# Patient Record
Sex: FEMALE | Race: WHITE | NOT HISPANIC OR LATINO | Employment: OTHER | URBAN - METROPOLITAN AREA
[De-identification: names, ages, dates, MRNs, and addresses within clinical notes are randomized per-mention and may not be internally consistent; named-entity substitution may affect disease eponyms.]

---

## 2019-07-18 ENCOUNTER — HISTORICAL (OUTPATIENT)
Dept: CARDIOLOGY | Facility: HOSPITAL | Age: 84
End: 2019-07-18

## 2019-08-19 ENCOUNTER — HISTORICAL (OUTPATIENT)
Dept: RADIOLOGY | Facility: HOSPITAL | Age: 84
End: 2019-08-19

## 2019-12-04 ENCOUNTER — HISTORICAL (OUTPATIENT)
Dept: RADIOLOGY | Facility: HOSPITAL | Age: 84
End: 2019-12-04

## 2020-02-11 ENCOUNTER — HISTORICAL (OUTPATIENT)
Dept: CARDIOLOGY | Facility: HOSPITAL | Age: 85
End: 2020-02-11

## 2020-08-20 ENCOUNTER — HISTORICAL (OUTPATIENT)
Dept: RADIOLOGY | Facility: HOSPITAL | Age: 85
End: 2020-08-20

## 2022-01-01 ENCOUNTER — OFFICE VISIT (OUTPATIENT)
Dept: INTERNAL MEDICINE | Facility: CLINIC | Age: 87
End: 2022-01-01
Payer: MEDICARE

## 2022-01-01 VITALS — WEIGHT: 167.81 LBS | BODY MASS INDEX: 26.34 KG/M2 | HEIGHT: 67 IN

## 2022-01-01 DIAGNOSIS — Z87.81 S/P LEFT HIP FRACTURE: ICD-10-CM

## 2022-01-01 DIAGNOSIS — M79.89 LOCALIZED SWELLING OF BOTH LOWER EXTREMITIES: Primary | ICD-10-CM

## 2022-01-01 DIAGNOSIS — R53.1 LACK OF STRENGTH: ICD-10-CM

## 2022-01-01 PROCEDURE — 99213 PR OFFICE/OUTPT VISIT, EST, LEVL III, 20-29 MIN: ICD-10-PCS | Mod: ,,,

## 2022-01-01 PROCEDURE — 99213 OFFICE O/P EST LOW 20 MIN: CPT | Mod: ,,,

## 2022-01-01 RX ORDER — METHOCARBAMOL 750 MG/1
500 TABLET, FILM COATED ORAL DAILY
COMMUNITY
End: 2023-01-01

## 2022-01-01 RX ORDER — FUROSEMIDE 20 MG/1
10 TABLET ORAL DAILY
Qty: 15 TABLET | Refills: 11 | Status: SHIPPED | OUTPATIENT
Start: 2022-01-01 | End: 2023-01-01

## 2022-01-07 ENCOUNTER — HISTORICAL (OUTPATIENT)
Dept: RADIOLOGY | Facility: HOSPITAL | Age: 87
End: 2022-01-07

## 2022-01-07 LAB — BMD RECOMMENDATION EXT: NORMAL

## 2022-06-16 RX ORDER — APIXABAN 5 MG/1
5 TABLET, FILM COATED ORAL 2 TIMES DAILY
Qty: 60 TABLET | Refills: 1 | Status: SHIPPED | OUTPATIENT
Start: 2022-06-16 | End: 2022-08-18

## 2022-06-16 RX ORDER — APIXABAN 5 MG/1
5 TABLET, FILM COATED ORAL 2 TIMES DAILY
COMMUNITY
Start: 2022-05-12 | End: 2022-06-16 | Stop reason: SDUPTHER

## 2022-06-20 RX ORDER — ALENDRONATE SODIUM 70 MG/1
TABLET ORAL
Qty: 4 TABLET | Refills: 11 | Status: SHIPPED | OUTPATIENT
Start: 2022-06-20 | End: 2022-11-21

## 2022-07-10 PROBLEM — M81.0 OSTEOPOROSIS: Status: ACTIVE | Noted: 2022-07-10

## 2022-07-22 ENCOUNTER — PATIENT OUTREACH (OUTPATIENT)
Dept: ADMINISTRATIVE | Facility: HOSPITAL | Age: 87
End: 2022-07-22
Payer: MEDICARE

## 2022-08-08 RX ORDER — CALCIUM CARBONATE 500(1250)
1 TABLET ORAL 2 TIMES DAILY
COMMUNITY

## 2022-08-09 ENCOUNTER — OFFICE VISIT (OUTPATIENT)
Dept: INTERNAL MEDICINE | Facility: CLINIC | Age: 87
End: 2022-08-09
Payer: MEDICARE

## 2022-08-09 VITALS
BODY MASS INDEX: 26.07 KG/M2 | RESPIRATION RATE: 20 BRPM | WEIGHT: 162.19 LBS | DIASTOLIC BLOOD PRESSURE: 70 MMHG | SYSTOLIC BLOOD PRESSURE: 130 MMHG | TEMPERATURE: 98 F | HEIGHT: 66 IN | HEART RATE: 78 BPM

## 2022-08-09 DIAGNOSIS — I82.5Z2 CHRONIC DEEP VEIN THROMBOSIS (DVT) OF DISTAL VEIN OF LEFT LOWER EXTREMITY: ICD-10-CM

## 2022-08-09 DIAGNOSIS — M81.0 OSTEOPOROSIS, UNSPECIFIED OSTEOPOROSIS TYPE, UNSPECIFIED PATHOLOGICAL FRACTURE PRESENCE: ICD-10-CM

## 2022-08-09 DIAGNOSIS — I49.9 CARDIAC ARRHYTHMIA, UNSPECIFIED CARDIAC ARRHYTHMIA TYPE: ICD-10-CM

## 2022-08-09 DIAGNOSIS — I27.82 OTHER CHRONIC PULMONARY EMBOLISM WITHOUT ACUTE COR PULMONALE: ICD-10-CM

## 2022-08-09 DIAGNOSIS — R79.89 LOW VITAMIN D LEVEL: ICD-10-CM

## 2022-08-09 DIAGNOSIS — Z12.39 ENCOUNTER FOR SCREENING FOR MALIGNANT NEOPLASM OF BREAST, UNSPECIFIED SCREENING MODALITY: ICD-10-CM

## 2022-08-09 DIAGNOSIS — Z00.00 WELLNESS EXAMINATION: Primary | ICD-10-CM

## 2022-08-09 PROCEDURE — 99397 PR PREVENTIVE VISIT,EST,65 & OVER: ICD-10-PCS | Mod: GZ,,, | Performed by: INTERNAL MEDICINE

## 2022-08-09 PROCEDURE — 99397 PER PM REEVAL EST PAT 65+ YR: CPT | Mod: GZ,,, | Performed by: INTERNAL MEDICINE

## 2022-08-09 NOTE — PROGRESS NOTES
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      Review of Systems   Review of Systems   Constitutional: Negative.    HENT: Negative.    Eyes: Negative.    Respiratory: Negative.    Cardiovascular: Negative.    Gastrointestinal: Negative.    Endocrine: Negative.    Genitourinary: Negative.    Musculoskeletal: Negative.    Integumentary:  Negative.   Neurological: Negative.    Psychiatric/Behavioral: Negative.         Medical / Social / Family History     Past Medical History:   Diagnosis Date    Cardiac arrhythmia     Low vitamin D level     Osteoporosis     Other pulmonary embolism without acute cor pulmonale        Past Surgical History:   Procedure Laterality Date    BREAST MASS EXCISION      x3  benign    LAPAROSCOPIC REPAIR OF INGUINAL HERNIA Bilateral        Social History  Ms. Joshua  reports that she has never smoked. She has never used smokeless tobacco. She reports that she does not drink alcohol and does not use drugs.    Family History  Ms.'s Joshua  family history includes Clotting disorder in her mother; No Known Problems in her father and sister.    Medications and Allergies     Medications  Outpatient Medications Marked as Taking for the 22 encounter (Office Visit) with King Duvall MD   Medication Sig Dispense Refill     alendronate (FOSAMAX) 70 MG tablet TAKE ONE TABLET BY MOUTH WEEKLY 30 MINUTES BEFORE FIRST FOOD AND DO NOT LIE DOWN FOR AT LEAST 30 MINUTES 4 tablet 11    calcium carbonate (OS-LEE) 500 mg calcium (1,250 mg) tablet Take 1 tablet by mouth 2 (two) times a day.      ELIQUIS 5 mg Tab Take 1 tablet (5 mg total) by mouth 2 (two) times daily. 60 tablet 1       Allergies  Review of patient's allergies indicates:   Allergen Reactions    Ak-mycin     Beta-blockers (beta-adrenergic blocking agts)     Zyrtec [cetirizine]        Physical Examination     Vitals:    08/09/22 1013   BP: 130/70   Pulse: 78   Resp: 20   Temp: 97.6 °F (36.4 °C)     Physical Exam  Constitutional:       Appearance: Normal appearance.   HENT:      Head: Normocephalic and atraumatic.      Right Ear: Tympanic membrane normal.      Left Ear: Tympanic membrane normal.      Nose: Nose normal.      Mouth/Throat:      Mouth: Mucous membranes are moist.   Eyes:      Extraocular Movements: Extraocular movements intact.      Pupils: Pupils are equal, round, and reactive to light.   Cardiovascular:      Rate and Rhythm: Normal rate and regular rhythm.      Pulses: Normal pulses.   Pulmonary:      Effort: Pulmonary effort is normal.      Breath sounds: Normal breath sounds.   Abdominal:      General: Abdomen is flat. Bowel sounds are normal.      Palpations: Abdomen is soft.   Musculoskeletal:         General: Normal range of motion.      Cervical back: Normal range of motion and neck supple.   Skin:     General: Skin is warm and dry.   Neurological:      General: No focal deficit present.      Mental Status: She is alert and oriented to person, place, and time.   Psychiatric:         Mood and Affect: Mood normal.         Behavior: Behavior normal.          Assessment and Plan (including Health Maintenance)      Problem List  Smart Sets  Document Outside HM   :    Plan:   Wellness examination    Cardiac arrhythmia, unspecified cardiac arrhythmia type    Other  chronic pulmonary embolism without acute cor pulmonale    Chronic deep vein thrombosis (DVT) of distal vein of left lower extremity    Low vitamin D level    Osteoporosis, unspecified osteoporosis type, unspecified pathological fracture presence    Encounter for screening for malignant neoplasm of breast, unspecified screening modality     Discussion of lab results all stable  Mammogram ordered  With the progression of her osteoporosis on Fosamax will recommend beginning Prolia and stopping Fosamax  She wants to return in 1 year for a wellness which is scheduled with the bone density.        There are no preventive care reminders to display for this patient.    Problem List Items Addressed This Visit        Cardiac/Vascular    Cardiac arrhythmia       Hematology    Other pulmonary embolism without acute cor pulmonale    Chronic deep vein thrombosis (DVT) of distal vein of lower extremity       Orthopedic    Osteoporosis       Other    Low vitamin D level      Other Visit Diagnoses     Wellness examination    -  Primary    Encounter for screening for malignant neoplasm of breast, unspecified screening modality              Health Maintenance Topics with due status: Not Due       Topic Last Completion Date    Lipid Panel 08/25/2020    DEXA Scan 01/07/2022    Influenza Vaccine Not Due       No future appointments.         Signature:  King Meza MD  [facility]  [address] [place] [address]  ROSENDO KOWALSKI [id number]    Date of encounter: 8/9/22

## 2022-08-18 RX ORDER — APIXABAN 5 MG/1
TABLET, FILM COATED ORAL
Qty: 60 TABLET | Refills: 1 | Status: SHIPPED | OUTPATIENT
Start: 2022-08-18 | End: 2022-09-14

## 2022-08-18 NOTE — TELEPHONE ENCOUNTER
----- Message from Loren Ramon LPN sent at 8/18/2022  9:42 AM CDT -----  Regarding: FW: need refill today    ----- Message -----  From: Lexi Joshua  Sent: 8/18/2022   9:19 AM CDT  To: Eloina CHRIS Staff  Subject: need refill today                                Patient took last pill of eloquis today and needs refilled at [facility] pharmacy in [place]. Her contact number is [phone]

## 2022-08-24 ENCOUNTER — HOSPITAL ENCOUNTER (OUTPATIENT)
Dept: RADIOLOGY | Facility: HOSPITAL | Age: 87
Discharge: HOME | End: 2022-08-24
Attending: INTERNAL MEDICINE
Payer: MEDICARE

## 2022-08-24 DIAGNOSIS — Z12.39 ENCOUNTER FOR SCREENING FOR MALIGNANT NEOPLASM OF BREAST, UNSPECIFIED SCREENING MODALITY: ICD-10-CM

## 2022-08-24 PROCEDURE — 77067 SCR MAMMO BI INCL CAD: CPT | Mod: 26,,, | Performed by: STUDENT IN AN ORGANIZED HEALTH CARE EDUCATION/TRAINING PROGRAM

## 2022-08-24 PROCEDURE — 77063 BREAST TOMOSYNTHESIS BI: CPT | Mod: 26,,, | Performed by: STUDENT IN AN ORGANIZED HEALTH CARE EDUCATION/TRAINING PROGRAM

## 2022-08-24 PROCEDURE — 77067 MAMMO DIGITAL SCREENING BILAT WITH TOMO: ICD-10-PCS | Mod: 26,,, | Performed by: STUDENT IN AN ORGANIZED HEALTH CARE EDUCATION/TRAINING PROGRAM

## 2022-08-24 PROCEDURE — 77063 MAMMO DIGITAL SCREENING BILAT WITH TOMO: ICD-10-PCS | Mod: 26,,, | Performed by: STUDENT IN AN ORGANIZED HEALTH CARE EDUCATION/TRAINING PROGRAM

## 2022-08-24 PROCEDURE — 77067 SCR MAMMO BI INCL CAD: CPT | Mod: TC

## 2022-08-25 ENCOUNTER — INFUSION (OUTPATIENT)
Dept: INFUSION THERAPY | Facility: HOSPITAL | Age: 87
End: 2022-08-25
Attending: INTERNAL MEDICINE
Payer: MEDICARE

## 2022-08-25 VITALS
HEART RATE: 72 BPM | RESPIRATION RATE: 18 BRPM | TEMPERATURE: 99 F | OXYGEN SATURATION: 96 % | DIASTOLIC BLOOD PRESSURE: 79 MMHG | SYSTOLIC BLOOD PRESSURE: 151 MMHG

## 2022-08-25 DIAGNOSIS — M81.0 OSTEOPOROSIS, UNSPECIFIED OSTEOPOROSIS TYPE, UNSPECIFIED PATHOLOGICAL FRACTURE PRESENCE: Primary | ICD-10-CM

## 2022-08-25 PROCEDURE — 63600175 PHARM REV CODE 636 W HCPCS: Mod: JG | Performed by: INTERNAL MEDICINE

## 2022-08-25 PROCEDURE — 96372 THER/PROPH/DIAG INJ SC/IM: CPT

## 2022-08-25 RX ADMIN — DENOSUMAB 60 MG: 60 INJECTION SUBCUTANEOUS at 03:08

## 2022-08-25 NOTE — PLAN OF CARE
Pt tolerated injection well. Pt remained in clinic for 10 min shot time; NAD or pt complaints noted at the end of that time period. Med information included in AVS. Next appt scheduled and reviewed with pt. Pt denied questions or further needs at the time of discharge.

## 2022-10-31 ENCOUNTER — HOSPITAL ENCOUNTER (INPATIENT)
Facility: HOSPITAL | Age: 87
LOS: 3 days | Discharge: NURSING, REHAB OR LONG-TERM CARE | DRG: 481 | End: 2022-11-03
Attending: STUDENT IN AN ORGANIZED HEALTH CARE EDUCATION/TRAINING PROGRAM | Admitting: INTERNAL MEDICINE
Payer: MEDICARE

## 2022-10-31 DIAGNOSIS — S72.142A CLOSED DISPLACED INTERTROCHANTERIC FRACTURE OF LEFT FEMUR, INITIAL ENCOUNTER: ICD-10-CM

## 2022-10-31 DIAGNOSIS — S72.142A CLOSED COMMINUTED INTERTROCHANTERIC FRACTURE OF LEFT FEMUR, INITIAL ENCOUNTER: ICD-10-CM
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
## 2022-10-31 LAB
ALBUMIN SERPL-MCNC: 3.7 GM/DL (ref 3.4–4.8)
ALBUMIN/GLOB SERPL: 1.9 RATIO (ref 1.1–2)
ALP SERPL-CCNC: 82 UNIT/L (ref 40–150)
ALT SERPL-CCNC: 23 UNIT/L (ref 0–55)
APTT PPP: 27.1 SECONDS (ref 23.2–33.7)
AST SERPL-CCNC: 21 UNIT/L (ref 5–34)
BASOPHILS # BLD AUTO: 0.06 X10(3)/MCL (ref 0–0.2)
BASOPHILS NFR BLD AUTO: 0.4 %
BILIRUBIN DIRECT+TOT PNL SERPL-MCNC: 1.1 MG/DL
BUN SERPL-MCNC: 22.4 MG/DL (ref 9.8–20.1)
CALCIUM SERPL-MCNC: 9 MG/DL (ref 8.4–10.2)
CHLORIDE SERPL-SCNC: 109 MMOL/L (ref 98–107)
CO2 SERPL-SCNC: 24 MMOL/L (ref 23–31)
CREAT SERPL-MCNC: 0.7 MG/DL (ref 0.55–1.02)
EOSINOPHIL # BLD AUTO: 0.02 X10(3)/MCL (ref 0–0.9)
EOSINOPHIL NFR BLD AUTO: 0.1 %
ERYTHROCYTE [DISTWIDTH] IN BLOOD BY AUTOMATED COUNT: 12.9 % (ref 11.5–17)
GFR SERPLBLD CREATININE-BSD FMLA CKD-EPI: >60 MLS/MIN/1.73/M2
GLOBULIN SER-MCNC: 2 GM/DL (ref 2.4–3.5)
GLUCOSE SERPL-MCNC: 148 MG/DL (ref 82–115)
GROUP & RH: NORMAL
HCT VFR BLD AUTO: 37.6 % (ref 37–47)
HGB BLD-MCNC: 12.2 GM/DL (ref 12–16)
IMM GRANULOCYTES # BLD AUTO: 0.11 X10(3)/MCL (ref 0–0.04)
IMM GRANULOCYTES NFR BLD AUTO: 0.7 %
INDIRECT COOMBS GEL: NORMAL
INR BLD: 1.04 (ref 0–1.3)
LYMPHOCYTES # BLD AUTO: 1.07 X10(3)/MCL (ref 0.6–4.6)
LYMPHOCYTES NFR BLD AUTO: 7.2 %
MCH RBC QN AUTO: 31 PG (ref 27–31)
MCHC RBC AUTO-ENTMCNC: 32.4 MG/DL (ref 33–36)
MCV RBC AUTO: 95.7 FL (ref 80–94)
MONOCYTES # BLD AUTO: 1.01 X10(3)/MCL (ref 0.1–1.3)
MONOCYTES NFR BLD AUTO: 6.8 %
NEUTROPHILS # BLD AUTO: 12.5 X10(3)/MCL (ref 2.1–9.2)
NEUTROPHILS NFR BLD AUTO: 84.8 %
NRBC BLD AUTO-RTO: 0 %
PLATELET # BLD AUTO: 212 X10(3)/MCL (ref 130–400)
PMV BLD AUTO: 11.4 FL (ref 7.4–10.4)
POTASSIUM SERPL-SCNC: 4 MMOL/L (ref 3.5–5.1)
PROT SERPL-MCNC: 5.7 GM/DL (ref 5.8–7.6)
PROTHROMBIN TIME: 13.5 SECONDS (ref 12.5–14.5)
RBC # BLD AUTO: 3.93 X10(6)/MCL (ref 4.2–5.4)
SODIUM SERPL-SCNC: 140 MMOL/L (ref 136–145)
WBC # SPEC AUTO: 14.8 X10(3)/MCL (ref 4.5–11.5)

## 2022-10-31 PROCEDURE — 99285 EMERGENCY DEPT VISIT HI MDM: CPT | Mod: 25

## 2022-10-31 PROCEDURE — 63600175 PHARM REV CODE 636 W HCPCS: Performed by: INTERNAL MEDICINE

## 2022-10-31 PROCEDURE — 85730 THROMBOPLASTIN TIME PARTIAL: CPT | Performed by: STUDENT IN AN ORGANIZED HEALTH CARE EDUCATION/TRAINING PROGRAM

## 2022-10-31 PROCEDURE — 11000001 HC ACUTE MED/SURG PRIVATE ROOM

## 2022-10-31 PROCEDURE — 63600175 PHARM REV CODE 636 W HCPCS: Performed by: STUDENT IN AN ORGANIZED HEALTH CARE EDUCATION/TRAINING PROGRAM

## 2022-10-31 PROCEDURE — 85025 COMPLETE CBC W/AUTO DIFF WBC: CPT | Performed by: STUDENT IN AN ORGANIZED HEALTH CARE EDUCATION/TRAINING PROGRAM

## 2022-10-31 PROCEDURE — 86923 COMPATIBILITY TEST ELECTRIC: CPT | Performed by: PHYSICIAN ASSISTANT

## 2022-10-31 PROCEDURE — 25000003 PHARM REV CODE 250: Performed by: INTERNAL MEDICINE

## 2022-10-31 PROCEDURE — 86901 BLOOD TYPING SEROLOGIC RH(D): CPT | Performed by: STUDENT IN AN ORGANIZED HEALTH CARE EDUCATION/TRAINING PROGRAM

## 2022-10-31 PROCEDURE — 36415 COLL VENOUS BLD VENIPUNCTURE: CPT | Performed by: STUDENT IN AN ORGANIZED HEALTH CARE EDUCATION/TRAINING PROGRAM

## 2022-10-31 PROCEDURE — 99499 NO LOS: ICD-10-PCS | Mod: ,,, | Performed by: ORTHOPAEDIC SURGERY

## 2022-10-31 PROCEDURE — 80053 COMPREHEN METABOLIC PANEL: CPT | Performed by: STUDENT IN AN ORGANIZED HEALTH CARE EDUCATION/TRAINING PROGRAM

## 2022-10-31 PROCEDURE — 99499 UNLISTED E&M SERVICE: CPT | Mod: ,,, | Performed by: ORTHOPAEDIC SURGERY

## 2022-10-31 PROCEDURE — 96374 THER/PROPH/DIAG INJ IV PUSH: CPT

## 2022-10-31 PROCEDURE — 85610 PROTHROMBIN TIME: CPT | Performed by: STUDENT IN AN ORGANIZED HEALTH CARE EDUCATION/TRAINING PROGRAM

## 2022-10-31 RX ORDER — ACETAMINOPHEN 325 MG/1
650 TABLET ORAL EVERY 4 HOURS PRN
Status: DISCONTINUED | OUTPATIENT
Start: 2022-10-31 | End: 2022-11-03 | Stop reason: HOSPADM

## 2022-10-31 RX ORDER — AMOXICILLIN 250 MG
2 CAPSULE ORAL 2 TIMES DAILY
Status: DISCONTINUED | OUTPATIENT
Start: 2022-10-31 | End: 2022-11-03 | Stop reason: HOSPADM

## 2022-10-31 RX ORDER — MORPHINE SULFATE 4 MG/ML
4 INJECTION, SOLUTION INTRAMUSCULAR; INTRAVENOUS EVERY 6 HOURS PRN
Status: DISCONTINUED | OUTPATIENT
Start: 2022-10-31 | End: 2022-10-31

## 2022-10-31 RX ORDER — ACETAMINOPHEN 325 MG/1
650 TABLET ORAL EVERY 8 HOURS PRN
Status: DISCONTINUED | OUTPATIENT
Start: 2022-10-31 | End: 2022-11-03 | Stop reason: HOSPADM

## 2022-10-31 RX ORDER — AMOXICILLIN 250 MG
2 CAPSULE ORAL 2 TIMES DAILY
Status: DISCONTINUED | OUTPATIENT
Start: 2022-10-31 | End: 2022-10-31

## 2022-10-31 RX ORDER — TALC
3 POWDER (GRAM) TOPICAL NIGHTLY
Status: DISCONTINUED | OUTPATIENT
Start: 2022-10-31 | End: 2022-11-03 | Stop reason: HOSPADM

## 2022-10-31 RX ORDER — MORPHINE SULFATE 4 MG/ML
2 INJECTION, SOLUTION INTRAMUSCULAR; INTRAVENOUS EVERY 6 HOURS PRN
Status: DISCONTINUED | OUTPATIENT
Start: 2022-10-31 | End: 2022-11-03 | Stop reason: HOSPADM

## 2022-10-31 RX ORDER — ENOXAPARIN SODIUM 100 MG/ML
40 INJECTION SUBCUTANEOUS EVERY 24 HOURS
Status: DISCONTINUED | OUTPATIENT
Start: 2022-10-31 | End: 2022-11-01 | Stop reason: DRUGHIGH

## 2022-10-31 RX ORDER — FENTANYL CITRATE 50 UG/ML
50 INJECTION, SOLUTION INTRAMUSCULAR; INTRAVENOUS
Status: COMPLETED | OUTPATIENT
Start: 2022-10-31 | End: 2022-10-31

## 2022-10-31 RX ORDER — ONDANSETRON 2 MG/ML
4 INJECTION INTRAMUSCULAR; INTRAVENOUS EVERY 8 HOURS PRN
Status: DISCONTINUED | OUTPATIENT
Start: 2022-10-31 | End: 2022-11-03 | Stop reason: HOSPADM

## 2022-10-31 RX ORDER — GLUCAGON 1 MG
1 KIT INJECTION
Status: DISCONTINUED | OUTPATIENT
Start: 2022-10-31 | End: 2022-11-03 | Stop reason: HOSPADM

## 2022-10-31 RX ORDER — IBUPROFEN 200 MG
16 TABLET ORAL
Status: DISCONTINUED | OUTPATIENT
Start: 2022-10-31 | End: 2022-11-03 | Stop reason: HOSPADM

## 2022-10-31 RX ORDER — IBUPROFEN 200 MG
24 TABLET ORAL
Status: DISCONTINUED | OUTPATIENT
Start: 2022-10-31 | End: 2022-11-03 | Stop reason: HOSPADM

## 2022-10-31 RX ADMIN — ENOXAPARIN SODIUM 40 MG: 40 INJECTION SUBCUTANEOUS at 04:10

## 2022-10-31 RX ADMIN — FENTANYL CITRATE 50 MCG: 50 INJECTION, SOLUTION INTRAMUSCULAR; INTRAVENOUS at 08:10

## 2022-10-31 RX ADMIN — MELATONIN TAB 3 MG 3 MG: 3 TAB at 08:10

## 2022-10-31 RX ADMIN — SENNOSIDES AND DOCUSATE SODIUM 2 TABLET: 50; 8.6 TABLET ORAL at 08:10

## 2022-10-31 NOTE — ED PROVIDER NOTES
Encounter Date: 10/31/2022    SCRIBE #1 NOTE: IBrian, sarah scribing for, and in the presence of,  Seth Valdez IV, MD. I have scribed the following portions of the note - Other sections scribed: HPI, ROS, PE.     History     Chief Complaint   Patient presents with    Fall     Pt fell ambulating to restroom, denies trip and fall or weakness. Pt denies hitting head, on Eliquis. Left leg shortened and rotated. Palpable pulse. GCS 15     87 year old female with past medical history of osteoporosis presents to ED following a fall 1-2 hours PTA. Pt states that she was walking to the bathroom and fell hurting her left hip. Pt denies LOC and injury to head. Pt denies history of hip surgery.    PCP: Seth Valdez IV, MD    The history is provided by the patient. No  was used.   Fall  The accident occurred 1 to 2 hours ago. The fall occurred while walking. Distance fallen: ground. She landed on A hard floor. The point of impact was the left hip. The pain is present in the left hip. She was Not ambulatory at the scene. Pertinent negatives include no fever, no abdominal pain, no nausea, no vomiting, no hematuria and no loss of consciousness. She has tried nothing for the symptoms.   Review of patient's allergies indicates:   Allergen Reactions    Ak-mycin     Beta-blockers (beta-adrenergic blocking agts)     Zyrtec [cetirizine]      Past Medical History:   Diagnosis Date    Cardiac arrhythmia     Low vitamin D level     Osteoporosis     Other pulmonary embolism without acute cor pulmonale      Past Surgical History:   Procedure Laterality Date    BREAST MASS EXCISION      x3  benign    LAPAROSCOPIC REPAIR OF INGUINAL HERNIA Bilateral      Family History   Problem Relation Age of Onset    Clotting disorder Mother     No Known Problems Father     No Known Problems Sister      Social History     Tobacco Use    Smoking status: Never    Smokeless tobacco: Never   Substance Use Topics    Alcohol  use: Never    Drug use: Never     Review of Systems   Constitutional:  Negative for chills and fever.   HENT:  Negative for congestion, rhinorrhea and sore throat.    Eyes:  Negative for visual disturbance.   Respiratory:  Negative for cough and shortness of breath.    Cardiovascular:  Negative for chest pain and leg swelling.   Gastrointestinal:  Negative for abdominal pain, nausea and vomiting.   Genitourinary:  Negative for dysuria, hematuria, vaginal bleeding and vaginal discharge.   Musculoskeletal:  Positive for joint swelling.        Left hip pain   Skin:  Negative for rash.   Neurological:  Negative for loss of consciousness and weakness.   Psychiatric/Behavioral:  Negative for confusion.      Physical Exam     Initial Vitals [10/31/22 0824]   BP Pulse Resp Temp SpO2   124/68 70 16 98.2 °F (36.8 °C) 97 %      MAP       --         Physical Exam    Nursing note and vitals reviewed.  Constitutional: She is not diaphoretic. No distress.   HENT:   Head: Normocephalic and atraumatic.   Eyes: EOM are normal. Pupils are equal, round, and reactive to light.   Neck: Neck supple.   Normal range of motion.  Cardiovascular:  Normal rate and regular rhythm.           No murmur heard.  Pulses:       Dorsalis pedis pulses are 2+ on the left side.        Posterior tibial pulses are 2+ on the left side.   Brisk capillary refill, good pulses distal to injury   Pulmonary/Chest: Breath sounds normal. No respiratory distress. She has no wheezes. She has no rales.   Abdominal: Abdomen is soft. She exhibits no distension. There is no abdominal tenderness.   Musculoskeletal:         General: Normal range of motion.      Cervical back: Normal range of motion and neck supple.      Left hip: Tenderness present.      Comments: Left hip externally rotated     Neurological: She is alert and oriented to person, place, and time. She has normal strength.   Skin: Skin is warm. No rash noted.   Psychiatric: She has a normal mood and affect.        ED Course   Procedures  Labs Reviewed   COMPREHENSIVE METABOLIC PANEL - Abnormal; Notable for the following components:       Result Value    Chloride 109 (*)     Glucose Level 148 (*)     Blood Urea Nitrogen 22.4 (*)     Protein Total 5.7 (*)     Globulin 2.0 (*)     All other components within normal limits   CBC WITH DIFFERENTIAL - Abnormal; Notable for the following components:    WBC 14.8 (*)     RBC 3.93 (*)     MCV 95.7 (*)     MCHC 32.4 (*)     MPV 11.4 (*)     Neut # 12.5 (*)     IG# 0.11 (*)     All other components within normal limits   CBC W/ AUTO DIFFERENTIAL    Narrative:     The following orders were created for panel order CBC auto differential.  Procedure                               Abnormality         Status                     ---------                               -----------         ------                     CBC with Differential[[id number]]        Abnormal            Final result                 Please view results for these tests on the individual orders.   PROTIME-INR   APTT   TYPE & SCREEN          Imaging Results              X-Ray Chest 1 View (Final result)  Result time 10/31/22 09:31:56      Final result by Eliezer Granda MD (10/31/22 09:31:56)                   Impression:      No active pulmonary disease      Electronically signed by: Eliezer Granda  Date:    10/31/2022  Time:    09:31               Narrative:    EXAMINATION:  XR CHEST 1 VIEW    CPT 91702    CLINICAL HISTORY:  fall;    FINDINGS:  There is prominence of the superior mediastinum with prominent soft tissue density in the right paratracheal region likely related to tortuous brachycephalic vessels cardiac silhouette is not enlarged lung fields are clear and free of gross infiltrates atelectasis or effusions                                       X-Ray Hip 2 or 3 views Left (with Pelvis when performed) (In process)                   X-Rays:   Independently Interpreted Readings:   Other Readings:  XR L hip -  displaced intertrochanteric fracture   Medications   ondansetron injection 4 mg (has no administration in time range)   acetaminophen tablet 650 mg (has no administration in time range)   acetaminophen tablet 650 mg (has no administration in time range)   glucose chewable tablet 16 g (has no administration in time range)   glucose chewable tablet 24 g (has no administration in time range)   dextrose 50% injection 12.5 g (has no administration in time range)   dextrose 50% injection 25 g (has no administration in time range)   glucagon (human recombinant) injection 1 mg (has no administration in time range)   morphine injection 4 mg (has no administration in time range)   fentaNYL injection 50 mcg (50 mcg Intravenous Given 10/31/22 0851)     Medical Decision Making:   History:   Old Medical Records: I decided to obtain old medical records.  Initial Assessment:   88 yo, fall onto L hip - displaced fx as above. Discussed with ortho, will admit to medicine, plan for surgery tomorrow.   Differential Diagnosis:   Fx, contusion, sprain, strain   Independently Interpreted Test(s):   I have ordered and independently interpreted X-rays - see prior notes.  Clinical Tests:   Lab Tests: Ordered and Reviewed  Radiological Study: Ordered and Reviewed  ED Management:  IV opioids   Other:   I have discussed this case with another health care provider.       <> Summary of the Discussion: Phuc Tavarez Attestation:   Scribe #1: I performed the above scribed service and the documentation accurately describes the services I performed. I attest to the accuracy of the note.    Attending Attestation:           Physician Attestation for Scribe:  Physician Attestation Statement for Scribe #1: I, Seth Valdez IV, MD, reviewed documentation, as scribed by Brian Ling in my presence, and it is both accurate and complete.           ED Course as of 10/31/22 1110   Mon Oct 31, 2022   0948 Dr Friend - medicine admit, surgery  tomorrow   [AC]      ED Course User Index  [AC] Seth Valdez IV, MD                 Clinical Impression:   Final diagnoses:  [W19.XXXA] Fall, initial encounter (Primary)  [S72.142A] Closed displaced intertrochanteric fracture of left femur, initial encounter      ED Disposition Condition    Admit Stable        I, Seth Valdez MD personally performed the history, PE, MDM, and procedures as documented above and agree with the scribe's documentation.           Seth Valdez IV, MD  10/31/22 4314

## 2022-10-31 NOTE — H&P
[facility]  Hospital Medicine History & Physical Examination       Patient Name: Niecy Joshua  MRN: [id number]  Patient Class: IP- Inpatient   Admission Date: 10/31/2022   Admitting Physician: Emile Stokes MD  Length of Stay: 0  Attending Physician: Emile Stokes MD  Primary Care Provider: King Meza MD  Face-to-Face encounter date: 10/31/2022  Code Status: Full code   Chief Complaint: Fall (Pt fell ambulating to restroom, denies trip and fall or weakness. Pt denies hitting head, on Eliquis. Left leg shortened and rotated. Palpable pulse. GCS 15)        Patient information was obtained from patient, patient's family, past medical records and ER records.     HISTORY OF PRESENT ILLNESS:   Niecy Joshua is a 87 y.o. female with a past medical history of DVT, osteoporosis, and vitamin-D deficiency presented to [facility] on 10/31/2022 for mechanical fall.  Patient reports she was walking to the bathroom when she fell onto her left hip.  Patient denies hitting head, LOC, chest pain, shortness of breath, abdominal pain, nausea, vomiting, dizziness, syncope, and fever.  Patient reports improvement of left hip pain after pain medications given in ED.  Initial vital signs in the ED were /68, pulse 70, respirations 16, temperature 36.8° C, and SpO2 97%.  Labs revealed WBC 14.8, chloride 109, BUN 22.4, and glucose 148.  Chest x-ray revealed no acute pulmonary process.  Left hip x-ray revealed comminuted left intertrochanteric femur fracture.  Orthopedics was consulted with plans for surgery tomorrow. Patient was admitted to hospital medicine service for further medical management.   PAST MEDICAL HISTORY:   DVT  Osteoporosis   Vitamin-D deficiency    PAST SURGICAL HISTORY:   Breast mass excision x3  Colonoscopy   Inguinal hernia repair    ALLERGIES:   Ak-mycin, Beta-blockers (beta-adrenergic blocking agts), and Zyrtec [cetirizine]    FAMILY HISTORY:   DVT: Mother    SOCIAL HISTORY:      Social History     Tobacco Use    Smoking status: Never    Smokeless tobacco: Never   Substance Use Topics    Alcohol use: Never        HOME MEDICATIONS:     Prior to Admission medications    Medication Sig Start Date End Date Taking? Authorizing Provider   alendronate (FOSAMAX) 70 MG tablet TAKE ONE TABLET BY MOUTH WEEKLY 30 MINUTES BEFORE FIRST FOOD AND DO NOT LIE DOWN FOR AT LEAST 30 MINUTES 6/20/22   King Duvall MD   calcium carbonate (OS-LEE) 500 mg calcium (1,250 mg) tablet Take 1 tablet by mouth 2 (two) times a day.    Historical Provider   ELIQUIS 5 mg Tab TAKE ONE TABLET BY MOUTH TWICE DAILY 9/14/22   LLUVIA Gomes       REVIEW OF SYSTEMS:   Except as documented, all other systems reviewed and negative     PHYSICAL EXAM:     VITAL SIGNS: 24 HRS MIN & MAX LAST   Temp  Min: 98.2 °F (36.8 °C)  Max: 98.2 °F (36.8 °C) 98.2 °F (36.8 °C)   BP  Min: 124/68  Max: 132/97 (!) 132/97     Pulse  Min: 70  Max: 72  72   Resp  Min: 16  Max: 21 (!) 21     SpO2  Min: 97 %  Max: 99 % 99 %       General appearance: Female in no apparent distress.  HEENNT: Atraumatic head. Moist mucous membranes of oral cavity.   Lungs: Clear to auscultation bilaterally. No wheezing present.   Heart: Regular rate and rhythm.    Abdomen: Soft, non-distended, non-tender. Bowel sounds are normal.   Extremities:  Left hip externally rotated with tenderness to palpation; 2+ DP pulse  Skin: No Rash. Warm and dry.   Neuro: Awake, alert, and oriented. Motor and sensory exams grossly intact.   Psych/mental status: Appropriate mood and affect. Responds appropriately to questions.     LABS AND IMAGING:     Recent Labs   Lab 10/31/22  0841   WBC 14.8*   RBC 3.93*   HGB 12.2   HCT 37.6   MCV 95.7*   MCH 31.0   MCHC 32.4*   RDW 12.9      MPV 11.4*       Recent Labs   Lab 10/31/22  0841      K 4.0   CO2 24   BUN 22.4*   CREATININE 0.70   CALCIUM 9.0   ALBUMIN 3.7   ALKPHOS 82   ALT 23   AST 21   BILITOT 1.1       Microbiology  Results (last 7 days)       ** No results found for the last 168 hours. **             X-Ray Chest 1 View  Narrative: EXAMINATION:  XR CHEST 1 VIEW    CPT 75968    CLINICAL HISTORY:  fall;    FINDINGS:  There is prominence of the superior mediastinum with prominent soft tissue density in the right paratracheal region likely related to tortuous brachycephalic vessels cardiac silhouette is not enlarged lung fields are clear and free of gross infiltrates atelectasis or effusions  Impression: No active pulmonary disease    Electronically signed by: Eliezer Granda  Date:    10/31/2022  Time:    09:31        ASSESSMENT & PLAN:   Assessment:  Left intertrochanteric femur fracture  Leukocytosis, likely reactive  History of DVT, vitamin-D deficiency, and osteoporosis    Plan:  NPO after midnight  PRN pain medications   Plans for surgery tomorrow  Orthopedics consulted, appreciate recommendations  Continue appropriate home medications   Labs in a.m.      VTE Prophylaxis: will be placed on SCD for DVT prophylaxis and will be advised to be as mobile as possible and sit in a chair as tolerated      __________________________________________________________________________  INPATIENT LIST OF MEDICATIONS     Scheduled Meds:  Continuous Infusions:  PRN Meds:.acetaminophen, acetaminophen, dextrose 50%, dextrose 50%, glucagon (human recombinant), glucose, glucose, ondansetron      IPramod PA-C, have reviewed and discussed the case with Dr. Emile Stokes MD  Please see the following addendum for further assessment and plan from there attending MD.    10/31/2022    ________________________________________________________________________________    MD Addendum:  I, Dr. Emile Stokes MD assumed care of this patient today at ---am/pm  For the patient encounter, I performed the substantive portion of the visit, I reviewed the PA documentation, treatment plan, and medical decision making.  I had face to face time with this patient      A. History:    B. Physical exam:    C. Medical decision making:    Discharge Planning and Disposition: No mobility needs. Ambulating well. Good social support system.   Anticipated discharge    All diagnosis and differential diagnosis have been reviewed; assessment and plan has been documented; I have personally reviewed the labs and test results that are presently available; I have reviewed the patients medication list; I have reviewed the consulting providers response and recommendations. I have reviewed or attempted to review medical records based upon their availability.    All of the patient and family questions have been addressed and answered. Patient's is agreeable to the above stated plan. I will continue to monitor closely and make adjustments to medical management as needed.      Emile Stokes MD  10/31/2022

## 2022-10-31 NOTE — PROGRESS NOTES
Patient is 87-year-old female fall onto her left hip.  Patient has a comminuted left intertrochanteric femur fracture appears to be shortened.  X-rays were located under the media tab.  NPO at midnight medical optimization for surgery tomorrow.    NPO  NWB  OR tomorrow  Full consult to follow    Phuc

## 2022-11-01 ENCOUNTER — ANESTHESIA (OUTPATIENT)
Dept: SURGERY | Facility: HOSPITAL | Age: 87
DRG: 481 | End: 2022-11-01
Payer: MEDICARE

## 2022-11-01 ENCOUNTER — ANESTHESIA EVENT (OUTPATIENT)
Dept: SURGERY | Facility: HOSPITAL | Age: 87
DRG: 481 | End: 2022-11-01
Payer: MEDICARE

## 2022-11-01 PROBLEM — S72.142A CLOSED COMMINUTED INTERTROCHANTERIC FRACTURE OF LEFT FEMUR: Status: ACTIVE | Noted: 2022-11-01

## 2022-11-01 LAB
ALBUMIN SERPL-MCNC: 3.5 GM/DL (ref 3.4–4.8)
ALBUMIN/GLOB SERPL: 1.8 RATIO (ref 1.1–2)
ALP SERPL-CCNC: 72 UNIT/L (ref 40–150)
ALT SERPL-CCNC: 19 UNIT/L (ref 0–55)
AST SERPL-CCNC: 19 UNIT/L (ref 5–34)
BASOPHILS # BLD AUTO: 0.03 X10(3)/MCL (ref 0–0.2)
BASOPHILS NFR BLD AUTO: 0.4 %
BILIRUBIN DIRECT+TOT PNL SERPL-MCNC: 1.6 MG/DL
BUN SERPL-MCNC: 30.7 MG/DL (ref 9.8–20.1)
CALCIUM SERPL-MCNC: 8.9 MG/DL (ref 8.4–10.2)
CHLORIDE SERPL-SCNC: 105 MMOL/L (ref 98–107)
CO2 SERPL-SCNC: 23 MMOL/L (ref 23–31)
CREAT SERPL-MCNC: 0.89 MG/DL (ref 0.55–1.02)
EOSINOPHIL # BLD AUTO: 0.01 X10(3)/MCL (ref 0–0.9)
EOSINOPHIL NFR BLD AUTO: 0.1 %
ERYTHROCYTE [DISTWIDTH] IN BLOOD BY AUTOMATED COUNT: 13.1 % (ref 11.5–17)
GFR SERPLBLD CREATININE-BSD FMLA CKD-EPI: >60 MLS/MIN/1.73/M2
GLOBULIN SER-MCNC: 1.9 GM/DL (ref 2.4–3.5)
GLUCOSE SERPL-MCNC: 135 MG/DL (ref 82–115)
HCT VFR BLD AUTO: 32.3 % (ref 37–47)
HGB BLD-MCNC: 10.5 GM/DL (ref 12–16)
IMM GRANULOCYTES # BLD AUTO: 0.03 X10(3)/MCL (ref 0–0.04)
IMM GRANULOCYTES NFR BLD AUTO: 0.4 %
LYMPHOCYTES # BLD AUTO: 1.93 X10(3)/MCL (ref 0.6–4.6)
LYMPHOCYTES NFR BLD AUTO: 24.1 %
MCH RBC QN AUTO: 31.2 PG (ref 27–31)
MCHC RBC AUTO-ENTMCNC: 32.5 MG/DL (ref 33–36)
MCV RBC AUTO: 95.8 FL (ref 80–94)
MONOCYTES # BLD AUTO: 1.09 X10(3)/MCL (ref 0.1–1.3)
MONOCYTES NFR BLD AUTO: 13.6 %
NEUTROPHILS # BLD AUTO: 4.9 X10(3)/MCL (ref 2.1–9.2)
NEUTROPHILS NFR BLD AUTO: 61.4 %
NRBC BLD AUTO-RTO: 0 %
PLATELET # BLD AUTO: 178 X10(3)/MCL (ref 130–400)
PMV BLD AUTO: 11.3 FL (ref 7.4–10.4)
POTASSIUM SERPL-SCNC: 5 MMOL/L (ref 3.5–5.1)
PROT SERPL-MCNC: 5.4 GM/DL (ref 5.8–7.6)
RBC # BLD AUTO: 3.37 X10(6)/MCL (ref 4.2–5.4)
SODIUM SERPL-SCNC: 135 MMOL/L (ref 136–145)
WBC # SPEC AUTO: 8 X10(3)/MCL (ref 4.5–11.5)

## 2022-11-01 PROCEDURE — 63600175 PHARM REV CODE 636 W HCPCS: Performed by: ORTHOPAEDIC SURGERY

## 2022-11-01 PROCEDURE — 25000003 PHARM REV CODE 250: Performed by: PHYSICIAN ASSISTANT

## 2022-11-01 PROCEDURE — C1769 GUIDE WIRE: HCPCS | Performed by: ORTHOPAEDIC SURGERY

## 2022-11-01 PROCEDURE — 25000003 PHARM REV CODE 250

## 2022-11-01 PROCEDURE — 27245 PR OPEN FIX INTER/SUBTROCH FX,IMPLNT: ICD-10-PCS | Mod: LT,,, | Performed by: ORTHOPAEDIC SURGERY

## 2022-11-01 PROCEDURE — 63600175 PHARM REV CODE 636 W HCPCS

## 2022-11-01 PROCEDURE — 36000711: Performed by: ORTHOPAEDIC SURGERY

## 2022-11-01 PROCEDURE — 37000009 HC ANESTHESIA EA ADD 15 MINS: Performed by: ORTHOPAEDIC SURGERY

## 2022-11-01 PROCEDURE — 27245 TREAT THIGH FRACTURE: CPT | Mod: LT,,, | Performed by: ORTHOPAEDIC SURGERY

## 2022-11-01 PROCEDURE — 36000710: Performed by: ORTHOPAEDIC SURGERY

## 2022-11-01 PROCEDURE — 80053 COMPREHEN METABOLIC PANEL: CPT | Performed by: PHYSICIAN ASSISTANT

## 2022-11-01 PROCEDURE — 97162 PT EVAL MOD COMPLEX 30 MIN: CPT

## 2022-11-01 PROCEDURE — 71000033 HC RECOVERY, INTIAL HOUR: Performed by: ORTHOPAEDIC SURGERY

## 2022-11-01 PROCEDURE — 27800903 OPTIME MED/SURG SUP & DEVICES OTHER IMPLANTS: Performed by: ORTHOPAEDIC SURGERY

## 2022-11-01 PROCEDURE — 37000008 HC ANESTHESIA 1ST 15 MINUTES: Performed by: ORTHOPAEDIC SURGERY

## 2022-11-01 PROCEDURE — 85025 COMPLETE CBC W/AUTO DIFF WBC: CPT | Performed by: PHYSICIAN ASSISTANT

## 2022-11-01 PROCEDURE — 36415 COLL VENOUS BLD VENIPUNCTURE: CPT | Performed by: PHYSICIAN ASSISTANT

## 2022-11-01 PROCEDURE — C1713 ANCHOR/SCREW BN/BN,TIS/BN: HCPCS | Performed by: ORTHOPAEDIC SURGERY

## 2022-11-01 PROCEDURE — 27201423 OPTIME MED/SURG SUP & DEVICES STERILE SUPPLY: Performed by: ORTHOPAEDIC SURGERY

## 2022-11-01 PROCEDURE — 11000001 HC ACUTE MED/SURG PRIVATE ROOM

## 2022-11-01 PROCEDURE — 27245 PR OPEN FIX INTER/SUBTROCH FX,IMPLNT: ICD-10-PCS | Mod: AS,LT,, | Performed by: PHYSICIAN ASSISTANT

## 2022-11-01 PROCEDURE — 63600175 PHARM REV CODE 636 W HCPCS: Performed by: PHYSICIAN ASSISTANT

## 2022-11-01 PROCEDURE — 25000003 PHARM REV CODE 250: Performed by: INTERNAL MEDICINE

## 2022-11-01 PROCEDURE — 99223 PR INITIAL HOSPITAL CARE,LEVL III: ICD-10-PCS | Mod: 57,,, | Performed by: ORTHOPAEDIC SURGERY

## 2022-11-01 PROCEDURE — 63600175 PHARM REV CODE 636 W HCPCS: Performed by: INTERNAL MEDICINE

## 2022-11-01 PROCEDURE — 99223 1ST HOSP IP/OBS HIGH 75: CPT | Mod: 57,,, | Performed by: ORTHOPAEDIC SURGERY

## 2022-11-01 PROCEDURE — 27245 TREAT THIGH FRACTURE: CPT | Mod: AS,LT,, | Performed by: PHYSICIAN ASSISTANT

## 2022-11-01 DEVICE — SCREW LOK XL25 5X44MM: Type: IMPLANTABLE DEVICE | Site: LEG | Status: FUNCTIONAL

## 2022-11-01 DEVICE — CEMENT TRAUMACEM V+ STRL 10ML: Type: IMPLANTABLE DEVICE | Site: LEG | Status: FUNCTIONAL

## 2022-11-01 DEVICE — SCREW XL25 IM NAIL LOK 5X54MM: Type: IMPLANTABLE DEVICE | Site: LEG | Status: FUNCTIONAL

## 2022-11-01 DEVICE — IMPLANTABLE DEVICE: Type: IMPLANTABLE DEVICE | Site: LEG | Status: FUNCTIONAL

## 2022-11-01 DEVICE — BLADE HELICAL PERF GOLD 110MM: Type: IMPLANTABLE DEVICE | Site: LEG | Status: FUNCTIONAL

## 2022-11-01 RX ORDER — CALCIUM CHLORIDE INJECTION 100 MG/ML
INJECTION, SOLUTION INTRAVENOUS
Status: DISCONTINUED | OUTPATIENT
Start: 2022-11-01 | End: 2022-11-01

## 2022-11-01 RX ORDER — MORPHINE SULFATE 4 MG/ML
4 INJECTION, SOLUTION INTRAMUSCULAR; INTRAVENOUS EVERY 6 HOURS PRN
Status: DISCONTINUED | OUTPATIENT
Start: 2022-11-01 | End: 2022-11-03 | Stop reason: HOSPADM

## 2022-11-01 RX ORDER — ETOMIDATE 2 MG/ML
INJECTION INTRAVENOUS
Status: DISCONTINUED | OUTPATIENT
Start: 2022-11-01 | End: 2022-11-01

## 2022-11-01 RX ORDER — DIPHENHYDRAMINE HYDROCHLORIDE 50 MG/ML
25 INJECTION INTRAMUSCULAR; INTRAVENOUS EVERY 6 HOURS PRN
Status: DISCONTINUED | OUTPATIENT
Start: 2022-11-01 | End: 2022-11-02

## 2022-11-01 RX ORDER — PROPOFOL 10 MG/ML
VIAL (ML) INTRAVENOUS
Status: DISCONTINUED | OUTPATIENT
Start: 2022-11-01 | End: 2022-11-01

## 2022-11-01 RX ORDER — ROCURONIUM BROMIDE 10 MG/ML
INJECTION, SOLUTION INTRAVENOUS
Status: DISCONTINUED | OUTPATIENT
Start: 2022-11-01 | End: 2022-11-01

## 2022-11-01 RX ORDER — OXYCODONE HYDROCHLORIDE 5 MG/1
10 TABLET ORAL EVERY 4 HOURS PRN
Status: DISCONTINUED | OUTPATIENT
Start: 2022-11-01 | End: 2022-11-03 | Stop reason: HOSPADM

## 2022-11-01 RX ORDER — FENTANYL CITRATE 50 UG/ML
INJECTION, SOLUTION INTRAMUSCULAR; INTRAVENOUS
Status: DISCONTINUED | OUTPATIENT
Start: 2022-11-01 | End: 2022-11-01

## 2022-11-01 RX ORDER — PHENYLEPHRINE HCL IN 0.9% NACL 1 MG/10 ML
SYRINGE (ML) INTRAVENOUS
Status: DISCONTINUED | OUTPATIENT
Start: 2022-11-01 | End: 2022-11-01

## 2022-11-01 RX ORDER — VANCOMYCIN HYDROCHLORIDE 1 G/20ML
INJECTION, POWDER, LYOPHILIZED, FOR SOLUTION INTRAVENOUS
Status: DISCONTINUED | OUTPATIENT
Start: 2022-11-01 | End: 2022-11-01 | Stop reason: HOSPADM

## 2022-11-01 RX ORDER — ALBUMIN HUMAN 250 G/1000ML
SOLUTION INTRAVENOUS CONTINUOUS PRN
Status: DISCONTINUED | OUTPATIENT
Start: 2022-11-01 | End: 2022-11-01

## 2022-11-01 RX ORDER — MEPERIDINE HYDROCHLORIDE 25 MG/ML
12.5 INJECTION INTRAMUSCULAR; INTRAVENOUS; SUBCUTANEOUS ONCE
Status: DISCONTINUED | OUTPATIENT
Start: 2022-11-01 | End: 2022-11-02

## 2022-11-01 RX ORDER — ENOXAPARIN SODIUM 100 MG/ML
30 INJECTION SUBCUTANEOUS EVERY 12 HOURS
Status: DISCONTINUED | OUTPATIENT
Start: 2022-11-01 | End: 2022-11-02

## 2022-11-01 RX ORDER — METHOCARBAMOL 750 MG/1
750 TABLET, FILM COATED ORAL 3 TIMES DAILY
Status: DISCONTINUED | OUTPATIENT
Start: 2022-11-01 | End: 2022-11-03

## 2022-11-01 RX ORDER — DOCUSATE SODIUM 100 MG/1
100 CAPSULE, LIQUID FILLED ORAL DAILY
Status: DISCONTINUED | OUTPATIENT
Start: 2022-11-01 | End: 2022-11-01

## 2022-11-01 RX ORDER — ONDANSETRON 2 MG/ML
4 INJECTION INTRAMUSCULAR; INTRAVENOUS ONCE
Status: DISCONTINUED | OUTPATIENT
Start: 2022-11-01 | End: 2022-11-02

## 2022-11-01 RX ORDER — OXYCODONE HYDROCHLORIDE 5 MG/1
5 TABLET ORAL EVERY 6 HOURS PRN
Status: DISCONTINUED | OUTPATIENT
Start: 2022-11-01 | End: 2022-11-02

## 2022-11-01 RX ORDER — CEFAZOLIN SODIUM 2 G/50ML
SOLUTION INTRAVENOUS
Status: DISPENSED
Start: 2022-11-01 | End: 2022-11-01

## 2022-11-01 RX ORDER — HYDROMORPHONE HYDROCHLORIDE 2 MG/ML
INJECTION, SOLUTION INTRAMUSCULAR; INTRAVENOUS; SUBCUTANEOUS
Status: DISCONTINUED | OUTPATIENT
Start: 2022-11-01 | End: 2022-11-01

## 2022-11-01 RX ORDER — HYDROMORPHONE HYDROCHLORIDE 2 MG/ML
0.2 INJECTION, SOLUTION INTRAMUSCULAR; INTRAVENOUS; SUBCUTANEOUS EVERY 5 MIN PRN
Status: DISCONTINUED | OUTPATIENT
Start: 2022-11-01 | End: 2022-11-02

## 2022-11-01 RX ORDER — ACETAMINOPHEN 10 MG/ML
INJECTION, SOLUTION INTRAVENOUS
Status: DISCONTINUED | OUTPATIENT
Start: 2022-11-01 | End: 2022-11-01

## 2022-11-01 RX ORDER — ONDANSETRON HYDROCHLORIDE 2 MG/ML
INJECTION, SOLUTION INTRAMUSCULAR; INTRAVENOUS
Status: DISCONTINUED | OUTPATIENT
Start: 2022-11-01 | End: 2022-11-01

## 2022-11-01 RX ORDER — ONDANSETRON 2 MG/ML
4 INJECTION INTRAMUSCULAR; INTRAVENOUS EVERY 6 HOURS PRN
Status: DISCONTINUED | OUTPATIENT
Start: 2022-11-01 | End: 2022-11-03 | Stop reason: HOSPADM

## 2022-11-01 RX ORDER — DEXAMETHASONE SODIUM PHOSPHATE 4 MG/ML
INJECTION, SOLUTION INTRA-ARTICULAR; INTRALESIONAL; INTRAMUSCULAR; INTRAVENOUS; SOFT TISSUE
Status: DISCONTINUED | OUTPATIENT
Start: 2022-11-01 | End: 2022-11-01

## 2022-11-01 RX ORDER — HYDROMORPHONE HYDROCHLORIDE 2 MG/ML
0.5 INJECTION, SOLUTION INTRAMUSCULAR; INTRAVENOUS; SUBCUTANEOUS EVERY 5 MIN PRN
Status: DISCONTINUED | OUTPATIENT
Start: 2022-11-01 | End: 2022-11-02

## 2022-11-01 RX ADMIN — SENNOSIDES AND DOCUSATE SODIUM 2 TABLET: 50; 8.6 TABLET ORAL at 10:11

## 2022-11-01 RX ADMIN — Medication 100 MCG: at 07:11

## 2022-11-01 RX ADMIN — ALBUMIN HUMAN: 250 SOLUTION INTRAVENOUS at 07:11

## 2022-11-01 RX ADMIN — HYDROMORPHONE HYDROCHLORIDE 0.5 MG: 2 INJECTION, SOLUTION INTRAMUSCULAR; INTRAVENOUS; SUBCUTANEOUS at 08:11

## 2022-11-01 RX ADMIN — OXYCODONE 10 MG: 5 TABLET ORAL at 01:11

## 2022-11-01 RX ADMIN — MORPHINE SULFATE 2 MG: 4 INJECTION INTRAVENOUS at 12:11

## 2022-11-01 RX ADMIN — SODIUM CHLORIDE, SODIUM GLUCONATE, SODIUM ACETATE, POTASSIUM CHLORIDE AND MAGNESIUM CHLORIDE: 526; 502; 368; 37; 30 INJECTION, SOLUTION INTRAVENOUS at 07:11

## 2022-11-01 RX ADMIN — FENTANYL CITRATE 100 MCG: 50 INJECTION, SOLUTION INTRAMUSCULAR; INTRAVENOUS at 07:11

## 2022-11-01 RX ADMIN — METHOCARBAMOL 750 MG: 750 TABLET ORAL at 10:11

## 2022-11-01 RX ADMIN — OXYCODONE 5 MG: 5 TABLET ORAL at 09:11

## 2022-11-01 RX ADMIN — CALCIUM CHLORIDE INJECTION 0.5 G: 100 INJECTION, SOLUTION INTRAVENOUS at 08:11

## 2022-11-01 RX ADMIN — SENNOSIDES AND DOCUSATE SODIUM 2 TABLET: 50; 8.6 TABLET ORAL at 09:11

## 2022-11-01 RX ADMIN — ETOMIDATE 10 MG: 2 INJECTION INTRAVENOUS at 07:11

## 2022-11-01 RX ADMIN — ROCURONIUM BROMIDE 10 MG: 50 INJECTION INTRAVENOUS at 07:11

## 2022-11-01 RX ADMIN — METHOCARBAMOL 750 MG: 750 TABLET ORAL at 02:11

## 2022-11-01 RX ADMIN — PHENYLEPHRINE HYDROCHLORIDE 15 MCG/MIN: 10 INJECTION INTRAVENOUS at 07:11

## 2022-11-01 RX ADMIN — ACETAMINOPHEN 1000 MG: 10 INJECTION, SOLUTION INTRAVENOUS at 08:11

## 2022-11-01 RX ADMIN — ONDANSETRON 4 MG: 2 INJECTION INTRAMUSCULAR; INTRAVENOUS at 08:11

## 2022-11-01 RX ADMIN — PROPOFOL 50 MG: 10 INJECTION, EMULSION INTRAVENOUS at 07:11

## 2022-11-01 RX ADMIN — DEXAMETHASONE SODIUM PHOSPHATE 4 MG: 4 INJECTION, SOLUTION INTRA-ARTICULAR; INTRALESIONAL; INTRAMUSCULAR; INTRAVENOUS; SOFT TISSUE at 07:11

## 2022-11-01 RX ADMIN — ENOXAPARIN SODIUM 30 MG: 30 INJECTION SUBCUTANEOUS at 10:11

## 2022-11-01 RX ADMIN — SUGAMMADEX 200 MG: 100 INJECTION, SOLUTION INTRAVENOUS at 08:11

## 2022-11-01 RX ADMIN — ROCURONIUM BROMIDE 40 MG: 50 INJECTION INTRAVENOUS at 07:11

## 2022-11-01 RX ADMIN — ENOXAPARIN SODIUM 30 MG: 30 INJECTION SUBCUTANEOUS at 09:11

## 2022-11-01 RX ADMIN — HYDROMORPHONE HYDROCHLORIDE 0.5 MG: 2 INJECTION, SOLUTION INTRAMUSCULAR; INTRAVENOUS; SUBCUTANEOUS at 07:11

## 2022-11-01 RX ADMIN — METHOCARBAMOL 750 MG: 750 TABLET ORAL at 09:11

## 2022-11-01 NOTE — ANESTHESIA PROCEDURE NOTES
Intubation    Date/Time: 11/1/2022 7:12 AM  Performed by: Leighton Granados  Authorized by: Ines Tatum MD     Intubation:     Induction:  Intravenous    Intubated:  Postinduction    Mask Ventilation:  Easy with oral airway    Attempts:  1    Attempted By:  CRNA    Method of Intubation:  Video laryngoscopy    Blade:  Roman 3    Laryngeal View Grade: Grade IIA - cords partially seen      Difficult Airway Encountered?: No      Complications:  None    Airway Device:  Oral endotracheal tube    Airway Device Size:  7.0    Style/Cuff Inflation:  Cuffed (inflated to minimal occlusive pressure)    Inflation Amount (mL):  6    Tube secured:  21    Secured at:  The lips    Placement Verified By:  Capnometry    Complicating Factors:  Anterior larynx    Findings Post-Intubation:  BS equal bilateral

## 2022-11-01 NOTE — PLAN OF CARE
Reviewed therapy notes and dr notes. List of rehabs given to family who are at bedside with pt. They have a family member Azra who works in our therapy department and want to ask her for her imput. They will likely know which location by tomorrow.   Will follow up with pt/family for choice of rehabs

## 2022-11-01 NOTE — OP NOTE
OPERATIVE REPORT    Patient: Niecy Joshua   : 1935    MRN: [id number]  Date: 2022      Surgeon:Florentino Friend DO  Assistant: Juan De Guzman was essential, part of the procedure including deep hardware placement and deep closure.  No senior assistant was availible  Preoperative Diagnosis: : Closed left intertrochanteric femur fracture  Postoperative Diagnosis: Same  Procedure:  Treatment left intertrochanteric femur fracture with intramedullary nail CPT 52842  Anesthesiologist: Ines Tatum MD  OR Staff: Circulator: Yolanda Lockwood RN  Physician Assistant: Brooke De Guzman PA-C  Scrub Person: Conrad Braden  Implants:   Implant Name Type Inv. Item Serial No.  Lot No. LRB No. Used Action   WIRE GUIDE 3.2MM 400MM - [id number]  WIRE GUIDE 3.2MM 400MM  [employer]  Left 2 Implanted and Explanted   reaming modesto/ 3.8mm ball tip     [id number] Left 1 Implanted   10mm/130 degree ti ernestine tfna    SYNTHES [id number] Left 1 Implanted   traumacam v+ injection cannula for tfna     [id number] Left 1 Implanted   BLADE HELICAL PERF GOLD 110MM - [id number]  BLADE HELICAL PERF GOLD 110MM  [employer]  Left 1 Implanted   CEMENT TRAUMACEM V+ STRL 10ML - [id number]  CEMENT TRAUMACEM V+ STRL 10ML  SYNTHES  Left 1 Implanted   SCREW KRISTA XL25 5X44MM - [id number]  SCREW KRISTA XL25 5X44MM  SYNTHES  Left 1 Implanted   SCREW XL25 IM NAIL KRISTA 5X54MM - [id number]  SCREW XL25 IM NAIL KRISAT 5X54MM  [employer]  Left 1 Implanted     EBL: 300cc  Complications: None  Disposition: To PACU, stable    Indications: Niecy Joshua is a 87 y.o. female presenting with the aforementioned injuries. The procedure is indicated to best obtain and maintain stability of the femur while allowing early ROM.  The patient is awake and alert. After thorough discussion of the risks, benefits, expected outcomes, and alternatives to surgical intervention, the patient agreed to proceed with surgical treatment.  Specific risks  discussed included, but were not limited to: superficial or deep infection, wound healing complications, DVT/PE, significant bleeding requiring transfusion, damage to named anatomic structures in the immediate area including named neurovascular structures, implant failure, and general risks of anesthesia.  The patient voiced understanding and written as well as verbal consent was obtained by myself prior to the procedure.    Procedure in Detail:  The patient's left lower extremity was marked by me in the preoperative area.  She was taken to the operating room, and after satisfactory induction of anesthesia, the patient was placed in the supine position with a small bump under the ipsilateral hip.   The ipsilateral lower extremity was then sterilely prepped and draped in the usual sterile fashion.  Standard time out procedure was performed confirming the correct surgeon, site, side, patient ID, procedure, and administration of antibiotics.       A 3 cm longitudinal incision was made just proximal to the greater trochanter.  The subcutaneous tissue and gluteal fascia were incised.  A threaded guide pin was then placed in the tip of the greater trochanter and extended and introduced into the proximal femur under AP and lateral fluoroscopy.  We did use open means to reduce this fracture due to the multiple fragments in the coronal and sagittal planes.  Patient has severe comminution and poor bone quality.  The Synthes one-step reamer was then used to ream proximally. A ball-tipped guide modesto was then placed through the entry site down to the physeal scar of the femur.  This was measured and then was reamed .  A Synthes TFN  appropiate size  was then passed over the guidewire, which was subsequently removed.  The proximal interlocking device was then placed and a 2-cm longitudinal incision was made over the lateral aspect of the proximal thigh and the fascia tiffanie was incised.  A threaded guide pin was then placed through  the lateral cortex of the femur through the femoral neck and into the femoral head in the center-center position.  A helical blade was then placed under fluoroscopy and satisfactory position was noted on AP and lateral fluoroscopy and the setscrew was then set. The proximal interlocking device was then removed.    Distal interlocking screws were done with  two single lateral to medial interlocking screw under fluoroscopic guidance.  All wounds were then thoroughly irrigated.  Subcutaneous tissues were closed with interrupted inverted of 2-0 Monocryl.  Skin was approximated using skin staples.  Sterile dressing was applied.  General anesthesia was reversed and she was returned to the Postanesthesia Care Unit in stable condition.      All sponge and needle counts were correct at the end of the case.  I was present and participated in all aspects of the procedure.    Prognosis:  The patient will be kept WBAT on the ipsilateral extremity.  DVT prophylaxis is indicated for this patient and procedure.  The patient is at risk of pain, infection, and nonunion, and we will watch for all of these, amongst other issues, as the patient continues to heal.      This note/OR report was created with the assistance of  voice recognition software or phone  dictation.  There may be transcription errors as a result of using this technology however minimal. Effort has been made to assure accuracy of transcription but any obvious errors or omissions should be clarified with the author of the document.       Florentino Friend, DO  Orthopedic Trauma Surgery

## 2022-11-01 NOTE — PLAN OF CARE
Goals to be met by: 2022     Patient will increase functional independence with mobility by performin. Supine to sit with Stand-by Assistance  2. Sit to supine with Stand-by Assistance  3. Sit to stand transfer with Stand-by Assistance  4. Gait  x 200 feet with Stand-by Assistance using Rolling Walker.   5. Ascend/descend 5 stair with left Handrails Stand-by Assistance using Rolling Walker.

## 2022-11-01 NOTE — ED NOTES
Pt lying in bed, AAO X 3, Resp even and non labored. NVI/PMS LLE. Pt refuses any pain medication at this time, Pt educated on benefits of not waiting until pain is unberable, pt again refused pain medication. PO fluids given per request. Son at bedside. Gcs 15 nadn

## 2022-11-01 NOTE — PT/OT/SLP EVAL
Physical Therapy Evaluation    Patient Name:  Niecy Joshua   MRN:  [id number]    Recommendations:     Discharge Recommendations:  rehabilitation facility, home with home health   Discharge Equipment Recommendations: walker, rolling   Barriers to discharge:  acuity of illness    Assessment:     Niecy Joshua is a 87 y.o. female admitted with a medical diagnosis of Closed comminuted intertrochanteric fracture of left femur.  She presents with the following impairments/functional limitations:  weakness, impaired endurance, impaired self care skills, impaired functional mobility, gait instability, impaired balance, pain, decreased ROM, orthopedic precautions.    Rehab Prognosis: Good; patient would benefit from acute skilled PT services to address these deficits and reach maximum level of function.    Recent Surgery: Procedure(s) (LRB):  INSERTION, INTRAMEDULLARY AMA, FEMUR (Left) Day of Surgery    Plan:     During this hospitalization, patient to be seen daily (/BID) to address the identified rehab impairments via gait training, therapeutic activities, therapeutic exercises, neuromuscular re-education and progress toward the following goals:    Plan of Care Expires:  12/01/22    Subjective     Chief Complaint: pain  Patient/Family Comments/goals: to get stronger  Pain/Comfort:  Pain Rating 1: 5/10  Location - Side 1: Left  Location 1: hip    Patients cultural, spiritual, Jainism conflicts given the current situation: no    Living Environment:  Pt lives alone in a SLH with 5 steps to enter with left-sided handrails.  Prior to admission, patients level of function was independent.  Equipment used at home: none.  DME owned (not currently used): rolling walker.  Upon discharge, patient will have assistance from family.    Objective:     Communicated with RN prior to session.  Patient found HOB elevated with peripheral IV, PureWick  upon PT entry to room.    General Precautions: Standard,     Orthopedic  Precautions:LLE weight bearing as tolerated   Braces:    Respiratory Status: Room air    BP: 115/69 mmHg    Exams:  Cognitive Exam:  Patient is oriented to Person, Place, Time, and Situation  RLE ROM: WFL  RLE Strength: WFL    Functional Mobility:  Bed Mobility:     Supine to Sit: minimum assistance  Sit to Supine: minimum assistance  Transfers:     Sit to Stand:  minimum assistance with rolling walker. Pt performed sit<>stand x2 requiring Eileen both times.  Gait: pt performed pre-gt side steps to the right towards the HOB with a RW and Eileen. Limited by patient fatigue from sx.       AM-PAC 6 CLICK MOBILITY  Total Score:14     Patient left HOB elevated with all lines intact, call button in reach, RN notified, and family present.    GOALS:   Multidisciplinary Problems       Physical Therapy Goals          Problem: Physical Therapy    Goal Priority Disciplines Outcome Goal Variances Interventions   Physical Therapy Goal     PT, PT/OT      Description: Goals to be met by: 2022     Patient will increase functional independence with mobility by performin. Supine to sit with Stand-by Assistance  2. Sit to supine with Stand-by Assistance  3. Sit to stand transfer with Stand-by Assistance  4. Gait  x 200 feet with Stand-by Assistance using Rolling Walker.   5. Ascend/descend 5 stair with left Handrails Stand-by Assistance using Rolling Walker.                          History:     Past Medical History:   Diagnosis Date    Cardiac arrhythmia     Low vitamin D level     Osteoporosis     Other pulmonary embolism without acute cor pulmonale        Past Surgical History:   Procedure Laterality Date    BREAST MASS EXCISION      x3  benign    LAPAROSCOPIC REPAIR OF INGUINAL HERNIA Bilateral        Time Tracking:     PT Received On: 22  PT Start Time: 1316     PT Stop Time: 1333  PT Total Time (min): 17 min     Billable Minutes: Evaluation , moderate complexity      2022

## 2022-11-01 NOTE — TRANSFER OF CARE
"Anesthesia Transfer of Care Note    Patient: Niecy Joshua    Procedure(s) Performed: Procedure(s) (LRB):  INSERTION, INTRAMEDULLARY AMA, FEMUR (Left)    Patient location: PACU    Anesthesia Type: general    Transport from OR: Transported from OR on room air with adequate spontaneous ventilation    Post pain: adequate analgesia    Post assessment: no apparent anesthetic complications and tolerated procedure well    Post vital signs: stable    Level of consciousness: lethargic    Nausea/Vomiting: no nausea/vomiting    Complications: none    Transfer of care protocol was followed      Last vitals:   Visit Vitals  BP (!) 140/79   Pulse 92   Temp 36.8 °C (98.3 °F) (Oral)   Resp 18   Ht 5' 7" (1.702 m)   Wt 74.8 kg (165 lb)   SpO2 95%   BMI 25.84 kg/m²     "

## 2022-11-01 NOTE — ANESTHESIA PREPROCEDURE EVALUATION
11/01/2022  Niecy Joshua is a 87 y.o., female.  Osteoporosis  H/o DVT 2020 to LLE    Last Echo:   Normal LV size and function- LVEF 60%  Moderately enlarged right atrium size.  Mild-Moderately enlarged right ventricle cavity. IVC dilated  There is mild to moderate tricuspid regurgitation with estimated RVSP of 61 mm Hg.- in setting of DVT, prob c/w PE    Pre-op Assessment    I have reviewed the Patient Summary Reports.     I have reviewed the Nursing Notes. I have reviewed the NPO Status.   I have reviewed the Medications.     Review of Systems  Anesthesia Hx:  No problems with previous Anesthesia    Social:  Non-Smoker    Cardiovascular:   Dysrhythmias        Physical Exam  General: Well nourished, Cooperative, Alert and Oriented    Airway:  Mallampati: III   Mouth Opening: Small, but > 3cm  TM Distance: 4 - 6 cm  Tongue: Normal  Neck ROM: Extension Decreased, Left Lateral Motion Decreased, Right Lateral Motion Decreased        Anesthesia Plan  Type of Anesthesia, risks & benefits discussed:    Anesthesia Type: Gen ETT  Intra-op Monitoring Plan: Standard ASA Monitors  Post Op Pain Control Plan: multimodal analgesia  Induction:  IV  Airway Plan: Direct, Post-Induction  Informed Consent: Informed consent signed with the Patient and all parties understand the risks and agree with anesthesia plan.  All questions answered. Patient consented to blood products? Yes  ASA Score: 3  Day of Surgery Review of History & Physical: H&P Update referred to the surgeon/provider.    Ready For Surgery From Anesthesia Perspective.     .

## 2022-11-01 NOTE — PROGRESS NOTES
[facility]  HOSPITAL MEDICINE  PROGRESS NOTE        CHIEF COMPLAINT   Hospital follow up    HOSPITAL COURSE   Niecy Joshua is a 87 y.o. female with a past medical history of DVT, osteoporosis, and vitamin-D deficiency presented to [facility] on 10/31/2022 for mechanical fall.  Patient reports she was walking to the bathroom when she fell onto her left hip.  Patient denies hitting head, LOC, chest pain, shortness of breath, abdominal pain, nausea, vomiting, dizziness, syncope, and fever.  Patient reports improvement of left hip pain after pain medications given in ED.  Initial vital signs in the ED were /68, pulse 70, respirations 16, temperature 36.8° C, and SpO2 97%.  Labs revealed WBC 14.8, chloride 109, BUN 22.4, and glucose 148.  Chest x-ray revealed no acute pulmonary process.  Left hip x-ray revealed comminuted left intertrochanteric femur fracture.  Orthopedics was consulted with plans for surgery tomorrow. Patient was admitted to hospital medicine service for further medical management.   November 1st she underwent left intertrochanteric femur fracture intramedullary nailing with Dr. Friend.    Today  3 family members present at bedside.  Otherwise she is doing well no pain at this time.  We will see how she does with therapy to make determination regarding disposition.  She has a history of osteoporosis and she received some infusion that she does not know the name of.        OBJECTIVE/PHYSICAL EXAM     VITAL SIGNS (MOST RECENT):  Temp: 97.2 °F (36.2 °C) (11/01/22 0855)  Pulse: 82 (11/01/22 0930)  Resp: (!) 21 (11/01/22 0930)  BP: (!) 108/53 (11/01/22 0930)  SpO2: 95 % (11/01/22 0930)   VITAL SIGNS (24 HOUR RANGE):  Temp:  [97.2 °F (36.2 °C)-98.3 °F (36.8 °C)] 97.2 °F (36.2 °C)  Pulse:  [72-92] 82  Resp:  [10-22] 21  SpO2:  [93 %-100 %] 95 %  BP: ()/(52-95) 108/53   GENERAL: In no acute distress, afebrile  HEENT:  CHEST: Clear to auscultation bilaterally  HEART: S1,  S2, no appreciable murmur  ABDOMEN: Soft, nontender, BS +  MSK: Warm, no lower extremity edema, no clubbing or cyanosis  NEUROLOGIC: Alert and oriented x4, moving all extremities with good strength   INTEGUMENTARY:  PSYCHIATRY:        ASSESSMENT/PLAN   Left comminuted intertrochanteric femur fracture-status post intramedullary nailing November 1     Orthopedic surgery following.    Resume Eliquis home dose for long-term DVT prevention once cleared by Orthopedic surgery.  Diagnosed with DVT couple years ago.   consult for disposition planning    DVT prophylaxis:  Lovenox 30 b.i.d.    Anticipated discharge and disposition:   __________________________________________________________________________    LABS/MICRO/MEDS/DIAGNOSTICS       LABS  Recent Labs     11/01/22  0400   *   K 5.0   CHLORIDE 105   CO2 23   BUN 30.7*   CREATININE 0.89   GLUCOSE 135*   CALCIUM 8.9   ALKPHOS 72   AST 19   ALT 19   ALBUMIN 3.5     Recent Labs     11/01/22  0400   WBC 8.0   RBC 3.37*   HCT 32.3*   MCV 95.8*          MICROBIOLOGY  Microbiology Results (last 7 days)       ** No results found for the last 168 hours. **            MEDICATIONS   ceFAZolin 2 g/50mL Dextrose IVPB        docusate sodium  100 mg Oral Daily    enoxaparin  30 mg Subcutaneous Q12H    melatonin  3 mg Oral Nightly    meperidine  12.5 mg Intravenous Once    methocarbamoL  750 mg Oral TID    ondansetron  4 mg Intravenous Once    senna-docusate 8.6-50 mg  2 tablet Oral BID      INFUSIONS      DIAGNOSTIC TESTS  X-Ray Femur 2 View Left   Final Result      Improved alignment following internal fixation of the left femur.         Electronically signed by: Mary Melton   Date:    11/01/2022   Time:    09:34      SURG FL Surgery Fluoro Usage   Final Result      X-Ray Hip 2 or 3 views Left (with Pelvis when performed)   Final Result      Acute, displaced and comminuted left femoral neck intertrochanteric fracture         Electronically signed  by: Pankaj Barker MD   Date:    10/31/2022   Time:    13:07      X-Ray Chest 1 View   Final Result      No active pulmonary disease         Electronically signed by: Eliezer Granda   Date:    10/31/2022   Time:    09:31               All diagnosis and differential diagnosis have been reviewed; assessment and plan has been documented. I have personally reviewed the labs and test results that are presently available; I have reviewed the patients medication list. I have reviewed the consulting providers response and recommendations. I have reviewed or attempted to review medical records based upon their availability.  All of the patient's questions have been addressed and answered. Patient's is agreeable to the above stated plan. I will continue to monitor closely and make adjustments to medical management as needed.  This document was created using M*Modal Fluency Direct.  Transcription errors may have been made.  Please contact me if any questions may rise regarding documentation to clarify verbiage.        Emile Stokes MD   11/01/2022   Internal Medicine

## 2022-11-01 NOTE — ANESTHESIA POSTPROCEDURE EVALUATION
Anesthesia Post Evaluation    Patient: Niecy Joshua    Procedure(s) Performed: Procedure(s) (LRB):  INSERTION, INTRAMEDULLARY AMA, FEMUR (Left)    Final Anesthesia Type: general      Patient location during evaluation: PACU  Patient participation: Yes- Able to Participate  Level of consciousness: awake and alert  Post-procedure vital signs: reviewed and stable  Pain management: adequate  Airway patency: patent      Anesthetic complications: no      Cardiovascular status: hemodynamically stable  Respiratory status: unassisted  Hydration status: euvolemic  Follow-up not needed.          Vitals Value Taken Time   /61 11/01/22 1200   Temp 36.2 °C (97.2 °F) 11/01/22 0855   Pulse 69 11/01/22 1200   Resp 18 11/01/22 1200   SpO2 97 % 11/01/22 1200         Event Time   Out of Recovery 11/01/2022 09:25:00         Pain/Yomi Score: Pain Rating Prior to Med Admin: 5 (11/1/2022 12:00 AM)  Pain Rating Post Med Admin: 2 (11/1/2022 12:30 AM)  Yomi Score: 9 (11/1/2022  9:25 AM)

## 2022-11-01 NOTE — CONSULTS
[facility]  Orthopedic Trauma  Consult Note    Patient Name: Niecy Joshua  MRN: [id number]  Admission Date: 10/31/2022  Hospital Length of Stay: 1 days  Attending Provider: Emile Sotkes MD  Primary Care Provider: King Meza MD        Consults  Subjective:         Chief Complaint:   Chief Complaint   Patient presents with    Fall     Pt fell ambulating to restroom, denies trip and fall or weakness. Pt denies hitting head, on Eliquis. Left leg shortened and rotated. Palpable pulse. GCS 15        HPI:  Patient was walking to the restroom when she fell onto her left hip nonambulatory seen.  She states she was crawling herself around the house trying to find help.  Pain is isolated to left hip no previous injuries in this area.  Denies numbness tingling.  Dull achy pain with muscle spasms.    Past Medical History:   Diagnosis Date    Cardiac arrhythmia     Low vitamin D level     Osteoporosis     Other pulmonary embolism without acute cor pulmonale        Past Surgical History:   Procedure Laterality Date    BREAST MASS EXCISION      x3  benign    LAPAROSCOPIC REPAIR OF INGUINAL HERNIA Bilateral        Review of patient's allergies indicates:   Allergen Reactions    Ak-mycin     Beta-blockers (beta-adrenergic blocking agts)     Zyrtec [cetirizine]        Current Facility-Administered Medications   Medication    acetaminophen tablet 650 mg    acetaminophen tablet 650 mg    ceFAZolin 2 g/50mL Dextrose IVPB (ANCEF) 2 gram/50 mL IVPB PgBk    dextrose 50% injection 12.5 g    dextrose 50% injection 25 g    enoxaparin injection 40 mg    glucagon (human recombinant) injection 1 mg    glucose chewable tablet 16 g    glucose chewable tablet 24 g    melatonin tablet 3 mg    morphine injection 2 mg    ondansetron injection 4 mg    senna-docusate 8.6-50 mg per tablet 2 tablet     Family History       Problem Relation (Age of Onset)    Clotting disorder Mother    No Known Problems Father, Sister  "         Tobacco Use    Smoking status: Never    Smokeless tobacco: Never   Substance and Sexual Activity    Alcohol use: Never    Drug use: Never    Sexual activity: Not on file       ROS:  Constitutional: Denies fever chills  Eyes: No change in vision  ENT: No ringing or current infections  CV: No chest pain  Resp: No labored breathing  MSK: Pain evident at site of injury located in HPI,   Integ: No signs of abrasions or lacerations  Neuro: No numbness or tingling  Lymphatic: No swelling outside the area of injury   Objective:     Vital Signs (Most Recent):  Temp: 98.3 °F (36.8 °C) (11/01/22 0542)  Pulse: 92 (11/01/22 0637)  Resp: 18 (11/01/22 0637)  BP: (!) 140/79 (11/01/22 0637)  SpO2: 95 % (11/01/22 0637)   Vital Signs (24h Range):  Temp:  [98.2 °F (36.8 °C)-98.3 °F (36.8 °C)] 98.3 °F (36.8 °C)  Pulse:  [70-92] 92  Resp:  [15-22] 18  SpO2:  [93 %-99 %] 95 %  BP: (100-143)/(66-97) 140/79     Weight: 74.8 kg (165 lb)  Height: 5' 7" (170.2 cm)  Body mass index is 25.84 kg/m².    No intake or output data in the 24 hours ending 11/01/22 0701    Ortho/SPM Exam  General the patient is alert and oriented x3 no acute distress nontoxic-appearing appropriate affect.    Constitutional: Vital signs are examined and stable.  Resp: No signs of labored breathing                 LLE: -Skin:  No signs of new abrasions or lacerations, no scars           -MSK:  EHL/FHL, Gastroc/Tib anterior Strength 5/5           -Neuro:  Sensation intact to light touch L3-S1 dermatomes           -Lymphatic: No signs of lymphadenopathy           -CV: Capillary refill is less than 2 seconds. DP/PT pulses 2/4. Compartments soft and compressible      Significant Labs:   Recent Lab Results         11/01/22  0400   10/31/22  1031   10/31/22  0841        Albumin/Globulin Ratio 1.8     1.9       Albumin 3.5     3.7       Alkaline Phosphatase 72     82       ALT 19     23       aPTT   27.1  Comment: For Minimal Heparin Infusion, the goal aPTT 64-85 " seconds corresponds to an anti-Xa of 0.3-0.5.    For Low Intensity and High Intensity Heparin, the goal aPTT  seconds corresponds to an anti-Xa of 0.3-0.7         AST 19     21       Baso # 0.03     0.06       Basophil % 0.4     0.4       BILIRUBIN TOTAL 1.6     1.1       BUN 30.7     22.4       Calcium 8.9     9.0       Chloride 105     109       CO2 23     24       Creatinine 0.89     0.70       eGFR >60     >60       Eos # 0.01     0.02       Eosinophil % 0.1     0.1       Globulin, Total 1.9     2.0       Glucose 135     148       Group & Rh   A NEG         Hematocrit 32.3     37.6       Hemoglobin 10.5     12.2       Immature Grans (Abs) 0.03     0.11       Immature Granulocytes 0.4     0.7       Indirect Ke GEL   NEG         INR   1.04         Lymph # 1.93     1.07       LYMPH % 24.1     7.2       MCH 31.2     31.0       MCHC 32.5     32.4       MCV 95.8     95.7       Mono # 1.09     1.01       Mono % 13.6     6.8       MPV 11.3     11.4       Neut # 4.9     12.5       Neut % 61.4     84.8       nRBC 0.0     0.0       Platelets 178     212       Potassium 5.0     4.0       PROTEIN TOTAL 5.4     5.7       Protime   13.5         RBC 3.37     3.93       RDW 13.1     12.9       Sodium 135     140       WBC 8.0     14.8             All pertinent labs within the past 24 hours have been reviewed.  Recent Lab Results         11/01/22  0400   10/31/22  1031   10/31/22  0841        Albumin/Globulin Ratio 1.8     1.9       Albumin 3.5     3.7       Alkaline Phosphatase 72     82       ALT 19     23       aPTT   27.1  Comment: For Minimal Heparin Infusion, the goal aPTT 64-85 seconds corresponds to an anti-Xa of 0.3-0.5.    For Low Intensity and High Intensity Heparin, the goal aPTT  seconds corresponds to an anti-Xa of 0.3-0.7         AST 19     21       Baso # 0.03     0.06       Basophil % 0.4     0.4       BILIRUBIN TOTAL 1.6     1.1       BUN 30.7     22.4       Calcium 8.9     9.0       Chloride  105     109       CO2 23     24       Creatinine 0.89     0.70       eGFR >60     >60       Eos # 0.01     0.02       Eosinophil % 0.1     0.1       Globulin, Total 1.9     2.0       Glucose 135     148       Group & Rh   A NEG         Hematocrit 32.3     37.6       Hemoglobin 10.5     12.2       Immature Grans (Abs) 0.03     0.11       Immature Granulocytes 0.4     0.7       Indirect Ke GEL   NEG         INR   1.04         Lymph # 1.93     1.07       LYMPH % 24.1     7.2       MCH 31.2     31.0       MCHC 32.5     32.4       MCV 95.8     95.7       Mono # 1.09     1.01       Mono % 13.6     6.8       MPV 11.3     11.4       Neut # 4.9     12.5       Neut % 61.4     84.8       nRBC 0.0     0.0       Platelets 178     212       Potassium 5.0     4.0       PROTEIN TOTAL 5.4     5.7       Protime   13.5         RBC 3.37     3.93       RDW 13.1     12.9       Sodium 135     140       WBC 8.0     14.8                Significant Imaging: I have reviewed all pertinent imaging results/findings.  X-Ray Chest 1 View    Result Date: 10/31/2022  EXAMINATION: XR CHEST 1 VIEW CPT 35993 CLINICAL HISTORY: fall; FINDINGS: There is prominence of the superior mediastinum with prominent soft tissue density in the right paratracheal region likely related to tortuous brachycephalic vessels cardiac silhouette is not enlarged lung fields are clear and free of gross infiltrates atelectasis or effusions     No active pulmonary disease Electronically signed by: Eliezer Granda Date:    10/31/2022 Time:    09:31    X-Ray Hip 2 or 3 views Left (with Pelvis when performed)    Result Date: 10/31/2022  EXAMINATION: Three views pelvis and left hip CLINICAL HISTORY: Pain COMPARISON: None FINDINGS: There is an acute, displaced and comminuted left femoral neck intertrochanteric fracture.  No dislocation.     Acute, displaced and comminuted left femoral neck intertrochanteric fracture Electronically signed by: Pankaj Barker MD Date:    10/31/2022  Time:    13:07       Assessment/Plan:     Active Diagnoses:    Diagnosis Date Noted POA    PRINCIPAL PROBLEM:  Closed comminuted intertrochanteric fracture of left femur [S72.142A] 11/01/2022 Unknown      Problems Resolved During this Admission:         Patient has a left proximal femur fracture intertrochanteric type.  Patient was not ambulatory at the scene.  She is on bisphosphonates.  No sign of pathologic fracture from bisphosphonate.  Patient has been NPO nonweightbearing overnight.  On the schedule today for surgery.    I explained that surgery and the nature of their condition are not without risks. These include, but are not limited to, bleeding, infection, neurovascular compromise, malunion, nonunion, hardware complications, wound complications, scarring, cosmetic defects, need for later and/or repeated surgeries, pain, loss of ROM, loss of function, PTOA, deformity, stance/gait and/or functional abnormalities, thromboembolic complications, compartment syndrome, loss of limb, loss of life, anesthetic complications, and other imponderables. I explained that these can occur despite the adequacy of treatments rendered, and that their risks are heightened given the nature of their condition. They verbalized understanding. They would like to continue with surgery at this time. If appropriate family was involved with surgical discussion.             This note/OR report was created with the assistance of  voice recognition software or phone  dictation.  There may be transcription errors as a result of using this technology however minimal. Effort has been made to assure accuracy of transcription but any obvious errors or omissions should be clarified with the author of the document.       Florentino Friend, DO   Orthopedic Trauma Surgery  [facility]

## 2022-11-02 LAB
ABS NEUT (OLG): 9.12 X10(3)/MCL (ref 2.1–9.2)
ALBUMIN SERPL-MCNC: 3.6 GM/DL (ref 3.4–4.8)
ALBUMIN/GLOB SERPL: 2.1 RATIO (ref 1.1–2)
ALP SERPL-CCNC: 61 UNIT/L (ref 40–150)
ALT SERPL-CCNC: 17 UNIT/L (ref 0–55)
AST SERPL-CCNC: 23 UNIT/L (ref 5–34)
BILIRUBIN DIRECT+TOT PNL SERPL-MCNC: 1.5 MG/DL
BUN SERPL-MCNC: 36.4 MG/DL (ref 9.8–20.1)
CALCIUM SERPL-MCNC: 8.9 MG/DL (ref 8.4–10.2)
CHLORIDE SERPL-SCNC: 107 MMOL/L (ref 98–107)
CO2 SERPL-SCNC: 21 MMOL/L (ref 23–31)
CREAT SERPL-MCNC: 0.97 MG/DL (ref 0.55–1.02)
DEPRECATED CALCIDIOL+CALCIFEROL SERPL-MC: 30.4 NG/ML (ref 30–80)
ERYTHROCYTE [DISTWIDTH] IN BLOOD BY AUTOMATED COUNT: 13.2 % (ref 11.5–17)
GFR SERPLBLD CREATININE-BSD FMLA CKD-EPI: 57 MLS/MIN/1.73/M2
GLOBULIN SER-MCNC: 1.7 GM/DL (ref 2.4–3.5)
GLUCOSE SERPL-MCNC: 139 MG/DL (ref 82–115)
HCT VFR BLD AUTO: 25.1 % (ref 37–47)
HGB BLD-MCNC: 8.1 GM/DL (ref 12–16)
IMM GRANULOCYTES # BLD AUTO: 0.04 X10(3)/MCL (ref 0–0.04)
IMM GRANULOCYTES NFR BLD AUTO: 0.4 %
INSTRUMENT WBC (OLG): 10.6 X10(3)/MCL
LYMPHOCYTES NFR BLD MANUAL: 0.95 X10(3)/MCL
LYMPHOCYTES NFR BLD MANUAL: 9 %
MCH RBC QN AUTO: 31.2 PG (ref 27–31)
MCHC RBC AUTO-ENTMCNC: 32.3 MG/DL (ref 33–36)
MCV RBC AUTO: 96.5 FL (ref 80–94)
MONOCYTES NFR BLD MANUAL: 0.53 X10(3)/MCL (ref 0.1–1.3)
MONOCYTES NFR BLD MANUAL: 5 %
NEUTROPHILS NFR BLD MANUAL: 86 %
NRBC BLD AUTO-RTO: 0 %
PLATELET # BLD AUTO: 165 X10(3)/MCL (ref 130–400)
PLATELET # BLD EST: NORMAL 10*3/UL
PMV BLD AUTO: 11.8 FL (ref 7.4–10.4)
POIKILOCYTOSIS BLD QL SMEAR: ABNORMAL
POTASSIUM SERPL-SCNC: 5.2 MMOL/L (ref 3.5–5.1)
PROT SERPL-MCNC: 5.3 GM/DL (ref 5.8–7.6)
RBC # BLD AUTO: 2.6 X10(6)/MCL (ref 4.2–5.4)
RBC MORPH BLD: ABNORMAL
SODIUM SERPL-SCNC: 137 MMOL/L (ref 136–145)
STOMATOCYTES (OLG): ABNORMAL
WBC # SPEC AUTO: 10.6 X10(3)/MCL (ref 4.5–11.5)

## 2022-11-02 PROCEDURE — 85025 COMPLETE CBC W/AUTO DIFF WBC: CPT | Performed by: PHYSICIAN ASSISTANT

## 2022-11-02 PROCEDURE — 25000003 PHARM REV CODE 250: Performed by: NURSE PRACTITIONER

## 2022-11-02 PROCEDURE — 97110 THERAPEUTIC EXERCISES: CPT

## 2022-11-02 PROCEDURE — 63600175 PHARM REV CODE 636 W HCPCS: Performed by: PHYSICIAN ASSISTANT

## 2022-11-02 PROCEDURE — 80053 COMPREHEN METABOLIC PANEL: CPT | Performed by: PHYSICIAN ASSISTANT

## 2022-11-02 PROCEDURE — 63600175 PHARM REV CODE 636 W HCPCS: Performed by: INTERNAL MEDICINE

## 2022-11-02 PROCEDURE — 25000003 PHARM REV CODE 250: Performed by: PHYSICIAN ASSISTANT

## 2022-11-02 PROCEDURE — 85027 COMPLETE CBC AUTOMATED: CPT | Performed by: PHYSICIAN ASSISTANT

## 2022-11-02 PROCEDURE — 97530 THERAPEUTIC ACTIVITIES: CPT

## 2022-11-02 PROCEDURE — 97166 OT EVAL MOD COMPLEX 45 MIN: CPT

## 2022-11-02 PROCEDURE — 36415 COLL VENOUS BLD VENIPUNCTURE: CPT | Performed by: PHYSICIAN ASSISTANT

## 2022-11-02 PROCEDURE — 82306 VITAMIN D 25 HYDROXY: CPT | Performed by: INTERNAL MEDICINE

## 2022-11-02 PROCEDURE — 25000003 PHARM REV CODE 250: Performed by: INTERNAL MEDICINE

## 2022-11-02 PROCEDURE — S5010 5% DEXTROSE AND 0.45% SALINE: HCPCS | Performed by: INTERNAL MEDICINE

## 2022-11-02 PROCEDURE — 11000001 HC ACUTE MED/SURG PRIVATE ROOM

## 2022-11-02 RX ORDER — CHOLECALCIFEROL (VITAMIN D3) 25 MCG
5000 TABLET ORAL DAILY
Status: DISCONTINUED | OUTPATIENT
Start: 2022-11-02 | End: 2022-11-03 | Stop reason: HOSPADM

## 2022-11-02 RX ORDER — PANTOPRAZOLE SODIUM 40 MG/1
40 TABLET, DELAYED RELEASE ORAL DAILY
Status: DISCONTINUED | OUTPATIENT
Start: 2022-11-02 | End: 2022-11-03 | Stop reason: HOSPADM

## 2022-11-02 RX ORDER — OXYCODONE HYDROCHLORIDE 5 MG/1
5 TABLET ORAL EVERY 4 HOURS PRN
Status: DISCONTINUED | OUTPATIENT
Start: 2022-11-02 | End: 2022-11-03 | Stop reason: HOSPADM

## 2022-11-02 RX ORDER — METOCLOPRAMIDE HYDROCHLORIDE 5 MG/ML
5 INJECTION INTRAMUSCULAR; INTRAVENOUS
Status: DISCONTINUED | OUTPATIENT
Start: 2022-11-02 | End: 2022-11-03

## 2022-11-02 RX ADMIN — METHOCARBAMOL 750 MG: 750 TABLET ORAL at 03:11

## 2022-11-02 RX ADMIN — METHOCARBAMOL 750 MG: 750 TABLET ORAL at 08:11

## 2022-11-02 RX ADMIN — METOCLOPRAMIDE 5 MG: 5 INJECTION, SOLUTION INTRAMUSCULAR; INTRAVENOUS at 08:11

## 2022-11-02 RX ADMIN — SODIUM ZIRCONIUM CYCLOSILICATE 10 G: 10 POWDER, FOR SUSPENSION ORAL at 10:11

## 2022-11-02 RX ADMIN — Medication: at 04:11

## 2022-11-02 RX ADMIN — METOCLOPRAMIDE 5 MG: 5 INJECTION, SOLUTION INTRAMUSCULAR; INTRAVENOUS at 04:11

## 2022-11-02 RX ADMIN — Medication: at 08:11

## 2022-11-02 RX ADMIN — APIXABAN 5 MG: 5 TABLET, FILM COATED ORAL at 08:11

## 2022-11-02 RX ADMIN — OXYCODONE 5 MG: 5 TABLET ORAL at 04:11

## 2022-11-02 RX ADMIN — OXYCODONE 5 MG: 5 TABLET ORAL at 09:11

## 2022-11-02 RX ADMIN — ONDANSETRON 4 MG: 2 INJECTION INTRAMUSCULAR; INTRAVENOUS at 02:11

## 2022-11-02 RX ADMIN — CHOLECALCIFEROL TAB 25 MCG (1000 UNIT) 5000 UNITS: 25 TAB at 08:11

## 2022-11-02 RX ADMIN — PANTOPRAZOLE SODIUM 40 MG: 40 TABLET, DELAYED RELEASE ORAL at 10:11

## 2022-11-02 RX ADMIN — ONDANSETRON 4 MG: 2 INJECTION INTRAMUSCULAR; INTRAVENOUS at 09:11

## 2022-11-02 RX ADMIN — ENOXAPARIN SODIUM 30 MG: 30 INJECTION SUBCUTANEOUS at 08:11

## 2022-11-02 RX ADMIN — SODIUM ZIRCONIUM CYCLOSILICATE 10 G: 10 POWDER, FOR SUSPENSION ORAL at 04:11

## 2022-11-02 RX ADMIN — OXYCODONE 5 MG: 5 TABLET ORAL at 08:11

## 2022-11-02 RX ADMIN — DEXTROSE AND SODIUM CHLORIDE 1000 ML: 5; 450 INJECTION, SOLUTION INTRAVENOUS at 10:11

## 2022-11-02 RX ADMIN — OXYCODONE 5 MG: 5 TABLET ORAL at 12:11

## 2022-11-02 RX ADMIN — SENNOSIDES AND DOCUSATE SODIUM 2 TABLET: 50; 8.6 TABLET ORAL at 08:11

## 2022-11-02 RX ADMIN — METOCLOPRAMIDE 5 MG: 5 INJECTION, SOLUTION INTRAMUSCULAR; INTRAVENOUS at 10:11

## 2022-11-02 NOTE — NURSING
WOCN consult-88 y/o female in with lt femur fx and repair 10-30-22.   Awake alert oriented up with physical therapy.    Assisted by nurse Julieta for assessment.   Sm maroon area low lt buttock inside closed cheeks noted-Moist area-  Zn oxide ordered and placed at this time.       Will follow up in a few days.

## 2022-11-02 NOTE — PLAN OF CARE
Problem: Adult Inpatient Plan of Care  Goal: Plan of Care Review  Outcome: Ongoing, Progressing  Goal: Patient-Specific Goal (Individualized)  Outcome: Ongoing, Progressing  Goal: Absence of Hospital-Acquired Illness or Injury  Outcome: Ongoing, Progressing  Goal: Optimal Comfort and Wellbeing  Outcome: Ongoing, Progressing  Goal: Readiness for Transition of Care  Outcome: Ongoing, Progressing     Problem: Skin Injury Risk Increased  Goal: Skin Health and Integrity  Outcome: Ongoing, Progressing     Problem: Fall Injury Risk  Goal: Absence of Fall and Fall-Related Injury  Outcome: Ongoing, Progressing      none

## 2022-11-02 NOTE — PLAN OF CARE
Talked with pt and her family . They tell me [facility] is their 1st choice and 2nd choice is [facility]. FOC completed  Referral sent to [facility] rehab

## 2022-11-02 NOTE — PLAN OF CARE
Problem: Occupational Therapy  Goal: Occupational Therapy Goal  Description: Goals to be met by: 12/7/22     Patient will increase functional independence with ADLs by performing:    UE Dressing with Modified [place].  LE Dressing with Modified [place].  Grooming while seated with Modified [place]. Progress to standing as appropriate.  Toileting from bedside commode with Modified [place] for hygiene and clothing management. Progress to toilet as appropriate.  Toilet transfer to bedside commode with Modified [place]. Progress to toilet as appropriate.     Outcome: Ongoing, Progressing

## 2022-11-02 NOTE — PT/OT/SLP EVAL
Occupational Therapy   Evaluation    Name: Niecy Joshua  MRN: [id number]  Admitting Diagnosis:  Closed comminuted intertrochanteric fracture of left femur  Recent Surgery: Procedure(s) (LRB):  INSERTION, INTRAMEDULLARY AMA, FEMUR (Left) 1 Day Post-Op    Recommendations:     Discharge Recommendations: rehabilitation facility  Discharge Equipment Recommendations:  walker, rolling, bedside commode, shower chair  Barriers to discharge:  None    Assessment:     Niecy Joshua is a 87 y.o. female with a medical diagnosis of Closed comminuted intertrochanteric fracture of left femur.  She presents with pain LLE and weakness. Pt is pleasant and puts forth good effort. Performance deficits affecting function: weakness, impaired self care skills, impaired functional mobility, gait instability, decreased lower extremity function, pain.      Vitals pt supine end of session - O2 96%, 100 HR, 125/67 BP    Rehab Prognosis: Good; patient would benefit from acute skilled OT services to address these deficits and reach maximum level of function.       Plan:     Patient to be seen 5 x/week, daily to address the above listed problems via self-care/home management, therapeutic activities, therapeutic exercises  Plan of Care Expires: 12/07/22  Plan of Care Reviewed with: patient, son, daughter    Subjective     Chief Complaint: Pain  Patient/Family Comments/goals: Return home.     Occupational Profile:  Living Environment: Lives alone in  home, 5 steps to enter with rails on B side, walk-in shower with built in seat and 3-4 in threshold   Previous level of function: Independent  Roles and Routines: Unstated  Equipment Used at Home: walker, rolling (Pt reports ownes RW but does not use it)  Assistance upon Discharge: Pt will have assistance from family upon DC - family members live next door     Pain/Comfort:  Pain Rating 1: 5/10  Location - Side 1: Left  Location 1: hip    Patients cultural, spiritual, Moravian conflicts given  the current situation: no    Objective:     Communicated with: otf prior to session.  Patient found up in chair with peripheral IV, PureWick upon OT entry to room.    General Precautions: Standard, fall   Orthopedic Precautions:LLE weight bearing as tolerated (ROM AT LLE)   Braces: N/A  Respiratory Status: Room air    Occupational Performance:    Bed Mobility:    Sit to sup with min assist    Functional Mobility/Transfers:  Patient completed Sit <> Stand Transfer with minimum assistance  with  rolling walker  - from chair, 1 rep  Functional Mobility: Pt ambulated from chair to EOB (~5ft) with Min A with RW - req Min cues for RW manipulation  Pt able to weight shift B LE x 4 reps and take 2 steps forward and 2 steps backward standing at chair with CGA/Min A and RW    Cognitive/Visual Perceptual:  Intact    Physical Exam:  B UE WFL    AMPAC 6 Click ADL:  AMPAC Total Score:      Treatment & Education:  Pt and family educated on POC and safety.     Patient left HOB elevated with all lines intact, call button in reach, and CNA and family present    GOALS:   Multidisciplinary Problems       Occupational Therapy Goals          Problem: Occupational Therapy    Goal Priority Disciplines Outcome Interventions   Occupational Therapy Goal     OT, PT/OT Ongoing, Progressing    Description: Goals to be met by: 12/7/22     Patient will increase functional independence with ADLs by performing:    UE Dressing with Modified [place].  LE Dressing with Modified [place].  Grooming while seated with Modified [place]. Progress to standing as appropriate.  Toileting from bedside commode with Modified [place] for hygiene and clothing management. Progress to toilet as appropriate.  Toilet transfer to bedside commode with Modified [place]. Progress to toilet as appropriate.                          History:     Past Medical History:   Diagnosis Date    Cardiac arrhythmia     Low vitamin D level     Osteoporosis      Other pulmonary embolism without acute cor pulmonale          Past Surgical History:   Procedure Laterality Date    BREAST MASS EXCISION      x3  benign    LAPAROSCOPIC REPAIR OF INGUINAL HERNIA Bilateral        Time Tracking:     OT Date of Treatment:    OT Start Time: 1110  OT Stop Time: 1140  OT Total Time (min): 30 min    Billable Minutes:Evaluation Mod Complexity - 30 min    11/2/2022

## 2022-11-02 NOTE — PROGRESS NOTES
[facility]  Orthopedics  Progress Note    Patient Name: Niecy Joshua  MRN: [id number]  Admission Date: 10/31/2022  Hospital Length of Stay: 2 days  Attending Provider: Emile Stokes MD  Primary Care Provider: King Meza MD  Follow-up For: Procedure(s) (LRB):  INSERTION, INTRAMEDULLARY AMA, FEMUR (Left)    Post-Operative Day: 1 Day Post-Op  Subjective:     Principal Problem:Closed comminuted intertrochanteric fracture of left femur    Principal Orthopedic Problem: 1 Day Post-Op   IMN left IT Femur fracture.    Interval History: Patient doing well this morning. Some tenderness as expected. Controlled with medications. Mostly complains of being uncomfortable in bed. Denies fatigue, chest pain, SOB. Was able to mobilize with therapy yesterday for stand to transfer, was not able to ambulate.     Review of patient's allergies indicates:   Allergen Reactions    Ak-mycin     Beta-blockers (beta-adrenergic blocking agts)     Zyrtec [cetirizine]        Current Facility-Administered Medications   Medication    acetaminophen tablet 650 mg    acetaminophen tablet 650 mg    dextrose 50% injection 12.5 g    dextrose 50% injection 25 g    enoxaparin injection 30 mg    glucagon (human recombinant) injection 1 mg    glucose chewable tablet 16 g    glucose chewable tablet 24 g    melatonin tablet 3 mg    methocarbamoL tablet 750 mg    morphine injection 2 mg    morphine injection 4 mg    ondansetron injection 4 mg    ondansetron injection 4 mg    oxyCODONE immediate release tablet 10 mg    oxyCODONE immediate release tablet 5 mg    senna-docusate 8.6-50 mg per tablet 2 tablet    sodium zirconium cyclosilicate packet 10 g    vitamin D 1000 units tablet 5,000 Units     Objective:     Vital Signs (Most Recent):  Temp: 98.1 °F (36.7 °C) (11/02/22 0716)  Pulse: 93 (11/02/22 0716)  Resp: 18 (11/02/22 0802)  BP: (!) 148/73 (11/02/22 0716)  SpO2: 98 % (11/02/22 0716)   Vital Signs (24h Range):  Temp:   "[97.2 °F (36.2 °C)-98.4 °F (36.9 °C)] 98.1 °F (36.7 °C)  Pulse:  [69-93] 93  Resp:  [10-21] 18  SpO2:  [92 %-100 %] 98 %  BP: ()/(52-73) 148/73     Weight: 74.8 kg (165 lb)  Height: 5' 7" (170.2 cm)  Body mass index is 25.84 kg/m².      Intake/Output Summary (Last 24 hours) at 11/2/2022 0807  Last data filed at 11/1/2022 0834  Gross per 24 hour   Intake 900 ml   Output 300 ml   Net 600 ml       Physical Exam:   Musculoskeletal:   Left lower extremity: Dressing clean and dry with minimal shadowing noted; compartments are soft and compressible; Tolerates passive range of motion of the hip and knee; appropriate tenderness to palpation; dorsi/plantar flexes the foot; SILT distally; BCR distally; DP pulse palpable      Diagnostic Findings:   Significant Labs:   Recent Lab Results         11/02/22  0413   11/01/22  0400   10/31/22  1031   10/31/22  0841        Albumin/Globulin Ratio 2.1   1.8     1.9       Albumin 3.6   3.5     3.7       Alkaline Phosphatase 61   72     82       ALT 17   19     23       aPTT     27.1  Comment: For Minimal Heparin Infusion, the goal aPTT 64-85 seconds corresponds to an anti-Xa of 0.3-0.5.    For Low Intensity and High Intensity Heparin, the goal aPTT  seconds corresponds to an anti-Xa of 0.3-0.7         AST 23   19     21       Baso #   0.03     0.06       Basophil %   0.4     0.4       BILIRUBIN TOTAL 1.5   1.6     1.1       BUN 36.4   30.7     22.4       Calcium 8.9   8.9     9.0       Chloride 107   105     109       CO2 21   23     24       Creatinine 0.97   0.89     0.70       eGFR 57   >60     >60       Eos #   0.01     0.02       Eosinophil %   0.1     0.1       Globulin, Total 1.7   1.9     2.0       Glucose 139   135     148       Group & Rh     A NEG         Hematocrit 25.1   32.3     37.6       Hemoglobin 8.1   10.5     12.2       Immature Grans (Abs) 0.04   0.03     0.11       Immature Granulocytes 0.4   0.4     0.7       Indirect Ke GEL     NEG         INR     " 1.04         Lymph #   1.93     1.07       LYMPH %   24.1     7.2       MCH 31.2   31.2     31.0       MCHC 32.3   32.5     32.4       MCV 96.5   95.8     95.7       Mono #   1.09     1.01       Mono %   13.6     6.8       MPV 11.8   11.3     11.4       Neut #   4.9     12.5       Neut %   61.4     84.8       nRBC 0.0   0.0     0.0       Platelets 165   178     212       Potassium 5.2   5.0     4.0       PROTEIN TOTAL 5.3   5.4     5.7       Protime     13.5         RBC 2.60   3.37     3.93       RDW 13.2   13.1     12.9       Sodium 137   135     140       Vit D, 25-Hydroxy 30.4             WBC 10.6   8.0     14.8                Significant Imaging: I have reviewed and interpreted all pertinent imaging results/findings.     Assessment/Plan:     Active Diagnoses:    Diagnosis Date Noted POA    PRINCIPAL PROBLEM:  Closed comminuted intertrochanteric fracture of left femur [S72.142A] 11/01/2022 Yes      Problems Resolved During this Admission:     H&H down slightly but stable. Asymptomatic this morning.   WBAT to the LLE, ROMAT. Will benefit from short rehab stay.   Daily dry dressing changes beginning tomorrow.   Lovenox for DVT ppx during stay and x 30 days upon discharge. Recommend 30 mg BID due to history of previous DVT with longstanding occlusion.   Follow up with Dr. Friend in clinic in approx 3 weeks for repeat x-rays and evaluation.   Continue to work with therapy during stay.  Stable to DC to rehab when okay with primary team.     The above findings, diagnostics, and treatment plan were discussed with Dr. Friend who is in agreement with the plan of care except as stated in additional documentation.      Brooke De Guzman PA-C  Orthopedic Trauma Surgery  [facility]

## 2022-11-02 NOTE — PHYSICIAN QUERY
PT Name: Niecy Joshua  MR #: [id number]    DOCUMENTATION CLARIFICATION     CDS/: ABHINAV Ackerman, RN               Contact information:[email or web address]    This form is a permanent document in the medical record.     Query Date: November 2, 2022    By submitting this query, we are merely seeking further clarification of documentation. Please utilize your independent clinical judgment when addressing the question(s) below.    Supporting Clinical Findings Location in Medical Record    87 y.o. female with a past medical history of DVT, osteoporosis, and vitamin-D deficiency presented to [facility] on 10/31/2022 for mechanical fall.  Patient reports she was walking to the bathroom when she fell onto her left hip. Left hip x-ray revealed comminuted left intertrochanteric femur fracture.     Past Medical History:  DVT  Osteoporosis   Vitamin-D deficiency   [facility] Medicine H&P 10/31/2022    Patient was walking to the restroom when she fell onto her left hip nonambulatory since.  She states she was crawling herself around the house trying to find help.  Pain is isolated to left hip no previous injuries in this area. Dull achy pain with muscle spasms.    Preoperative Diagnosis: Closed left intertrochanteric femur fracture    Procedure: Treatment left intertrochanteric femur fracture with intramedullary nail     Patient has severe comminution and poor bone quality.   Orthopedic Surgery Consults Note 11/01/2022          Orthopedic Surgery Op Note 11/01/2022       Provider, please clarify if there is any clinical correlation between Left intertrochanteric femur fracture and Osteoporosis.           Are the conditions:      [ x ] Due to or associated with each other   [  ] Unrelated to each other   [  ] Other explanation (Please Specify): ______________   [  ] Clinically Undetermined                                                                               Please document in your progress notes daily for the duration  of treatment until resolved and include in your discharge summary.

## 2022-11-02 NOTE — PT/OT/SLP PROGRESS
Physical Therapy Treatment    Patient Name:  Niecy Joshua   MRN:  [id number]    Recommendations:     Discharge Recommendations:  rehabilitation facility   Discharge Equipment Recommendations: walker, rolling   Barriers to discharge:  acuity of illness    Assessment:     Niecy Joshua is a 87 y.o. female admitted with a medical diagnosis of Closed comminuted intertrochanteric fracture of left femur.  She presents with the following impairments/functional limitations:  weakness, impaired endurance, impaired self care skills, impaired functional mobility, gait instability, decreased lower extremity function, decreased ROM, orthopedic precautions.    Rehab Prognosis: Good; patient would benefit from acute skilled PT services to address these deficits and reach maximum level of function.    Recent Surgery: Procedure(s) (LRB):  INSERTION, INTRAMEDULLARY AMA, FEMUR (Left) 1 Day Post-Op    Plan:     During this hospitalization, patient to be seen daily (/BID) to address the identified rehab impairments via gait training, therapeutic activities, therapeutic exercises, neuromuscular re-education and progress toward the following goals:    Plan of Care Expires:  12/02/22    Subjective     Chief Complaint: tired  Patient/Family Comments/goals: to get stronger  Pain/Comfort:  Pain Rating 1: 0/10      Objective:     Communicated with Rn prior to session.  Patient found HOB elevated with peripheral IV, PureWick upon PT entry to room.     General Precautions: Standard,     Orthopedic Precautions:LLE weight bearing as tolerated   Braces:    Respiratory Status: Room air     Functional Mobility:  Bed Mobility:     Supine to Sit: minimum assistance  Transfers:     Sit to Stand:  minimum assistance with rolling walker  Gait: pt ambulated 5 ft with a step to gt pattern with a RW and Eileen.      AM-PAC 6 CLICK MOBILITY  Turning over in bed (including adjusting bedclothes, sheets and blankets)?: 3  Sitting down on and standing up from  a chair with arms (e.g., wheelchair, bedside commode, etc.): 3  Moving from lying on back to sitting on the side of the bed?: 3  Moving to and from a bed to a chair (including a wheelchair)?: 3  Need to walk in hospital room?: 2  Climbing 3-5 steps with a railing?: 1  Basic Mobility Total Score: 15       Therapeutic Activity  Pt performed quad sets, glute sets, heel slides, hip ADD, hip ABD, ankle pumps, and LAQs all x30 on BLEs.     Patient left up in chair with all lines intact, call button in reach, RN notified, and family present..    GOALS:   Multidisciplinary Problems       Physical Therapy Goals          Problem: Physical Therapy    Goal Priority Disciplines Outcome Goal Variances Interventions   Physical Therapy Goal     PT, PT/OT Ongoing, Progressing     Description: Goals to be met by: 2022     Patient will increase functional independence with mobility by performin. Supine to sit with Stand-by Assistance  2. Sit to supine with Stand-by Assistance  3. Sit to stand transfer with Stand-by Assistance  4. Gait  x 200 feet with Stand-by Assistance using Rolling Walker.   5. Ascend/descend 5 stair with left Handrails Stand-by Assistance using Rolling Walker.                          Time Tracking:     PT Received On: 22  PT Start Time: 1005     PT Stop Time: 1030  PT Total Time (min): 25 min     Billable Minutes: Therapeutic Activity 12 minutes and Therapeutic Exercise 13 minutes    Treatment Type: Treatment  PT/PTA: PT     PTA Visit Number: 1     2022

## 2022-11-03 ENCOUNTER — HOSPITAL ENCOUNTER (INPATIENT)
Facility: HOSPITAL | Age: 87
LOS: 14 days | Discharge: HOME HEALTH | DRG: 559 | End: 2022-11-17
Attending: INTERNAL MEDICINE | Admitting: INTERNAL MEDICINE
Payer: MEDICARE

## 2022-11-03 VITALS
BODY MASS INDEX: 25.9 KG/M2 | DIASTOLIC BLOOD PRESSURE: 66 MMHG | WEIGHT: 165 LBS | OXYGEN SATURATION: 95 % | HEART RATE: 94 BPM | SYSTOLIC BLOOD PRESSURE: 108 MMHG | TEMPERATURE: 98 F | HEIGHT: 67 IN | RESPIRATION RATE: 18 BRPM

## 2022-11-03 DIAGNOSIS — S72.142A CLOSED COMMINUTED INTERTROCHANTERIC FRACTURE OF LEFT FEMUR: ICD-10-CM

## 2022-11-03 DIAGNOSIS — S72.142D CLOSED COMMINUTED INTERTROCHANTERIC FRACTURE OF LEFT FEMUR WITH ROUTINE HEALING: Primary | ICD-10-CM

## 2022-11-03 LAB
ABO + RH BLD: NORMAL
ABORH RETYPE: NORMAL
ALBUMIN SERPL-MCNC: 3.3 GM/DL (ref 3.4–4.8)
ALBUMIN/GLOB SERPL: 1.4 RATIO (ref 1.1–2)
ALP SERPL-CCNC: 62 UNIT/L (ref 40–150)
ALT SERPL-CCNC: 12 UNIT/L (ref 0–55)
AST SERPL-CCNC: 21 UNIT/L (ref 5–34)
BASOPHILS # BLD AUTO: 0.03 X10(3)/MCL (ref 0–0.2)
BASOPHILS NFR BLD AUTO: 0.3 %
BILIRUBIN DIRECT+TOT PNL SERPL-MCNC: 1.4 MG/DL
BLD PROD TYP BPU: NORMAL
BLOOD UNIT EXPIRATION DATE: NORMAL
BLOOD UNIT TYPE CODE: 600
BUN SERPL-MCNC: 50.7 MG/DL (ref 9.8–20.1)
CALCIUM SERPL-MCNC: 8.6 MG/DL (ref 8.4–10.2)
CHLORIDE SERPL-SCNC: 102 MMOL/L (ref 98–107)
CO2 SERPL-SCNC: 23 MMOL/L (ref 23–31)
CREAT SERPL-MCNC: 1.69 MG/DL (ref 0.55–1.02)
CROSSMATCH INTERPRETATION: NORMAL
DISPENSE STATUS: NORMAL
EOSINOPHIL # BLD AUTO: 0.01 X10(3)/MCL (ref 0–0.9)
EOSINOPHIL NFR BLD AUTO: 0.1 %
ERYTHROCYTE [DISTWIDTH] IN BLOOD BY AUTOMATED COUNT: 13.3 % (ref 11.5–17)
GFR SERPLBLD CREATININE-BSD FMLA CKD-EPI: 29 MLS/MIN/1.73/M2
GLOBULIN SER-MCNC: 2.3 GM/DL (ref 2.4–3.5)
GLUCOSE SERPL-MCNC: 150 MG/DL (ref 82–115)
HCT VFR BLD AUTO: 22.1 % (ref 37–47)
HGB BLD-MCNC: 7.1 GM/DL (ref 12–16)
IMM GRANULOCYTES # BLD AUTO: 0.05 X10(3)/MCL (ref 0–0.04)
IMM GRANULOCYTES NFR BLD AUTO: 0.5 %
LYMPHOCYTES # BLD AUTO: 1.66 X10(3)/MCL (ref 0.6–4.6)
LYMPHOCYTES NFR BLD AUTO: 17.3 %
MCH RBC QN AUTO: 31.1 PG (ref 27–31)
MCHC RBC AUTO-ENTMCNC: 32.1 MG/DL (ref 33–36)
MCV RBC AUTO: 96.9 FL (ref 80–94)
MONOCYTES # BLD AUTO: 1.18 X10(3)/MCL (ref 0.1–1.3)
MONOCYTES NFR BLD AUTO: 12.3 %
NEUTROPHILS # BLD AUTO: 6.6 X10(3)/MCL (ref 2.1–9.2)
NEUTROPHILS NFR BLD AUTO: 69.5 %
NRBC BLD AUTO-RTO: 0 %
PLATELET # BLD AUTO: 156 X10(3)/MCL (ref 130–400)
PMV BLD AUTO: 11.5 FL (ref 7.4–10.4)
POTASSIUM SERPL-SCNC: 4.6 MMOL/L (ref 3.5–5.1)
PROT SERPL-MCNC: 5.6 GM/DL (ref 5.8–7.6)
RBC # BLD AUTO: 2.28 X10(6)/MCL (ref 4.2–5.4)
SARS-COV-2 RDRP RESP QL NAA+PROBE: NEGATIVE
SODIUM SERPL-SCNC: 132 MMOL/L (ref 136–145)
UNIT NUMBER: NORMAL
WBC # SPEC AUTO: 9.6 X10(3)/MCL (ref 4.5–11.5)

## 2022-11-03 PROCEDURE — 94761 N-INVAS EAR/PLS OXIMETRY MLT: CPT

## 2022-11-03 PROCEDURE — 25000003 PHARM REV CODE 250: Performed by: INTERNAL MEDICINE

## 2022-11-03 PROCEDURE — 36430 TRANSFUSION BLD/BLD COMPNT: CPT

## 2022-11-03 PROCEDURE — 11800000 HC REHAB PRIVATE ROOM

## 2022-11-03 PROCEDURE — 85025 COMPLETE CBC W/AUTO DIFF WBC: CPT | Performed by: PHYSICIAN ASSISTANT

## 2022-11-03 PROCEDURE — 25000003 PHARM REV CODE 250: Performed by: NURSE PRACTITIONER

## 2022-11-03 PROCEDURE — 94799 UNLISTED PULMONARY SVC/PX: CPT

## 2022-11-03 PROCEDURE — 36415 COLL VENOUS BLD VENIPUNCTURE: CPT | Performed by: PHYSICIAN ASSISTANT

## 2022-11-03 PROCEDURE — 87635 SARS-COV-2 COVID-19 AMP PRB: CPT | Performed by: INTERNAL MEDICINE

## 2022-11-03 PROCEDURE — 97530 THERAPEUTIC ACTIVITIES: CPT

## 2022-11-03 PROCEDURE — P9016 RBC LEUKOCYTES REDUCED: HCPCS | Performed by: PHYSICIAN ASSISTANT

## 2022-11-03 PROCEDURE — 25000003 PHARM REV CODE 250: Performed by: PHYSICIAN ASSISTANT

## 2022-11-03 PROCEDURE — 97110 THERAPEUTIC EXERCISES: CPT

## 2022-11-03 PROCEDURE — 80053 COMPREHEN METABOLIC PANEL: CPT | Performed by: PHYSICIAN ASSISTANT

## 2022-11-03 PROCEDURE — 51798 US URINE CAPACITY MEASURE: CPT

## 2022-11-03 PROCEDURE — 36415 COLL VENOUS BLD VENIPUNCTURE: CPT | Performed by: INTERNAL MEDICINE

## 2022-11-03 RX ORDER — LABETALOL HCL 20 MG/4 ML
10 SYRINGE (ML) INTRAVENOUS EVERY 4 HOURS PRN
Status: DISCONTINUED | OUTPATIENT
Start: 2022-11-03 | End: 2022-11-17 | Stop reason: HOSPADM

## 2022-11-03 RX ORDER — MUPIROCIN 20 MG/G
OINTMENT TOPICAL 2 TIMES DAILY
Status: DISCONTINUED | OUTPATIENT
Start: 2022-11-03 | End: 2022-11-03 | Stop reason: HOSPADM

## 2022-11-03 RX ORDER — LACTULOSE 10 G/15ML
10 SOLUTION ORAL ONCE
Status: CANCELLED | OUTPATIENT
Start: 2022-11-03

## 2022-11-03 RX ORDER — ONDANSETRON 2 MG/ML
4 INJECTION INTRAMUSCULAR; INTRAVENOUS EVERY 6 HOURS PRN
Status: CANCELLED | OUTPATIENT
Start: 2022-11-03

## 2022-11-03 RX ORDER — HYDROXYZINE PAMOATE 50 MG/1
50 CAPSULE ORAL NIGHTLY PRN
Status: DISCONTINUED | OUTPATIENT
Start: 2022-11-03 | End: 2022-11-17 | Stop reason: HOSPADM

## 2022-11-03 RX ORDER — POLYETHYLENE GLYCOL 3350 17 G/17G
17 POWDER, FOR SOLUTION ORAL 2 TIMES DAILY PRN
Status: DISCONTINUED | OUTPATIENT
Start: 2022-11-03 | End: 2022-11-17 | Stop reason: HOSPADM

## 2022-11-03 RX ORDER — GLUCAGON 1 MG
1 KIT INJECTION
Status: CANCELLED | OUTPATIENT
Start: 2022-11-03

## 2022-11-03 RX ORDER — METHOCARBAMOL 750 MG/1
750 TABLET, FILM COATED ORAL 3 TIMES DAILY
Status: DISCONTINUED | OUTPATIENT
Start: 2022-11-03 | End: 2022-11-04

## 2022-11-03 RX ORDER — CHOLECALCIFEROL (VITAMIN D3) 25 MCG
5000 TABLET ORAL DAILY
Status: CANCELLED | OUTPATIENT
Start: 2022-11-04

## 2022-11-03 RX ORDER — ACETAMINOPHEN 325 MG/1
650 TABLET ORAL EVERY 8 HOURS PRN
Status: CANCELLED | OUTPATIENT
Start: 2022-11-03

## 2022-11-03 RX ORDER — MUPIROCIN 20 MG/G
OINTMENT TOPICAL 2 TIMES DAILY
Status: DISCONTINUED | OUTPATIENT
Start: 2022-11-03 | End: 2022-11-07

## 2022-11-03 RX ORDER — ACETAMINOPHEN 325 MG/1
650 TABLET ORAL EVERY 4 HOURS PRN
Status: DISCONTINUED | OUTPATIENT
Start: 2022-11-03 | End: 2022-11-17 | Stop reason: HOSPADM

## 2022-11-03 RX ORDER — IBUPROFEN 200 MG
16 TABLET ORAL
Status: CANCELLED | OUTPATIENT
Start: 2022-11-03

## 2022-11-03 RX ORDER — ONDANSETRON 4 MG/1
4 TABLET, ORALLY DISINTEGRATING ORAL EVERY 6 HOURS PRN
Status: DISCONTINUED | OUTPATIENT
Start: 2022-11-03 | End: 2022-11-17 | Stop reason: HOSPADM

## 2022-11-03 RX ORDER — MORPHINE SULFATE 4 MG/ML
2 INJECTION, SOLUTION INTRAMUSCULAR; INTRAVENOUS EVERY 6 HOURS PRN
Status: CANCELLED | OUTPATIENT
Start: 2022-11-03

## 2022-11-03 RX ORDER — LACTULOSE 10 G/15ML
10 SOLUTION ORAL ONCE
Status: COMPLETED | OUTPATIENT
Start: 2022-11-03 | End: 2022-11-03

## 2022-11-03 RX ORDER — PROMETHAZINE HYDROCHLORIDE 25 MG/1
25 TABLET ORAL EVERY 6 HOURS PRN
Status: DISCONTINUED | OUTPATIENT
Start: 2022-11-03 | End: 2022-11-17 | Stop reason: HOSPADM

## 2022-11-03 RX ORDER — BISACODYL 10 MG
10 SUPPOSITORY, RECTAL RECTAL DAILY PRN
Status: DISCONTINUED | OUTPATIENT
Start: 2022-11-03 | End: 2022-11-17 | Stop reason: HOSPADM

## 2022-11-03 RX ORDER — OXYCODONE HYDROCHLORIDE 5 MG/1
10 TABLET ORAL EVERY 4 HOURS PRN
Status: CANCELLED | OUTPATIENT
Start: 2022-11-03

## 2022-11-03 RX ORDER — ACETAMINOPHEN 325 MG/1
650 TABLET ORAL EVERY 4 HOURS PRN
Status: CANCELLED | OUTPATIENT
Start: 2022-11-03

## 2022-11-03 RX ORDER — DOCUSATE SODIUM 100 MG/1
100 CAPSULE, LIQUID FILLED ORAL 2 TIMES DAILY
Status: DISCONTINUED | OUTPATIENT
Start: 2022-11-03 | End: 2022-11-17 | Stop reason: HOSPADM

## 2022-11-03 RX ORDER — BENZONATATE 100 MG/1
100 CAPSULE ORAL 3 TIMES DAILY PRN
Status: DISCONTINUED | OUTPATIENT
Start: 2022-11-03 | End: 2022-11-17 | Stop reason: HOSPADM

## 2022-11-03 RX ORDER — AMOXICILLIN 250 MG
2 CAPSULE ORAL 2 TIMES DAILY
Status: CANCELLED | OUTPATIENT
Start: 2022-11-03

## 2022-11-03 RX ORDER — NITROGLYCERIN 0.4 MG/1
0.4 TABLET SUBLINGUAL EVERY 5 MIN PRN
Status: DISCONTINUED | OUTPATIENT
Start: 2022-11-03 | End: 2022-11-17 | Stop reason: HOSPADM

## 2022-11-03 RX ORDER — TALC
3 POWDER (GRAM) TOPICAL NIGHTLY
Status: CANCELLED | OUTPATIENT
Start: 2022-11-03

## 2022-11-03 RX ORDER — PANTOPRAZOLE SODIUM 40 MG/1
40 TABLET, DELAYED RELEASE ORAL DAILY
Status: CANCELLED | OUTPATIENT
Start: 2022-11-04

## 2022-11-03 RX ORDER — IBUPROFEN 200 MG
24 TABLET ORAL
Status: CANCELLED | OUTPATIENT
Start: 2022-11-03

## 2022-11-03 RX ORDER — OXYCODONE HYDROCHLORIDE 5 MG/1
5 TABLET ORAL EVERY 4 HOURS PRN
Status: DISCONTINUED | OUTPATIENT
Start: 2022-11-03 | End: 2022-11-17 | Stop reason: HOSPADM

## 2022-11-03 RX ORDER — METHOCARBAMOL 750 MG/1
750 TABLET, FILM COATED ORAL 3 TIMES DAILY
Status: CANCELLED | OUTPATIENT
Start: 2022-11-03

## 2022-11-03 RX ORDER — PANTOPRAZOLE SODIUM 40 MG/1
40 TABLET, DELAYED RELEASE ORAL DAILY
Status: DISCONTINUED | OUTPATIENT
Start: 2022-11-04 | End: 2022-11-17 | Stop reason: HOSPADM

## 2022-11-03 RX ORDER — ALPRAZOLAM 0.25 MG/1
0.25 TABLET ORAL 3 TIMES DAILY PRN
Status: DISCONTINUED | OUTPATIENT
Start: 2022-11-03 | End: 2022-11-17 | Stop reason: HOSPADM

## 2022-11-03 RX ORDER — OXYCODONE HYDROCHLORIDE 5 MG/1
5 TABLET ORAL EVERY 4 HOURS PRN
Status: CANCELLED | OUTPATIENT
Start: 2022-11-03

## 2022-11-03 RX ORDER — METOPROLOL TARTRATE 1 MG/ML
10 INJECTION, SOLUTION INTRAVENOUS
Status: DISCONTINUED | OUTPATIENT
Start: 2022-11-03 | End: 2022-11-17 | Stop reason: HOSPADM

## 2022-11-03 RX ORDER — HYDRALAZINE HYDROCHLORIDE 20 MG/ML
10 INJECTION INTRAMUSCULAR; INTRAVENOUS EVERY 4 HOURS PRN
Status: DISCONTINUED | OUTPATIENT
Start: 2022-11-03 | End: 2022-11-17 | Stop reason: HOSPADM

## 2022-11-03 RX ORDER — HYDROCODONE BITARTRATE AND ACETAMINOPHEN 500; 5 MG/1; MG/1
TABLET ORAL
Status: DISCONTINUED | OUTPATIENT
Start: 2022-11-03 | End: 2022-11-03 | Stop reason: HOSPADM

## 2022-11-03 RX ADMIN — CHOLECALCIFEROL TAB 25 MCG (1000 UNIT) 5000 UNITS: 25 TAB at 08:11

## 2022-11-03 RX ADMIN — OXYCODONE 5 MG: 5 TABLET ORAL at 02:11

## 2022-11-03 RX ADMIN — LACTULOSE 10 G: 10 SOLUTION ORAL at 11:11

## 2022-11-03 RX ADMIN — DOCUSATE SODIUM 100 MG: 100 CAPSULE, LIQUID FILLED ORAL at 09:11

## 2022-11-03 RX ADMIN — APIXABAN 5 MG: 5 TABLET, FILM COATED ORAL at 08:11

## 2022-11-03 RX ADMIN — APIXABAN 5 MG: 5 TABLET, FILM COATED ORAL at 09:11

## 2022-11-03 RX ADMIN — SENNOSIDES AND DOCUSATE SODIUM 2 TABLET: 50; 8.6 TABLET ORAL at 08:11

## 2022-11-03 RX ADMIN — METHOCARBAMOL 750 MG: 750 TABLET ORAL at 08:11

## 2022-11-03 RX ADMIN — Medication: at 10:11

## 2022-11-03 RX ADMIN — OXYCODONE 5 MG: 5 TABLET ORAL at 08:11

## 2022-11-03 RX ADMIN — PANTOPRAZOLE SODIUM 40 MG: 40 TABLET, DELAYED RELEASE ORAL at 08:11

## 2022-11-03 RX ADMIN — METHOCARBAMOL 750 MG: 750 TABLET ORAL at 09:11

## 2022-11-03 NOTE — DISCHARGE SUMMARY
[facility]  HOSPITAL MEDICINE   DISCHARGE SUMMARY    Patient Name: Niecy Joshua  MRN: [id number]  Admission Date: 10/31/2022  Hospital Length of Stay: 3 days  Discharge Date and Time: 11/03/2022  Discharge Provider: Emile Stokes  Primary Care Provider: King Meza MD      HOSPITAL COURSE   Niecy Joshua is a 87 y.o. female with a past medical history of DVT, osteoporosis, and vitamin-D deficiency presented to [facility] on 10/31/2022 for mechanical fall.  Patient reports she was walking to the bathroom when she fell onto her left hip.  Patient denies hitting head, LOC, chest pain, shortness of breath, abdominal pain, nausea, vomiting, dizziness, syncope, and fever.  Patient reports improvement of left hip pain after pain medications given in ED.  Initial vital signs in the ED were /68, pulse 70, respirations 16, temperature 36.8° C, and SpO2 97%.  Labs revealed WBC 14.8, chloride 109, BUN 22.4, and glucose 148.  Chest x-ray revealed no acute pulmonary process.  Left hip x-ray revealed comminuted left intertrochanteric femur fracture.  Orthopedics was consulted with plans for surgery tomorrow. Patient was admitted to hospital medicine service for further medical management.   November 1st she underwent left intertrochanteric femur fracture intramedullary nailing with Dr. Friend.  Postoperatively she had some nausea and vomiting which was controlled with Reglan and may have been secondary to anesthesia effect.  This morning she is doing much better from that standpoint however her hemoglobin is slightly on the low normal and with a hemoglobin of 7.1 and we have ordered a unit of blood transfusion.  Also she had some complaints of abdominal bloating and constipation.  Today he renal functions with a creatinine of 1.69 Sharon a bladder scan reveals high volume and Mccarty catheter was inserted with removal of 1475 mL of urine.        PHYSICAL EXAM     Most Recent Vital Signs:  Temp:  98.2 °F (36.8 °C) (11/03/22 1149)  Pulse: 87 (11/03/22 1149)  Resp: 19 (11/03/22 1149)  BP: 116/66 (11/03/22 1149)  SpO2: (!) 92 % (11/03/22 1149)     GENERAL: In no acute distress, afebrile  HEENT:  CHEST: Clear to auscultation bilaterally  HEART: S1, S2, no appreciable murmur  ABDOMEN: Soft, nontender, BS +  MSK: Warm, no lower extremity edema, no clubbing or cyanosis  NEUROLOGIC: Alert and oriented x4, moving all extremities with good strength   INTEGUMENTARY:  PSYCHIATRY:          DISCHARGE DIAGNOSIS   Left comminuted intertrochanteric femur fracture-status post intramedullary nailing November 1  Postobstructive acute kidney injury   Acute urinary retention  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  Up in 3 weeks.  Resume Eliquis home dose for long-term DVT prevention, cleared by Orthopedic surgery after I spoke to them.  Diagnosed with DVT couple years ago.  Discontinue scheduled Reglan, nausea resolved.    insert Mccarty catheter temporarily plan to remove in the next couple of days after bladder rest.  Had 1475 mL in the bladder upon insertion.  Discontinue methocarbamol  Transfuse 1 unit of blood today  transfer to rehab facility     DVT prophylaxis:  Eliquis  _____________________________________________________________________________      DISCHARGE MED REC     Current Discharge Medication List        CONTINUE these medications which have NOT CHANGED    Details   ELIQUIS 5 mg Tab TAKE ONE TABLET BY MOUTH TWICE DAILY  Qty: 60 tablet, Refills: 1    Comments: This prescription was filled on 9/14/2022. Any refills authorized will be placed on file.      alendronate (FOSAMAX) 70 MG tablet TAKE ONE TABLET BY MOUTH WEEKLY 30 MINUTES BEFORE FIRST FOOD AND DO NOT LIE DOWN FOR AT LEAST 30 MINUTES  Qty: 4 tablet, Refills: 11      calcium carbonate (OS-LEE) 500 mg calcium (1,250 mg) tablet Take 1 tablet by mouth 2 (two) times a day.                 CONSULTS     Consults (From admission, onward)          Status Ordering Provider     Inpatient consult to Registered Dietitian/Nutritionist  Once        Provider:  (Not yet assigned)    Acknowledged DESMOND BASSETT     Inpatient consult to Social Work/Case Management  Once        Provider:  (Not yet assigned)    Acknowledged ORVILLE MELÉNDEZ              FOLLOW UP      Follow-up Information       Florentino Friend DO. Go on 11/28/2022.    Specialty: Orthopedic Surgery  Why: For suture removal, For wound re-check    11/28/2022 @10:15am  Contact information:  [address] [place] [address] [address] [place] [id number]  [phone]                                 DISCHARGE INSTRUCTIONS     Explained in detail to the patient about the discharge plan, medications, and follow-up visits. Pt understands and agrees with the treatment plan.  Discharged Condition: stable  Diet as tolerated  Activities as tolerated  Discharge to:  Rehab     TIME SPENT ON DISCHARGE   35 minutes        Desmond Deras M.D, on 11/3/2022  Internal Medicine  Department of [facility] Medicine    This document was created using electronic dictation services.  Please excuse any errors that may have been made.  Contact me if any questions regarding documentation to clarify verbiage.

## 2022-11-03 NOTE — PT/OT/SLP PROGRESS
Physical Therapy Treatment    Patient Name:  Niecy Joshua   MRN:  [id number]    Recommendations:     Discharge Recommendations:  rehabilitation facility   Discharge Equipment Recommendations: walker, rolling   Barriers to discharge:  acuity of illness    Assessment:     Niecy Joshua is a 87 y.o. female admitted with a medical diagnosis of Closed comminuted intertrochanteric fracture of left femur.  She presents with the following impairments/functional limitations:  weakness, impaired endurance, impaired self care skills, impaired functional mobility, gait instability, impaired balance, pain, decreased lower extremity function, orthopedic precautions, decreased ROM.    Rehab Prognosis: Good; patient would benefit from acute skilled PT services to address these deficits and reach maximum level of function.    Recent Surgery: Procedure(s) (LRB):  INSERTION, INTRAMEDULLARY AMA, FEMUR (Left) 2 Days Post-Op    Plan:     During this hospitalization, patient to be seen daily (/BID) to address the identified rehab impairments via gait training, therapeutic activities, therapeutic exercises, neuromuscular re-education and progress toward the following goals:    Plan of Care Expires:  12/03/22    Subjective     Chief Complaint: pain  Patient/Family Comments/goals: to get stronger  Pain/Comfort:  Pain Rating 1: 5/10  Location - Side 1: Left  Location 1: hip      Objective:     Communicated with RN prior to session.  Patient found HOB elevated with peripheral IV, PureWick upon PT entry to room.     General Precautions: Standard, fall   Orthopedic Precautions:LLE weight bearing as tolerated (LLE ROMAT)   Braces:    Respiratory Status: Room air     BP: 116/71 mmHg    Functional Mobility:  Bed Mobility:     Supine to Sit: minimum assistance  Transfers:     Sit to Stand:  minimum assistance with rolling walker  Gait: pt ambulated 5 ft with a slow step to gt pattern with a RW and Eileen. OOB mobility limited secondary to pt  fatigue.      AM-PAC 6 CLICK MOBILITY  Turning over in bed (including adjusting bedclothes, sheets and blankets)?: 3  Sitting down on and standing up from a chair with arms (e.g., wheelchair, bedside commode, etc.): 3  Moving from lying on back to sitting on the side of the bed?: 3  Moving to and from a bed to a chair (including a wheelchair)?: 3  Need to walk in hospital room?: 2  Climbing 3-5 steps with a railing?: 1  Basic Mobility Total Score: 15       Therapeutic Exercise:  Pt performed LAQ's, quad sets, glute sets, and hip ABD all x15 on BLE.    Patient left up in chair with all lines intact, call button in reach, RN notified, and daughter present..    GOALS:   Multidisciplinary Problems       Physical Therapy Goals          Problem: Physical Therapy    Goal Priority Disciplines Outcome Goal Variances Interventions   Physical Therapy Goal     PT, PT/OT Ongoing, Progressing     Description: Goals to be met by: 2022     Patient will increase functional independence with mobility by performin. Supine to sit with Stand-by Assistance  2. Sit to supine with Stand-by Assistance  3. Sit to stand transfer with Stand-by Assistance  4. Gait  x 200 feet with Stand-by Assistance using Rolling Walker.   5. Ascend/descend 5 stair with left Handrails Stand-by Assistance using Rolling Walker.                          Time Tracking:     PT Received On: 22  PT Start Time: 1007     PT Stop Time: 1030  PT Total Time (min): 23 min     Billable Minutes: Therapeutic Activity 12 minutes and Therapeutic Exercise 11 minutes    Treatment Type: Treatment  PT/PTA: PT     PTA Visit Number: 2     2022

## 2022-11-03 NOTE — CONSULTS
[facility] - Rehab Inpatient Services  Inpatient Rehab Prescreen    PATIENT INFORMATION     Assessment date/time: 11/03/2022 10:11 AM    Name: Niecy Joshua Phone: [phone]   Address:   [place] [address] SSN:    YOB: 1935 Age: 87 y.o. Gender: female   Race: White   Marital Status:    Advance Directives: Full code     COVERAGE INFORMATION     Patient Medicare #:  [id number]    Primary Insurance Type:  / Medicare Part A&B Secondary Insurance Type:  / [employer] of [place]   Policy #:  [id number] Policy #:  [id number]   Insurance contact name/number:  Insurance contact name/number:    Authorization #:  Authorization #:    Verified Coverage: Insurance Carrier   Pending Coverage:    Prescription Coverage:  Insurance details/comments:      PHYSICIAN/REFERRAL INFORMATION     Primary Care Physician: King Meza MD Attending Physician: Cb Fall MD   Consulting physician/specialist:  Referring Physician:    [place] [facility] [place] name/phone:    Physician details/comments:      CONTACT INFORMATION   Extended Emergency Contact Information  Primary Emergency Contact: Marya Cisneros  Mobile Phone: [phone]  Relation: Daughter  Preferred language: English   needed? No    PRIOR LIVING SITUATION         Bedroom location/setup: 1st floor   Bathroom location/setup: 1st floor   Equipment at home: RW, HHS, cane   Prior device use:  none     PRIOR LEVEL OF FUNCTION     Did a helper need to assist with the following activities prior to the current illness, exacerbation, or injury?   Self-care:  No, independent   Indoor mobility:  No, independent   Stairs: No, independent   Functional Cognition: No, independent   Comments:    Caregivers providing assistance:   Pre-hospital home care service:  Name of Agency:    Home/personal responsibilities: pt. Was completely independent with ADLs and mobility.  She was still  driving and cutting her grass   Pre-hospital vocational category: Retired for age   Pre-hospital vocational effort: Retired for age   Occupation/profession:   Retired   Return to work/school plan:    Educational history: completed high school    Hobbies/leisure activities:  Resources used prior to admission:    Available resources:  Resource information comments:      REHABILITATION DIAGNOSIS     History of present illness: Niecy Joshua is a 87 y.o. female with a past medical history of DVT, osteoporosis, and vitamin-D deficiency presented to [facility] on 10/31/2022 for mechanical fall.  Patient reports she was walking to the bathroom when she fell onto her left hip.  Patient denies hitting head, LOC, chest pain, shortness of breath, abdominal pain, nausea, vomiting, dizziness, syncope, and fever.  Patient reports improvement of left hip pain after pain medications given in ED.  Initial vital signs in the ED were /68, pulse 70, respirations 16, temperature 36.8° C, and SpO2 97%.  Labs revealed WBC 14.8, chloride 109, BUN 22.4, and glucose 148.  Chest x-ray revealed no acute pulmonary process.  Left hip x-ray revealed comminuted left intertrochanteric femur fracture.  Orthopedics was consulted with plans for surgery. Patient was admitted to hospital medicine service for further medical management.   November 1st she underwent left intertrochanteric femur fracture intramedullary nailing with Dr. Friend.Pt. is participating with therapy.  She will benefit from an inpatient rehab stay.  This will help to increase pt's functional independence with ADLs and mobility prior to return home.   Pt. Requires acute inpatient rehab admission with 24-hour nursing and active physician oversight to monitor and manage acute medical comorbid conditions, labs, pain, and functional deficits.  Patient/family will also require teaching and integration of improving functional skills into daily living.  She will also  require individualized, interdisciplinary approach to her care, receiving PT,OT services 3 hours per day, 5 days per week.    Required care cannot be provided at lower level of care. Patient is anticipated to require approximately 10-12 days LOS with expected discharge home with HH.   Impairment group (IGC):   Unilateral Hip Fracture 08.11 Etiologic diagnosis/description: closed left intertrochanteric femur fracture   Date of onset:  (Not on file)10/31/2022 Date of surgery: 11/1/2022 IM nailing   Allergies: Ak-mycin, Beta-blockers (beta-adrenergic blocking agts), and Zyrtec [cetirizine]   Comorbid condition: Osteoporosis   Medical/functional conditions requiring inpatient rehabilitation: This patient requires medical management/24-hour nursing?of complex    comorbidities ( osteoporosis, vitamin D deficiency), labs,medications (see medications list), pain, incision care,sleep  hygiene, anticoagulation, nutrition, hydration, neurological,  pulmonary, and cardiac status, and preventive healthcare.          This patient requires intense therapy and an integrated,    interdisciplinary approach to address safety, impaired mobility,    impaired ADLs (dressing, toileting, grooming, showering),   pulmonary insufficiency, preventive healthcare, medication management, integration of  improving functional skills into daily living, and home caregiver    support and training.    Risk for medical/clinical complications: This patient is at risk for the following complications: DVT/PE, pneumonia,  malnutrition, neurological decline, respiratory insufficiency, worsening activity intolerance, complications from anticoagulation,    Incision infection, skin breakdown, inadequate  sleep,  and constipation.      SPECIAL REHABILITATION NEEDS     IV: 20 gauge right forearm saline lock Preferred Language: English        Peripheral IV - Single Lumen 10/31/22 20 G Right Forearm (Active)   Site Assessment Clean;Dry;Intact 11/03/22 0800  "  Extremity Assessment Distal to IV No abnormal discoloration;No redness;No warmth;No swelling 11/03/22 0800   Line Status Capped;Saline locked 11/03/22 0800   Dressing Status Clean;Dry;Intact 11/03/22 0800   Dressing Intervention Integrity maintained 11/03/22 0800          PRECAUTIONS     Hip precautions, Safety/fall precautions: High fall risk, and Weight-bearing status: Weight-bearing as tolerated: LLE ROMAT     PAST MEDICAL, SOCIAL, FAMILY HISTORY     Pertinent past medical history:   Past Medical History:   Diagnosis Date    Cardiac arrhythmia     Low vitamin D level     Osteoporosis     Other pulmonary embolism without acute cor pulmonale       Has the patient had two or more falls in the past year or any fall with injury in the past year: YES   Has the patient had major surgery during the 100 days prior to admission: YES   Family Medical History:   Family History   Problem Relation Age of Onset    Clotting disorder Mother     No Known Problems Father     No Known Problems Sister       Substance use history:   Social History     Substance and Sexual Activity   Alcohol Use Never     Social History     Tobacco Use   Smoking Status Never   Smokeless Tobacco Never     Social History     Substance and Sexual Activity   Drug Use Never      VITALS   BP:  114/73     Temp: 98 °F (36.7 °C)     HR: 102     Resp: 18     SpO2: 95 %     Height: 5' 7" (1.702 m)     Weight: 74.8 kg (165 lb)     BMI: 25.8          MED/LABS/DIAGNOSITICS   No current facility-administered medications for this encounter.     No current outpatient medications on file.     Facility-Administered Medications Ordered in Other Encounters   Medication Dose Route Frequency Provider Last Rate Last Admin    0.9%  NaCl infusion (for blood administration)   Intravenous Q24H PRN Brooke De Guzman PA-C        acetaminophen tablet 650 mg  650 mg Oral Q8H PRN Pramod Benitez PA-C        acetaminophen tablet 650 mg  650 mg Oral Q4H PRN Pramod Benitez PA-C     "    apixaban tablet 5 mg  5 mg Oral BID Emile Stokes MD   5 mg at 11/03/22 0834    dextrose 50% injection 12.5 g  12.5 g Intravenous PRN Pramod Benitez PA-C        dextrose 50% injection 25 g  25 g Intravenous PRN Pramod Benitez PA-C        glucagon (human recombinant) injection 1 mg  1 mg Intramuscular PRN Pramod Benitez PA-C        glucose chewable tablet 16 g  16 g Oral PRN Pramod Benitez PA-C        glucose chewable tablet 24 g  24 g Oral PRN Pramod Benitez PA-C        lactulose 10 gram/15 ml solution 10 g  10 g Oral Once Emile Stokes MD        melatonin tablet 3 mg  3 mg Oral Nightly Emile Stokes MD   3 mg at 10/31/22 2041    methocarbamoL tablet 750 mg  750 mg Oral TID Brooke De Guzman PA-C   750 mg at 11/03/22 0834    morphine injection 2 mg  2 mg Intravenous Q6H PRN Emile Stokes MD   2 mg at 11/01/22 0000    morphine injection 4 mg  4 mg Intravenous Q6H PRN Brooke De Guzman PA-C        ondansetron injection 4 mg  4 mg Intravenous Q8H PRN Pramod Benitez PA-C        ondansetron injection 4 mg  4 mg Intravenous Q6H PRN Brooke De Guzman PA-C   4 mg at 11/02/22 0911    oxyCODONE immediate release tablet 10 mg  10 mg Oral Q4H PRN Brooke De Guzman PA-C   10 mg at 11/01/22 1337    oxyCODONE immediate release tablet 5 mg  5 mg Oral Q4H PRN LLUVIA Meier   5 mg at 11/03/22 0834    pantoprazole EC tablet 40 mg  40 mg Oral Daily Emile Stokes MD   40 mg at 11/03/22 0834    senna-docusate 8.6-50 mg per tablet 2 tablet  2 tablet Oral BID Emile Stokes MD   2 tablet at 11/03/22 0834    vitamin D 1000 units tablet 5,000 Units  5,000 Units Oral Daily Emile Stokes MD   5,000 Units at 11/03/22 0834    zinc oxide-cod liver oil 40 % paste   Topical (Top) BID Emile Stokes MD   Given at 11/03/22 1005      Pertinent lab results:   Lab Results   Component Value Date    WBC 9.6 11/03/2022    HGB 7.1 (L) 11/03/2022    HCT 22.1 (L) 11/03/2022     11/03/2022     (L) 11/03/2022    K 4.6 11/03/2022     BUN 50.7 (H) 11/03/2022    CO2 23 11/03/2022      Pertinent diagnostic studies:    Additional labs and diagnostic studies required prior to admission:      QI: GG Care Tool     QI: GG Care Tool  Therapy Evaluation   Swallowing/  Nutritional Status   Diet/Feeding/  Swallowing    Eating       Oral Hygiene   Grooming    Toileting Hygiene       Shower/Bathe   Bathing    Upper Body Dressing   Dressing Upper    Lower Body Dressing   Dressing Lower    Putting On/Taking Off Footwear       Toilet Transfer   Toileting    Bladder Continence Status   Bladder     Bowel Continence Status   Bowel     Roll Left and Right   Bed Mobility Supine to Sit: minimum assistance   Sit to Lying       Lying to Sitting on Side of Bed       Sit to Stand       Chair/Bed-to- Chair   Transfers   Sit to Stand:  minimum assistance with rolling walker   Car Transfer       Walk 10 Feet   Equipment RW     Walk 50 Feet with Two Turns   Balance    Walk 150 Feet   Endurance    Walk 10 Feet on Uneven Surfaces   Gait Gait: pt ambulated 5 ft with a step to gt pattern with a RW and Eileen.      Picking up an Object       Wheel 50 Feet with Two Turns   Wheelchair    Wheel 150 Feet       1 Step (Curb)   Stairs    4 Steps       12 Steps       Expression of Ideas and Wants   Communication    Understanding  Verbal and Non-Verbal Content       Cognitive Patterns   Cognition       Safety Precautions       Lower Extremity      Strength RLE Strength: WFL      ROM RLE ROM: WFL      Upper Extremity      Strength       ROM      Care Score Value Definitions  6: Independent. [place] provides no assistance with tasks. A device may or may not have been used.  5: Set-up or clean-up assistance. [place] sets up or cleans up, but does not assist with tasks. [place] may have assisted prior to or following the activity.  4: Supervision or touching assistance. [place] provides verbal cues or touching/steadying or contact guard assistance. Assistance may be provided throughout the activity  or intermittently.  3: Partial/moderate assistance. [place] does less than half the effort. [place] lifts, holds, or supports trunk or limbs, but provides less than half the effort.  2: Substantial/maximal assistance. [place] does more than half the effort. [place] lifts or holds trunk or limbs, and provides more than half the effort.  1: Dependent. [place] does all of the effort, or the assistance of two or more helpers is required for the patient to complete the activity.  Care Score Activity Not Attempted Value Definitions  7: Patient refused.  9: Not applicable. Not attempted and the patient did not perform this activity prior to the current illness, exacerbation, or injury.  10: Not attempted due to environmental limitations (e.g., lack of equipment, weather constraints).  88: Not attempted due to medical condition or safety concerns.    DISCHARGE GOALS/ANTICPATED INTERVENTIONS/SERVICES     Expected Level of Improvement for Safe Discharge: Patient/caregivers anticipate discharge home at or near baseline level of functioning and/or decreased burden of care.         Patient/Family/Caregiver Goals: To get back to moving around house like she was   Required Treatments/Services: Rehabilitation Nursing, Dietitian/nutrition, Social , and Case Management   Required Therapy Therapy Type Min/Day Days/Week Duration of Therapy   Physical Therapy 90 5 10-12 days   Occupational Therapy 90 5 10-12 days   Speech and Language Pathology      Prosthetic/Orthotics      Recreational Therapy      Anticipated Services upon Discharge:  Home Health vs outpatient PT/OT   Additional rehabilitation needs:  N/A   Expected Discharge Destination:  Home with    Barriers to Discharge: None   Discharge Support: Patient has a caregiver available, Discharge plan has been verified with patient's caregiver, and Caregiver is in agreement with the discharge plan   Patient/Family/Caregiver Orientation: Patient/family/caregiver oriented and agreeable to  inpatient rehabilitation plan   Estimated Length of Stay: 10-12 days   Projected Admission Date: 11/03/2022   Medical Prognosis: good   Physicians Review and Admission Determination:   I have discussed patient with Nurse Liaison. I have reviewed the prescreen. Patient is in need of, appropriate for and should tolerate and benefit from an aggressive IRF stay.

## 2022-11-03 NOTE — PROGRESS NOTES
[facility]  Orthopedics  Progress Note    Patient Name: Niecy Joshua  MRN: [id number]  Admission Date: 10/31/2022  Hospital Length of Stay: 3 days  Attending Provider: Emile Stokes MD  Primary Care Provider: King Meza MD  Follow-up For: Procedure(s) (LRB):  INSERTION, INTRAMEDULLARY AMA, FEMUR (Left)    Post-Operative Day: 2 Day Post-Op  Subjective:     Principal Problem:Closed comminuted intertrochanteric fracture of left femur    Principal Orthopedic Problem: 2 Days Post-Op   IMN left IT Femur fracture.    Interval History: Patient doing well this morning. Some tenderness as expected. Controlled with medications. States she was comfortable through the night and able to sleep well. Denies fatigue, chest pain, SOB. Continues to mobilize with therapy yesterday. Hgb down this morning.     Review of patient's allergies indicates:   Allergen Reactions    Ak-mycin     Beta-blockers (beta-adrenergic blocking agts)     Zyrtec [cetirizine]        Current Facility-Administered Medications   Medication    0.9%  NaCl infusion (for blood administration)    acetaminophen tablet 650 mg    acetaminophen tablet 650 mg    apixaban tablet 5 mg    dextrose 50% injection 12.5 g    dextrose 50% injection 25 g    glucagon (human recombinant) injection 1 mg    glucose chewable tablet 16 g    glucose chewable tablet 24 g    melatonin tablet 3 mg    methocarbamoL tablet 750 mg    metoclopramide HCl injection 5 mg    morphine injection 2 mg    morphine injection 4 mg    ondansetron injection 4 mg    ondansetron injection 4 mg    oxyCODONE immediate release tablet 10 mg    oxyCODONE immediate release tablet 5 mg    pantoprazole EC tablet 40 mg    senna-docusate 8.6-50 mg per tablet 2 tablet    vitamin D 1000 units tablet 5,000 Units    zinc oxide-cod liver oil 40 % paste     Objective:     Vital Signs (Most Recent):  Temp: 98.1 °F (36.7 °C) (11/03/22 0804)  Pulse: 103 (11/03/22 0804)  Resp: 18  "(11/03/22 0804)  BP: 128/71 (11/03/22 0804)  SpO2: (!) 92 % (11/03/22 0804)   Vital Signs (24h Range):  Temp:  [97.2 °F (36.2 °C)-98.4 °F (36.9 °C)] 98.1 °F (36.7 °C)  Pulse:  [] 103  Resp:  [17-18] 18  SpO2:  [92 %-98 %] 92 %  BP: ()/(58-76) 128/71     Weight: 74.8 kg (165 lb)  Height: 5' 7" (170.2 cm)  Body mass index is 25.84 kg/m².      Intake/Output Summary (Last 24 hours) at 11/3/2022 0825  Last data filed at 11/2/2022 1300  Gross per 24 hour   Intake 100 ml   Output 700 ml   Net -600 ml         Physical Exam:   Musculoskeletal:   Left lower extremity: Dressing clean and dry with minimal shadowing noted; incisions dry with no active bleeding; compartments are soft and compressible; Tolerates passive range of motion of the hip and knee; appropriate tenderness to palpation; dorsi/plantar flexes the foot; SILT distally; BCR distally; DP pulse palpable      Diagnostic Findings:   Significant Labs:   Recent Lab Results  (Last 5 results in the past 72 hours)        11/03/22  0457   11/02/22  0413   11/01/22  0400   10/31/22  1031   10/31/22  0841        Albumin/Globulin Ratio 1.4   2.1   1.8     1.9       Albumin 3.3   3.6   3.5     3.7       Alkaline Phosphatase 62   61   72     82       ALT 12   17   19     23       aPTT       27.1  Comment: For Minimal Heparin Infusion, the goal aPTT 64-85 seconds corresponds to an anti-Xa of 0.3-0.5.    For Low Intensity and High Intensity Heparin, the goal aPTT  seconds corresponds to an anti-Xa of 0.3-0.7         AST 21   23   19     21       Baso # 0.03     0.03     0.06       Basophil % 0.3     0.4     0.4       BILIRUBIN TOTAL 1.4   1.5   1.6     1.1       BUN 50.7   36.4   30.7     22.4       Calcium 8.6   8.9   8.9     9.0       Chloride 102   107   105     109       CO2 23   21   23     24       Creatinine 1.69   0.97   0.89     0.70       eGFR 29   57   >60     >60       Eos # 0.01     0.01     0.02       Eosinophil % 0.1     0.1     0.1       " Globulin, Total 2.3   1.7   1.9     2.0       Glucose 150   139   135     148       Gran # (ANC)   9.116             Group & Rh       A NEG         Hematocrit 22.1   25.1   32.3     37.6       Hemoglobin 7.1   8.1   10.5     12.2       Immature Grans (Abs) 0.05   0.04   0.03     0.11       Immature Granulocytes 0.5   0.4   0.4     0.7       Indirect Ke GEL       NEG         INR       1.04         Instr WBC   10.6             Lymph # 1.66   0.954   1.93     1.07       LYMPH % 17.3     24.1     7.2       Lymph Man   9             MCH 31.1   31.2   31.2     31.0       MCHC 32.1   32.3   32.5     32.4       MCV 96.9   96.5   95.8     95.7       Mono # 1.18   0.53   1.09     1.01       Mono % 12.3   5   13.6     6.8       MPV 11.5   11.8   11.3     11.4       Neut # 6.6     4.9     12.5       Neut % 69.5     61.4     84.8       Neutrophils Relative   86             nRBC 0.0   0.0   0.0     0.0       Platelet Estimate   Normal             Platelets 156   165   178     212       Poikilocytosis   1+             Potassium 4.6   5.2   5.0     4.0       PROTEIN TOTAL 5.6   5.3   5.4     5.7       Protime       13.5         RBC 2.28   2.60   3.37     3.93       RBC Morph   Abnormal             RDW 13.3   13.2   13.1     12.9       Sodium 132   137   135     140       Stomatocytes   1+             Vit D, 25-Hydroxy   30.4             WBC 9.6   10.6   8.0     14.8                               Significant Imaging: I have reviewed and interpreted all pertinent imaging results/findings.     Assessment/Plan:     Active Diagnoses:    Diagnosis Date Noted POA    PRINCIPAL PROBLEM:  Closed comminuted intertrochanteric fracture of left femur [S72.142A] 11/01/2022 Yes      Problems Resolved During this Admission:     Hgb 7.1 this morning. Will transfuse 1 unit. Discussed with Dr. Stokes in room this morning.   WBAT to the LLE, ROMAT. Will benefit from short rehab stay.   Daily dry dressing changes to the left thigh and hip  Okay to  transition to treatment dose of Eliquis for DVT ppx.  Follow up with Dr. Friend in clinic in approx 3 weeks for repeat x-rays and evaluation.   Continue to work with therapy during stay.  Stable to DC to rehab pending transfusion today.       The above findings, diagnostics, and treatment plan were discussed with Dr. Friend who is in agreement with the plan of care except as stated in additional documentation.      Brooke De Guzman PA-C  Orthopedic Trauma Surgery  [facility]

## 2022-11-03 NOTE — H&P
[facility] ([facility])  Rehab Admission H&P    Patient Name: Niecy Joshua  MRN: [id number]  Age: 87 y.o. Sex: female  : 1935  Hospital Length of Stay: 1 days  Date of Service: 2022   Chief Complaint:  Left hip fracture s/p IMN on 2022    Subjective:   History of Present Illness   87-year-old white female presented to [facility] ED on 10/31/2022 complaining of left hip pain after falling from ground level.  PMH significant for unprovoked DVT diagnosed in  and osteoporosis.  Work up significant for hip fracture.  Tolerated left IMN on  without perioperative complications.  Orthopedic surgery recommended WBAT to RLE.  H&H continues to trend downward with hemoglobin 7.1 requiring 1 unit PRBC transfusion.  Tolerated transfer to [facility] inpatient rehab unit on  without incident.  Sitting up in chair.  Reports good sleep and appetite.  Last BM .  Vital signs at goal with no recorded fevers.  H&H 7.0/22.0-trending down s/p 1 unit PRBC on .  Iron level 23.  BUN trending down.  CMP otherwise at goal.  Albumin 2.7.  Prealbumin 9.7.  Recent imaging reported below.    Past Medical History: Left hip fracture s/p left hip IMN on 2022, unprovoked left lower extremity DVT on Eliquis 5 mg b.i.d., normocytic anemia, GERD, vitamin-D deficiency, osteoporosis  Procedure history:  Left hip IMN 2022, bilateral hernia repair, tonsillectomy  Family History:  Mother: HTN + at age 85.  Father:  Unknown medical problems + at age 82.  Social History:   (-) TOB.    (-) ETOH.   (-) Illicit drug use.   Completed high school. No  history. Reetired . . She lives alone and was completely independent. Pts son Lukas lives behind her. He states that they have good family support and will be able to assist her after rehab.  Children (4) Friends/Family: 1)   Prior level of function: Independent ADLs, drives, cooks, does chores,  does laundry, grocery shops, manages finances, manages own meds, and does yard work.  Residence:  Lives alone in the [place] in a single-story home with 4-5 steps and bilateral rails to enter the residence. She uses a walk-in shower with a threshold and HHS. No grab bars. She has a small built-in seat in the shower. She does not have an elevated toilet. No grab bars.    DME: RW, HHS, cane. Pt. Will use [employer] for DME.   Anticipated discharge destination:  Home with home health    Review of patient's allergies indicates:   Allergen Reactions    Ak-mycin     Beta-blockers (beta-adrenergic blocking agts)     Zyrtec [cetirizine]       No current facility-administered medications for this encounter.  No current outpatient medications on file.    Facility-Administered Medications Ordered in Other Encounters:     0.9%  NaCl infusion (for blood administration), , Intravenous, Q24H PRN, Brooke De Guzman PA-C    acetaminophen tablet 650 mg, 650 mg, Oral, Q8H PRN, Pramod Benitez PA-C    acetaminophen tablet 650 mg, 650 mg, Oral, Q4H PRN, Pramod Benitez PA-C    apixaban tablet 5 mg, 5 mg, Oral, BID, Emile Stokes MD, 5 mg at 11/03/22 0834    dextrose 50% injection 12.5 g, 12.5 g, Intravenous, PRN, Pramod Benitez PA-C    dextrose 50% injection 25 g, 25 g, Intravenous, PRN, Pramod Benitez PA-C    glucagon (human recombinant) injection 1 mg, 1 mg, Intramuscular, PRN, Pramod Benitez PA-C    glucose chewable tablet 16 g, 16 g, Oral, PRN, Pramod Benitez PA-C    glucose chewable tablet 24 g, 24 g, Oral, PRN, Pramod Benitez PA-C    melatonin tablet 3 mg, 3 mg, Oral, Nightly, Emile Stokes MD, 3 mg at 10/31/22 2041    morphine injection 2 mg, 2 mg, Intravenous, Q6H PRN, Emile Stokes MD, 2 mg at 11/01/22 0000    morphine injection 4 mg, 4 mg, Intravenous, Q6H PRN, Brooke De Guzman PA-C    mupirocin 2 % ointment, , Nasal, BID, Emile Stokes MD    ondansetron injection 4 mg, 4 mg, Intravenous, Q8H PRN, Pramod Benitez,  PA-SANJAY    ondansetron injection 4 mg, 4 mg, Intravenous, Q6H PRN, Brooke De Guzman PA-C, 4 mg at 11/02/22 0911    oxyCODONE immediate release tablet 10 mg, 10 mg, Oral, Q4H PRN, Brooke De Guzman PA-C, 10 mg at 11/01/22 1337    oxyCODONE immediate release tablet 5 mg, 5 mg, Oral, Q4H PRN, LLUVIA Meier, 5 mg at 11/03/22 1436    pantoprazole EC tablet 40 mg, 40 mg, Oral, Daily, Emile Stokes MD, 40 mg at 11/03/22 0834    senna-docusate 8.6-50 mg per tablet 2 tablet, 2 tablet, Oral, BID, Emile Stokes MD, 2 tablet at 11/03/22 0834    vitamin D 1000 units tablet 5,000 Units, 5,000 Units, Oral, Daily, Emile Stokes MD, 5,000 Units at 11/03/22 0834    zinc oxide-cod liver oil 40 % paste, , Topical (Top), BID, Emile Stokes MD, Given at 11/03/22 1005     Review of Systems   Complete 12-point review of symptoms negative except for what's mentioned in HPI     Objective:     /66   Pulse 75   Temp 98.7 °F (37.1 °C) (Oral)   Resp 14   SpO2 97%       Intake/Output Summary (Last 24 hours) at 11/4/2022 1347  Last data filed at 11/4/2022 0732  Gross per 24 hour   Intake 360 ml   Output 1350 ml   Net -990 ml       Physical Exam  Constitutional:       Appearance: Normal appearance.   HENT:      Head: Normocephalic.      Mouth/Throat:      Mouth: Mucous membranes are moist.   Eyes:      Pupils: Pupils are equal, round, and reactive to light.   Cardiovascular:      Rate and Rhythm: Normal rate and regular rhythm.      Heart sounds: Normal heart sounds.      Comments: Regular rate with intermittent pauses.  LLE 1+ edema  Pulmonary:      Effort: Pulmonary effort is normal.      Breath sounds: Normal breath sounds.   Abdominal:      General: Bowel sounds are normal.      Palpations: Abdomen is soft.   Genitourinary:     Comments: Indwelling catheter  Musculoskeletal:      Cervical back: Neck supple.      Comments: Generalized weakness and muscle atrophy left hip dressing clean and intact-moderate swelling appreciated.    Skin:     General: Skin is warm and dry.   Neurological:      General: No focal deficit present.      Mental Status: She is alert and oriented to person, place, and time.   Psychiatric:         Mood and Affect: Mood normal.         Behavior: Behavior normal.         Thought Content: Thought content normal.         Judgment: Judgment normal.   *MD performed and documented physical examination       Lines/Drains/Airways       Drain  Duration                  Urethral Catheter 11/03/22 1053 16 Fr. <1 day              Peripheral Intravenous Line  Duration                  Peripheral IV - Single Lumen 10/31/22 20 G Right Forearm 3 days                  Labs  Admission on 11/03/2022   Component Date Value Ref Range Status    Sodium Level 11/04/2022 137  136 - 145 mmol/L Final    Potassium Level 11/04/2022 3.9  3.5 - 5.1 mmol/L Final    Chloride 11/04/2022 106  98 - 107 mmol/L Final    Carbon Dioxide 11/04/2022 23  23 - 31 mmol/L Final    Glucose Level 11/04/2022 140 (H)  82 - 115 mg/dL Final    Blood Urea Nitrogen 11/04/2022 41.5 (H)  9.8 - 20.1 mg/dL Final    Creatinine 11/04/2022 0.80  0.55 - 1.02 mg/dL Final    Calcium Level Total 11/04/2022 8.3 (L)  8.4 - 10.2 mg/dL Final    Protein Total 11/04/2022 4.9 (L)  5.8 - 7.6 gm/dL Final    Albumin Level 11/04/2022 2.7 (L)  3.4 - 4.8 gm/dL Final    Globulin 11/04/2022 2.2 (L)  2.4 - 3.5 gm/dL Final    Albumin/Globulin Ratio 11/04/2022 1.2  1.1 - 2.0 ratio Final    Bilirubin Total 11/04/2022 1.4  <=1.5 mg/dL Final    Alkaline Phosphatase 11/04/2022 73  40 - 150 unit/L Final    Alanine Aminotransferase 11/04/2022 13  0 - 55 unit/L Final    Aspartate Aminotransferase 11/04/2022 27  5 - 34 unit/L Final    eGFR 11/04/2022 >60  mls/min/1.73/m2 Final    Magnesium Level 11/04/2022 2.50  1.60 - 2.60 mg/dL Final    Phosphorus Level 11/04/2022 2.4  2.3 - 4.7 mg/dL Final    Prealbumin 11/04/2022 9.7 (L)  14.0 - 37.0 mg/dL Final    Ferritin Level 11/04/2022 199.29  4.63 - 204.00 ng/mL  Final    Iron Binding Capacity Unsaturated 11/04/2022 118  70 - 310 ug/dL Final    Iron Level 11/04/2022 23 (L)  50 - 170 ug/dL Final    Transferrin 11/04/2022 128  mg/dL Final    Iron Binding Capacity Total 11/04/2022 141 (L)  250 - 450 ug/dL Final    Iron Saturation 11/04/2022 16 (L)  20 - 50 % Final    WBC 11/04/2022 8.1  4.5 - 11.5 x10(3)/mcL Final    RBC 11/04/2022 2.33 (L)  4.20 - 5.40 x10(6)/mcL Final    Hgb 11/04/2022 7.0 (L)  12.0 - 16.0 gm/dL Final    Hct 11/04/2022 22.0 (L)  37.0 - 47.0 % Final    MCV 11/04/2022 94.4 (H)  80.0 - 94.0 fL Final    MCH 11/04/2022 30.0  27.0 - 31.0 pg Final    MCHC 11/04/2022 31.8 (L)  33.0 - 36.0 mg/dL Final    RDW 11/04/2022 14.5  11.5 - 17.0 % Final    Platelet 11/04/2022 145  130 - 400 x10(3)/mcL Final    MPV 11/04/2022 10.6 (H)  7.4 - 10.4 fL Final    Neut % 11/04/2022 72.9  % Final    Lymph % 11/04/2022 14.3  % Final    Mono % 11/04/2022 11.6  % Final    Eos % 11/04/2022 0.6  % Final    Basophil % 11/04/2022 0.1  % Final    Lymph # 11/04/2022 1.16  0.6 - 4.6 x10(3)/mcL Final    Neut # 11/04/2022 5.9  2.1 - 9.2 x10(3)/mcL Final    Mono # 11/04/2022 0.94  0.1 - 1.3 x10(3)/mcL Final    Eos # 11/04/2022 0.05  0 - 0.9 x10(3)/mcL Final    Baso # 11/04/2022 0.01  0 - 0.2 x10(3)/mcL Final    IG# 11/04/2022 0.04  0 - 0.04 x10(3)/mcL Final    IG% 11/04/2022 0.5  % Final    NRBC% 11/04/2022 0.0  % Final    UNIT NUMBER 11/04/2022 [id number]   Preliminary    UNIT ABO/RH 11/04/2022 A NEG   Preliminary    DISPENSE STATUS 11/04/2022 Issued   Preliminary    Unit Expiration 11/04/2022 [id number]   Preliminary    Product Code 11/04/2022 [id number]   Preliminary    Unit Blood Type Code 11/04/2022 0600   Preliminary    CROSSMATCH INTERPRETATION 11/04/2022 Compatible   Preliminary    UNIT NUMBER 11/04/2022 [id number]   Preliminary    UNIT ABO/RH 11/04/2022 A NEG   Preliminary    DISPENSE STATUS 11/04/2022 Selected   Preliminary    Unit Expiration 11/04/2022 [id number]   Preliminary     Product Code 11/04/2022 [id number]   Preliminary    Unit Blood Type Code 11/04/2022 0600   Preliminary    CROSSMATCH INTERPRETATION 11/04/2022 Compatible   Preliminary    Group & Rh 11/04/2022 A NEG   Final    Indirect Ke GEL 11/04/2022 NEG   Final     Radiology  Left hip XR on 11/01/2022 at 9:34 a.m., IMPRESSION:Improved alignment following internal fixation of the left femur.    Assessment/Plan:     87 y.o. WF admitted on 11/3/2022    Left hip fracture   - s/p IMN on 11/01/2022  - WBAT to RLE  - staples to remove on 11/14/2022  - continue    Oxycodone 5 mg q.4 hours p.r.n.     Robaxin 750 mg t.i.d.     Tylenol 650 mg t.i.d.  - Consult physiatry for rehab and pain management  - PT/OT/RT/ST to eval and treat     Unprovoked left lower extremity DVT  - Diagnosis in 2020  - continue    Eliquis 5 mg b.i.d. hold 11/4 due to anemia    Normocytic anemia  - asymptomatic  - s/p transfusion   x1 units PRBC on 11/3/2022  - H/H trending down  - obtain type and crossmatch and transfuse 2 units on 11/04  - initiate Lasix 20 mg IVP to between units  - no evidence of active bleeds  - will closely monitor and transfuse if needed     GERD  - Avoid spicy foods, and nothing to eat or drink within x2 hours of bedtime or laying flat (water is ok)   - Avoid NSAIDs (Advil, ibuprofen, naproxen...) and shankar-2 inhibitors (Mobic, Celebrex)    - continue   Protonix 40 mg daily     Vitamin D deficiency  - obtain Vitamin D level in am  - continue    Vitamin-D 5000 units daily     Constipation  - stable   - continue  Colace 100 mg BID   Miralax 17 gm BID PRN    Urinary retention   - current   - Indwelling catheter reinserted 11/2   - plan to discontinue indwelling catheter 11/9    MEDICAL PLAN:  - Admit to [facility]  - Medical reconciliation completed and included in the electronic health record  - Draw admit labs including CBC, CMP, and Prealbumin  - Maintain weightbearing status as ordered  -  Monitor postoperative surgical incision changing dressing daily  - Teach skin protection and wound prevention techniques  - Initiate fall precaution  - Initiate bowel training program  - Initiate bladder training as indicated  - Pain management  - IS q2 hours while awake    THERAPEUTIC PLAN:  - Physical therapy 60-90 minutes 5 to week to increase functional mobility, maintain weightbearing precautions, increase lower extremity restrengthening, increase overall activity tolerance, teach balance and safety measures as well as fall precautions, make equipment and orthotic recommendations, be involved in family training and discharge planning, monitor pain levels and vital signs during therapy adjusting treatment accordingly  - Occupational therapy 60-90 minutes 5 times a week to increase functional independence with activities of daily living, maintain weightbearing precautions, teach use of adaptive equipment, increase upper extremity restrengthening, increase overall activity tolerance, teach balance and safety measures as well as fall precautions, make equipment and orthotic recommendations, be involved in family training and discharge planning, monitor pain levels and vital signs during therapy adjusting treatment accordingly  - Speech and language pathology will do an in-depth evaluation of patient's cognition, phonation, and swallowing. They will initiate therapy 30- 60 minutes 5 times a week as indicated  - Recreational therapy will incorporate all patient's functional abilities  into recreational activities that will require visual, spatial, and perceptual abilities; gross and fine motor coordination skills; the cognition to follow multistep commands; dynamic and static balance and reaching abilities. They will also incorporate animal assisted therapy into their weekly program  - Rehabilitation nursing will act as patient family physician liaison. They will integrate patient's functional abilities, after  discussions with therapist, into everyday activities with the CNA's,  LPN's and RNs that will include but are not limited to dressing, bathing, feeding, grooming, toileting, transfers, hygiene etc. They will administer medications watching for wanted as well as unwanted effects. They will initiate DVT/VTE prophylaxis as ordered. Will monitor vital signs, lab values, and pain levels, making appropriate physician notification. They will monitor skin integrity including postop incision, making daily dressing changes. They will teach skin protection and wound prevention techniques. They will initiate bowel training They will initiate bladder training as indicated. They will initiate fall precautions  - Rehabilitation counseling/case management will act as patient and family liaison. They will set up family conferences, family training, aid in discharge planning, and aid in the procurement of assistive devices  - Patient will have 24 hour availability to physiatric care  - Patient will have nutritional services involved, monitoring oral input and visceral protein stores. They will make dietary and supplement recommendations and follow-up as necessary  - Patient will have weekly interdisciplinary team conferences  - Patient will have initial family conference and follow up conferences as indicated  - Patient will have family training prior to discharge     Estimated length of stay - 14-17 days  Anticipated discharge destination - Home with   Medical status - stabilizing postoperatively; will need to monitor pain levels, postoperative skin integrity, watch for evidence of deep vein thrombosis, monitor postoperative H&H, monitor vital signs closely.  Medical prognosis - Good for post-op healing and functional improvement  Previous functional Status:  Independent  Present Functional Status:  Min assist    VTE Prophylaxis:  Eliquis 5 mg b.i.d.-held 11/4 due to anemia  COVID-19 testing:  Negative on 11/03/2022  COVID-19  vaccination status:  Vaccinated (Moderna):  07/23/2021 and 08/20/2021, and received 2 boosters    POA: Yes  Living will: Yes  Contacts: Lukas Joshua (son) [phone]     Marya Gaytan (daughter) [phone]    CODE STATUS: Full Code  Internal Medicine (attending): Cb Fall MD  Physiatry (consulting):  Toño Polk MD    OUTPATIENT PROVIDERS  PCP:  King Duvall MD  Orthopedic surgery:  Florentino Friend MD    DISPOSITION: Condition stable. Tolerated transfer.  Recent labs and imaging reviewed.  Rehab admission orders initiated.  Med reconciliation completed.  Consult physiatry for rehab and pain management.  PT/OT/RT/ST to eval and treat.  Sleep hygiene, bowel maintenance, and appetite at goal.  Vital signs at goal with no recorded fevers.  H&H trending down.  Hold Eliquis and transfuse 2 units PRBC.  Repeat lab work in the morning.  Initiate Lasix 20 mg IVP x1 between units.  Would likely benefit aggressive diuresis over the weekend.  Continue to monitor closely.  Notify of acute changes.    PHYSICIAN ATTESTATION: I have been involved in the patient's rehabilitation prescreening process and I have now completed the post admission physician evaluation. Patient is in need of, appropriate for, and should tolerate the above outlined inpatient rehabilitation plan. I believe this is necessary to reach maximal postoperative healing and maximal functional abilities. I do not believe this would be possible in a timely fashion in a less intensive setting      Brooks Madrid NP conducted independent physical examination and assisted with medical documentation.    Total time spent on this encounter including chart review and direct MD + NP 1-on-1 patient interaction: 128 minutes   Over 50% of this time was spent in counseling and coordination of care

## 2022-11-03 NOTE — PROGRESS NOTES
Mrs. Joshua and family verbalized understanding of discharge instructions, importance of keeping follow-up appointments, and s/s to be aware of that require immediate medical attention. All questions and concerns addressed at this time. She is sitting in chair awaiting transport. Report given to SHERITA Moses, at [facility]. Opportunity for questions given and answered.

## 2022-11-03 NOTE — PLAN OF CARE
Pt accepted today at [facility]. Covid test ordered  Pt will be getting 1 unit of blood prior to leaving  Bed at the above will not be ready until after 1300 and nursing here would like transport time later than 1300 to allow for blood transfusion  Dr Stokes says he is fine with transfer post blood transfusion.   Pts transport time is 1500 with baltazar and nursing to call report closer to 1500 and has the number

## 2022-11-04 ENCOUNTER — TELEPHONE (OUTPATIENT)
Dept: INTERNAL MEDICINE | Facility: CLINIC | Age: 87
End: 2022-11-04
Payer: MEDICARE

## 2022-11-04 LAB
ABO + RH BLD: NORMAL
ABO + RH BLD: NORMAL
ALBUMIN SERPL-MCNC: 2.7 GM/DL (ref 3.4–4.8)
ALBUMIN/GLOB SERPL: 1.2 RATIO (ref 1.1–2)
ALP SERPL-CCNC: 73 UNIT/L (ref 40–150)
ALT SERPL-CCNC: 13 UNIT/L (ref 0–55)
AST SERPL-CCNC: 27 UNIT/L (ref 5–34)
BASOPHILS # BLD AUTO: 0.01 X10(3)/MCL (ref 0–0.2)
BASOPHILS NFR BLD AUTO: 0.1 %
BILIRUBIN DIRECT+TOT PNL SERPL-MCNC: 1.4 MG/DL
BLD PROD TYP BPU: NORMAL
BLD PROD TYP BPU: NORMAL
BLOOD UNIT EXPIRATION DATE: NORMAL
BLOOD UNIT EXPIRATION DATE: NORMAL
BLOOD UNIT TYPE CODE: 600
BLOOD UNIT TYPE CODE: 600
BUN SERPL-MCNC: 41.5 MG/DL (ref 9.8–20.1)
CALCIUM SERPL-MCNC: 8.3 MG/DL (ref 8.4–10.2)
CHLORIDE SERPL-SCNC: 106 MMOL/L (ref 98–107)
CO2 SERPL-SCNC: 23 MMOL/L (ref 23–31)
CREAT SERPL-MCNC: 0.8 MG/DL (ref 0.55–1.02)
CROSSMATCH INTERPRETATION: NORMAL
CROSSMATCH INTERPRETATION: NORMAL
DISPENSE STATUS: NORMAL
DISPENSE STATUS: NORMAL
EOSINOPHIL # BLD AUTO: 0.05 X10(3)/MCL (ref 0–0.9)
EOSINOPHIL NFR BLD AUTO: 0.6 %
ERYTHROCYTE [DISTWIDTH] IN BLOOD BY AUTOMATED COUNT: 14.5 % (ref 11.5–17)
FERRITIN SERPL-MCNC: 199.29 NG/ML (ref 4.63–204)
GFR SERPLBLD CREATININE-BSD FMLA CKD-EPI: >60 MLS/MIN/1.73/M2
GLOBULIN SER-MCNC: 2.2 GM/DL (ref 2.4–3.5)
GLUCOSE SERPL-MCNC: 140 MG/DL (ref 82–115)
GROUP & RH: NORMAL
HCT VFR BLD AUTO: 22 % (ref 37–47)
HGB BLD-MCNC: 7 GM/DL (ref 12–16)
IMM GRANULOCYTES # BLD AUTO: 0.04 X10(3)/MCL (ref 0–0.04)
IMM GRANULOCYTES NFR BLD AUTO: 0.5 %
INDIRECT COOMBS GEL: NORMAL
IRON SATN MFR SERPL: 16 % (ref 20–50)
IRON SERPL-MCNC: 23 UG/DL (ref 50–170)
LYMPHOCYTES # BLD AUTO: 1.16 X10(3)/MCL (ref 0.6–4.6)
LYMPHOCYTES NFR BLD AUTO: 14.3 %
MAGNESIUM SERPL-MCNC: 2.5 MG/DL (ref 1.6–2.6)
MCH RBC QN AUTO: 30 PG (ref 27–31)
MCHC RBC AUTO-ENTMCNC: 31.8 MG/DL (ref 33–36)
MCV RBC AUTO: 94.4 FL (ref 80–94)
MONOCYTES # BLD AUTO: 0.94 X10(3)/MCL (ref 0.1–1.3)
MONOCYTES NFR BLD AUTO: 11.6 %
NEUTROPHILS # BLD AUTO: 5.9 X10(3)/MCL (ref 2.1–9.2)
NEUTROPHILS NFR BLD AUTO: 72.9 %
NRBC BLD AUTO-RTO: 0 %
PHOSPHATE SERPL-MCNC: 2.4 MG/DL (ref 2.3–4.7)
PLATELET # BLD AUTO: 145 X10(3)/MCL (ref 130–400)
PMV BLD AUTO: 10.6 FL (ref 7.4–10.4)
POTASSIUM SERPL-SCNC: 3.9 MMOL/L (ref 3.5–5.1)
PREALB SERPL-MCNC: 9.7 MG/DL (ref 14–37)
PROT SERPL-MCNC: 4.9 GM/DL (ref 5.8–7.6)
RBC # BLD AUTO: 2.33 X10(6)/MCL (ref 4.2–5.4)
SODIUM SERPL-SCNC: 137 MMOL/L (ref 136–145)
TIBC SERPL-MCNC: 118 UG/DL (ref 70–310)
TIBC SERPL-MCNC: 141 UG/DL (ref 250–450)
TRANSFERRIN SERPL-MCNC: 128 MG/DL
UNIT NUMBER: NORMAL
UNIT NUMBER: NORMAL
WBC # SPEC AUTO: 8.1 X10(3)/MCL (ref 4.5–11.5)

## 2022-11-04 PROCEDURE — 84134 ASSAY OF PREALBUMIN: CPT | Performed by: NURSE PRACTITIONER

## 2022-11-04 PROCEDURE — 97542 WHEELCHAIR MNGMENT TRAINING: CPT

## 2022-11-04 PROCEDURE — 85025 COMPLETE CBC W/AUTO DIFF WBC: CPT | Performed by: NURSE PRACTITIONER

## 2022-11-04 PROCEDURE — 97166 OT EVAL MOD COMPLEX 45 MIN: CPT

## 2022-11-04 PROCEDURE — 99233 SBSQ HOSP IP/OBS HIGH 50: CPT | Mod: ,,, | Performed by: NURSE PRACTITIONER

## 2022-11-04 PROCEDURE — 86920 COMPATIBILITY TEST SPIN: CPT | Performed by: NURSE PRACTITIONER

## 2022-11-04 PROCEDURE — P9016 RBC LEUKOCYTES REDUCED: HCPCS | Performed by: NURSE PRACTITIONER

## 2022-11-04 PROCEDURE — 11800000 HC REHAB PRIVATE ROOM

## 2022-11-04 PROCEDURE — 86850 RBC ANTIBODY SCREEN: CPT | Performed by: NURSE PRACTITIONER

## 2022-11-04 PROCEDURE — 25000003 PHARM REV CODE 250: Performed by: NURSE PRACTITIONER

## 2022-11-04 PROCEDURE — 83735 ASSAY OF MAGNESIUM: CPT | Performed by: NURSE PRACTITIONER

## 2022-11-04 PROCEDURE — 84100 ASSAY OF PHOSPHORUS: CPT | Performed by: NURSE PRACTITIONER

## 2022-11-04 PROCEDURE — 97162 PT EVAL MOD COMPLEX 30 MIN: CPT

## 2022-11-04 PROCEDURE — 97110 THERAPEUTIC EXERCISES: CPT

## 2022-11-04 PROCEDURE — 83540 ASSAY OF IRON: CPT | Performed by: NURSE PRACTITIONER

## 2022-11-04 PROCEDURE — 36415 COLL VENOUS BLD VENIPUNCTURE: CPT | Performed by: NURSE PRACTITIONER

## 2022-11-04 PROCEDURE — 97535 SELF CARE MNGMENT TRAINING: CPT

## 2022-11-04 PROCEDURE — 99233 PR SUBSEQUENT HOSPITAL CARE,LEVL III: ICD-10-PCS | Mod: ,,, | Performed by: NURSE PRACTITIONER

## 2022-11-04 PROCEDURE — 94761 N-INVAS EAR/PLS OXIMETRY MLT: CPT

## 2022-11-04 PROCEDURE — 94799 UNLISTED PULMONARY SVC/PX: CPT

## 2022-11-04 PROCEDURE — 97530 THERAPEUTIC ACTIVITIES: CPT

## 2022-11-04 PROCEDURE — 82728 ASSAY OF FERRITIN: CPT | Performed by: NURSE PRACTITIONER

## 2022-11-04 PROCEDURE — 63600175 PHARM REV CODE 636 W HCPCS: Performed by: NURSE PRACTITIONER

## 2022-11-04 PROCEDURE — 80053 COMPREHEN METABOLIC PANEL: CPT | Performed by: NURSE PRACTITIONER

## 2022-11-04 RX ORDER — METHOCARBAMOL 750 MG/1
750 TABLET, FILM COATED ORAL ONCE
Status: COMPLETED | OUTPATIENT
Start: 2022-11-04 | End: 2022-11-04

## 2022-11-04 RX ORDER — HYDROCODONE BITARTRATE AND ACETAMINOPHEN 500; 5 MG/1; MG/1
TABLET ORAL
Status: DISCONTINUED | OUTPATIENT
Start: 2022-11-04 | End: 2022-11-17 | Stop reason: HOSPADM

## 2022-11-04 RX ORDER — METHOCARBAMOL 750 MG/1
750 TABLET, FILM COATED ORAL 4 TIMES DAILY
Status: DISCONTINUED | OUTPATIENT
Start: 2022-11-04 | End: 2022-11-17 | Stop reason: HOSPADM

## 2022-11-04 RX ORDER — FUROSEMIDE 10 MG/ML
20 INJECTION INTRAMUSCULAR; INTRAVENOUS ONCE
Status: COMPLETED | OUTPATIENT
Start: 2022-11-04 | End: 2022-11-04

## 2022-11-04 RX ORDER — ACETAMINOPHEN 325 MG/1
650 TABLET ORAL 3 TIMES DAILY
Status: DISCONTINUED | OUTPATIENT
Start: 2022-11-04 | End: 2022-11-15

## 2022-11-04 RX ADMIN — DOCUSATE SODIUM 100 MG: 100 CAPSULE, LIQUID FILLED ORAL at 09:11

## 2022-11-04 RX ADMIN — METHOCARBAMOL 750 MG: 750 TABLET ORAL at 05:11

## 2022-11-04 RX ADMIN — ACETAMINOPHEN 650 MG: 325 TABLET, FILM COATED ORAL at 09:11

## 2022-11-04 RX ADMIN — PANTOPRAZOLE SODIUM 40 MG: 40 TABLET, DELAYED RELEASE ORAL at 08:11

## 2022-11-04 RX ADMIN — MUPIROCIN: 20 OINTMENT TOPICAL at 09:11

## 2022-11-04 RX ADMIN — DOCUSATE SODIUM 100 MG: 100 CAPSULE, LIQUID FILLED ORAL at 08:11

## 2022-11-04 RX ADMIN — METHOCARBAMOL 750 MG: 750 TABLET ORAL at 09:11

## 2022-11-04 RX ADMIN — ACETAMINOPHEN 650 MG: 325 TABLET, FILM COATED ORAL at 02:11

## 2022-11-04 RX ADMIN — FUROSEMIDE 20 MG: 10 INJECTION, SOLUTION INTRAMUSCULAR; INTRAVENOUS at 04:11

## 2022-11-04 RX ADMIN — METHOCARBAMOL 750 MG: 750 TABLET ORAL at 02:11

## 2022-11-04 NOTE — PT/OT/SLP EVAL
Recreational Therapy Evaluation      Date of Treatment: 11/04/22  Start Time: 1030  Stop Time: 1100  Total Time: 30 min  Missed Time:     Assessment      Niecy Joshua is a 87 y.o. female admitted with a medical diagnosis of Closed comminuted intertrochanteric fracture of left femur.  She presents with the following impairments/functional limitations:  weakness, impaired endurance, impaired functional mobility, gait instability, decreased lower extremity function, decreased safety awareness  .    Rehab Diagnosis:      Recent Surgery: * No surgery found *      General Precautions: Standard, fall     Orthopedic Precautions:LLE weight bearing as tolerated     Braces: N/A    Rehab Prognosis: Good; patient would benefit from acute skilled Recreational Therapy services to address these deficits and reach maximum level of function.      Impairments: Endurance deficits, Mobility deficits, Safety awareness deficits, and Strength deficits  Rehab Potential: Good  Treatment Recommendations: Continue with Skill TR Service to facilitate functional independence and address impairments/limitations   Treatment Diagnosis: Fall, L intertrochanteric femur fx, IMN, DVT, osteoporosis, vitamin D deficiency,  Orientation: Oriented x4  Affect/Behavior: Appropriate and Cooperative  Safety/Judgement: intact   Basic Command Following: intact  Spiritual Cultural: no        History     Past Medical History:   Diagnosis Date    Cardiac arrhythmia     Low vitamin D level     Osteoporosis     Other pulmonary embolism without acute cor pulmonale        Past Surgical History:   Procedure Laterality Date    BREAST MASS EXCISION      x3  benign    LAPAROSCOPIC REPAIR OF INGUINAL HERNIA Bilateral        Home Environment     Admit Date: 11/03/22  Living Situation  Lives With: alone  Lives in: house  Patients Responsibilities: , Financial management, Health and wellness, Laundry, Leisure/play/hobbies, Meal preparation, Retired, Shopping, Yard  "Work  Number of Children: 4  Occupation:Retired:     Instrumental Activities of Daily Living     Previous Hand Dominance: Right Current Hand Dominance: Right     Other iADL Information:        Cognitive Skills Building         Cognitive Observation Activity Assist Position Equipment Response                  Dynamic Activities      Activity Assist Position Equipment Response   Activity 1 Bowling supervision Standing Rolling walker and Rubber bowling balls good   Comment: Sit to stand was supervision as was dynamic standing balance/reaching. Standing tolerance was 3 minutes at a time. UE coordination was I as were problem solving skills. Cooperative       Fine Motor Activities      Activity Assist Position Equipment Response                 Goals     Patient Goals  Patient Goal 1: "To be able to walk."    Short Term Goals    Goal  Goal Status   Will increase sit to stand to supervision Initiated   Will improve dynamic standing balance/reaching to supervision Initiated                 Long Term Goals    Goal Goal Status   Will increase standing tolerance to 5 mintues Initiated   Will improve dynamic standing balance/reaching to setup Initiated                     Plan       Patient to be seen: Daily  Duration: 2 weeks  Treatments planned: Coordination, Energy conservation training, Safety education  Treatment plan/goals established with Patient/Caregiver: Yes     "

## 2022-11-04 NOTE — PROGRESS NOTES
Subjective:  HPI: 87 y.o. WF with PMH of DVT, osteoporosis, and vitamin-D deficiency presented to [facility] ED on 10/31/2022 for mechanical fall.  Patient reports she was walking to the bathroom when she fell onto her left hip.  Patient denies hitting head, LOC, chest pain, shortness of breath, abdominal pain, nausea, vomiting, dizziness, syncope, and fever.  Patient reports improvement of left hip pain after pain medications given in ED. Initial vital signs in the ED were /68, pulse 70, respirations 16, temperature 36.8° C, and SpO2 97%.  Labs revealed WBC 14.8, chloride 109, BUN 22.4, and glucose 148.  Chest x-ray revealed no acute pulmonary process.  Left hip x-ray revealed comminuted left intertrochanteric femur fracture.  Orthopedics was consulted with plans for surgery. Patient was admitted to hospital medicine service for further medical management. She underwent left intertrochanteric femur fracture intramedullary nailing on 11/1 with Dr. Friend. Participating with therapy. Functional status includes bed mobility and transfers requiring . Amb w/RW for . Patient was evaluated, accepted, and admitted to inpatient rehab to improve functional status. Transferred to [facility] on / without incident.    11/5: Seen in patient room, seated in bed with breakfast tray in front of her. Reports appetite is better. States that bowels have not moved, but she feels like she could possibly go if sat on commode. Discussed with CNA. Reports good sleep. Complaints of breakfast not being set up for her this morning. Needs to be OOB with meals. Drainage to incision slowed. Dressing not saturated. VSSAF.     Review of Systems  Depression/Anxiety: no complaints     ALPRAZolam tablet 0.25 mg TID PRN Anxiety  Pain: left hip with movement-controlled     acetaminophen tablet 650 mg TID  methocarbamoL tablet 750 mg TID  acetaminophen tablet 650 mg q4hr PRN mild pain  oxyCODONE immediate release tablet 5 mg q4hr PRN mod pain  Bowels/Bladder:  last BM 11/3 (loose, incontinent-small amount)    Appetite: decreased from baseline-improving    Sleep: good          Physical Exam  General: well-developed, well-nourished, in no acute distress  Respiratory: equal chest rise, no SOB, no audible wheeze  Cardiovascular: regular rate and rhythm, no edema  Gastrointestinal: soft, non-tender, non-distended   Musculoskeletal: decreased ROM/strength to LLE  Integumentary: no rashes or skin lesions present, Left hip incisions x 4-staples, dressings-drainage saturated dressing   Neurologic: cranial nerves intact, no signs of peripheral neurological deficit, motor/sensory function intact      Labs:   Latest Reference Range & Units 11/05/22 05:41   WBC 4.5 - 11.5 x10(3)/mcL 7.8   RBC 4.20 - 5.40 x10(6)/mcL 3.41 (L)   Hemoglobin 12.0 - 16.0 gm/dL 9.8 (L)   Hematocrit 37.0 - 47.0 % 29.5 (L)   MCV 80.0 - 94.0 fL 86.5   MCH 27.0 - 31.0 pg 28.7   MCHC 33.0 - 36.0 mg/dL 33.2   RDW 11.5 - 17.0 % 19.3 (H)   Platelets 130 - 400 x10(3)/mcL 155   MPV 7.4 - 10.4 fL 9.8   Neut % % 61.8   LYMPH % % 22.9   Mono % % 12.6   Eosinophil % % 2.1   Basophil % % 0.1   Immature Granulocytes % 0.5   Neut # 2.1 - 9.2 x10(3)/mcL 4.8   Lymph # 0.6 - 4.6 x10(3)/mcL 1.78   Mono # 0.1 - 1.3 x10(3)/mcL 0.98   Eos # 0 - 0.9 x10(3)/mcL 0.16   Baso # 0 - 0.2 x10(3)/mcL 0.01   Immature Grans (Abs) 0 - 0.04 x10(3)/mcL 0.04   nRBC % 0.0   Sodium 136 - 145 mmol/L 139   Potassium 3.5 - 5.1 mmol/L 3.9   Chloride 98 - 107 mmol/L 107   CO2 23 - 31 mmol/L 26   Anion Gap mEq/L 6.0   BUN 9.8 - 20.1 mg/dL 37.8 (H)   Creatinine 0.55 - 1.02 mg/dL 0.80   BUN/CREAT RATIO  47   eGFR mls/min/1.73/m2 >60   Glucose 82 - 115 mg/dL 115   Calcium 8.4 - 10.2 mg/dL 8.3 (L)   Magnesium 1.60 - 2.60 mg/dL 2.20   Vit D, 25-Hydroxy 30.0 - 80.0 ng/mL 32.0   (L): Data is abnormally low  (H): Data is abnormally high      Assessment/Plan  Hospital   Closed comminuted intertrochanteric fracture of left femur     Non-Hospital   Osteoporosis    Cardiac arrhythmia   Other pulmonary embolism without acute cor pulmonale   Chronic deep vein thrombosis (DVT) of distal vein of lower extremity   Low vitamin D level       Wounds: Left hip incisions x 4-staples, dressings-drainage saturated dressing   S/p left intertrochanteric femur fracture intramedullary nailing on 11/1 with Dr. Friend  Precautions: WBAT LLE  Bracing/AD: RW  Swallowing: Regular Diet  Function: Tolerating therapy. Continue PT/OT  VTE Prophylaxis: SCDs. Eliquis on Hold. Receiving Blood.   Code Status: FULL CODE   Discharge:Lives alone in the [place] in a single-story home with 4-5 steps and bilateral rails to enter the residence. She completed high school. No  history. She is a retired . Patient is . She lives alone and was completely independent. Pts son lives behind her. He states that they have good family support and will be able to assist her after rehab.    Children (4). Date pending.

## 2022-11-04 NOTE — PLAN OF CARE
Problem: Rehabilitation (IRF) Plan of Care  Goal: Plan of Care Review  11/4/2022 1540 by Masha Travis RN  Outcome: Ongoing, Progressing  11/4/2022 1447 by Masha Travis RN  Outcome: Ongoing, Progressing  Goal: Patient-Specific Goal (Individualized)  11/4/2022 1540 by Masha Travis RN  Outcome: Ongoing, Progressing  11/4/2022 1447 by Masha Travis RN  Outcome: Ongoing, Progressing  Goal: Absence of New-Onset Illness or Injury  11/4/2022 1540 by Masha Travis RN  Outcome: Ongoing, Progressing  11/4/2022 1447 by Masha Travis RN  Outcome: Ongoing, Progressing  Goal: Optimal Comfort and Wellbeing  11/4/2022 1540 by Masha Travis RN  Outcome: Ongoing, Progressing  11/4/2022 1447 by Masha Travis RN  Outcome: Ongoing, Progressing  Goal: Readiness for Transition of Care  11/4/2022 1540 by Masha Travis RN  Outcome: Ongoing, Progressing  11/4/2022 1447 by Masha Travis RN  Outcome: Ongoing, Progressing     Problem: Impaired Wound Healing  Goal: Optimal Wound Healing  11/4/2022 1540 by Masha Travis RN  Outcome: Ongoing, Progressing  11/4/2022 1447 by Masha Travis RN  Outcome: Ongoing, Progressing     Problem: Skin Injury Risk Increased  Goal: Skin Health and Integrity  11/4/2022 1540 by Masha Travis RN  Outcome: Ongoing, Progressing  11/4/2022 1447 by Masha Travis RN  Outcome: Ongoing, Progressing     Problem: Fall Injury Risk  Goal: Absence of Fall and Fall-Related Injury  11/4/2022 1540 by Masha Travis RN  Outcome: Ongoing, Progressing  11/4/2022 1447 by Masha Travis RN  Outcome: Ongoing, Progressing     Problem: Infection  Goal: Absence of Infection Signs and Symptoms  11/4/2022 1540 by Masha Travis RN  Outcome: Ongoing, Progressing  11/4/2022 1447 by Masha Travis RN  Outcome: Ongoing, Progressing

## 2022-11-04 NOTE — TELEPHONE ENCOUNTER
----- Message from Jackelin Randolph sent at 11/4/2022  8:51 AM CDT -----  Regarding: question about allergies  Would like a call about allergies that were put in patients chart on 8/8/22  .    Patients son said she has no allgeries.       Lukas Joshua  [phone]

## 2022-11-04 NOTE — PT/OT/SLP EVAL
"Physical Therapy Rehab Evaluation    Patient Name:  Niecy Joshua   MRN:  [id number]    Recommendations:     Discharge Recommendations:      Discharge Equipment Recommendations: walker, rolling   Barriers to discharge: Inaccessible home, Decreased caregiver support, and impaired functional mobility, pain    Assessment:     Niecy Joshua is a 87 y.o. female admitted with a medical diagnosis of Closed comminuted intertrochanteric fracture of left femur.  She presents with the following impairments/functional limitations:  weakness, impaired endurance, impaired self care skills, impaired functional mobility, gait instability, impaired balance, decreased lower extremity function, pain, edema .    Rehab Diagnosis: L femur fx s/p IM nail    Recent Surgery: * No surgery found *      General Precautions: Standard, fall     Orthopedic Precautions: LLE weight bearing as tolerated     Braces: N/A    Rehab Prognosis: Good; patient would benefit from acute skilled PT services to address these deficits and reach maximum level of function.      History:     Past Medical History:   Diagnosis Date    Cardiac arrhythmia     Low vitamin D level     Osteoporosis     Other pulmonary embolism without acute cor pulmonale        Past Surgical History:   Procedure Laterality Date    BREAST MASS EXCISION      x3  benign    LAPAROSCOPIC REPAIR OF INGUINAL HERNIA Bilateral        Subjective     Chief Complaint: L LE pain, fatigue  Patient/Family Comments/goals: "to get moving like I used"    Patients cultural, spiritual, Church conflicts given the current situation: no       Living Environment  Lives With: alone  Home Accessibility: stairs to enter home  Number of Stairs, Main Entrance: five  Stair Railings, Main Entrance: railings on both sides of stairs    Prior Level of Function  Ambulation Skills: independent  Stairs: independent  Transfer Skills: independent  ADL Skills: independent  Work/Leisure Activity: independent  Cognitive " Communication: independent    Equipment used at home:  .  DME owned (not currently used): none.      Upon discharge, patient will have assistance from family.    Objective:     Communicated with pt prior to session.  Patient found up in chair with    upon PT entry to room.    Vitals   Vitals at Rest  BP (!) 96/58     HR 97   O2 Sat     Pain Pain Rating 1: 4/10  Location - Side 1: Left  Location 1: hip  Pain Addressed 1: Cessation of Activity, Reposition  Pain Rating Post-Intervention 1: 0/10       Respiratory Status: Room air    Exams  Cognitive Exam:  Patient is oriented to Person, Place, Time, and Situation      GGs   Admit Current   Status  Goal   Functional Area: Care Score: Care Score:  Care Score:   Roll Left and Right 3 3 HOB flat, no bed rails Independent   Sit to Lying 3 3 Assist with B LE Independent   Lying to Sitting on Side of Bed 3 3 Assist with B LE Independent   Sit to Stand 3 3  Independent   Chair/Bed-to-Chair Transfer 3 3  Independent   Car Transfer 88 88  Supervision or touching assistance   Walk 10 Feet 3 3 Pt amb 10' min A with RW; very slow gait speed with small B step sizes and short standing breaks throughout. Independent   Walk 50 Feet with Two Turns 88 88  Supervision or touching assistance   Walk 150 Feet 88 88  Supervision or touching assistance   Walk 10 Feet Uneven Surface 88 88  Supervision or touching assistance   1 Step (Curb) 88 88  Supervision or touching assistance   4 Steps 88 88  Supervision or touching assistance   12 Steps 88 88  Partial/moderate assistance   Picking Up Object 88 88  Independent   Wheel 50 Feet with Two Turns 4 4  Independent   Wheel 150 Feet 3 3 Pt propelled manual w/c 100' with SBA; very slow speed with small pushes using B UE Independent     Therapeutic Activities and Exercises:  Sit<>stands with static standing balance/standing tolerance for changing wound dressing; CGA-min A provided with use of RW.    Activity Tolerance: Fair    Patient left up in chair  with call button in reach and chair alarm on.    Education Provided: roles and goals of PT/PTA, transfer training, bed mob, gait training, safety awareness, body mechanics, assistive device, and wheelchair management    Expected compliance: High compliance    GOALS:   Multidisciplinary Problems       Physical Therapy Goals          Problem: Physical Therapy    Goal Priority Disciplines Outcome Goal Variances Interventions   Physical Therapy Goal     PT, PT/OT Ongoing, Progressing     Description: Bed Mobility:  Roll left and right with supervision/touching assist.  Sit to supine transfer with supervision/touching assist.  Supine to sit transfer with supervision/touching assist.    Transfers:  Sit to stand transfer with supervision/touching assist using RW.  Bed to chair transfer with supervision/touching assist using RW.  Car transfer with partial/moderate assist using RW.   an object from the ground in standing position with supervision/touching assist using RW and reacher.    Mobility:  Ambulate 50 feet with supervision/touching assist using RW.  Ambulate 15 feet on uneven surfaces/ramps with partial/moderate assist using RW.  Ascend/descend a 4 inch curb with partial/moderate assist using RW.   Ascend/descend 5 stairs with partial/moderate assist using left handrail.                         Plan:     During this hospitalization, patient to be seen daily (5-7x/week) to address the identified rehab impairments via gait training, therapeutic exercises, therapeutic activities, wheelchair management/training and progress toward the following goals:    Plan of Care Expires:  11/10/22  PT Next Visit Date: 11/10/22  Plan of Care reviewed with: patient      Additional Infomation:         Time Tracking:     Therapy Time   PT Start Time: 0930  PT Stop Time: 1030  PT Total Time (min): 60 min  PT Individual: 60  Missed Time:    Time Missed due to:      Billable Minutes: Evaluation 30 min, Therapeutic Activity 15 min,  and Train/Wheelchair Management 15 min    11/04/2022

## 2022-11-04 NOTE — CONSULTS
Chief Complaint  comminuted left intertrochanteric femur fracture    Reason for Consultation  Physiatry    History of Present Illness  Admit MD: Cb Fall MD  Consult Physiatry: Toño Polk MD  HPI: 87 y.o. WF with PMH of DVT, osteoporosis, and vitamin-D deficiency presented to [facility] ED on 10/31/2022 for mechanical fall.  Patient reports she was walking to the bathroom when she fell onto her left hip.  Patient denies hitting head, LOC, chest pain, shortness of breath, abdominal pain, nausea, vomiting, dizziness, syncope, and fever.  Patient reports improvement of left hip pain after pain medications given in ED. Initial vital signs in the ED were /68, pulse 70, respirations 16, temperature 36.8° C, and SpO2 97%.  Labs revealed WBC 14.8, chloride 109, BUN 22.4, and glucose 148.  Chest x-ray revealed no acute pulmonary process.  Left hip x-ray revealed comminuted left intertrochanteric femur fracture.  Orthopedics was consulted with plans for surgery. Patient was admitted to hospital medicine service for further medical management. She underwent left intertrochanteric femur fracture intramedullary nailing on 11/1 with Dr. Friend. Participating with therapy. Functional status includes bed mobility and transfers requiring . Amb w/RW for . Patient was evaluated, accepted, and admitted to inpatient rehab to improve functional status. Transferred to [facility] on / without incident.    11/4: Seen in patient room, seated in WC with OT preparing for bathing activity. Reports good sleep. Appetite decreased. Tells me she had multiple loose stools when coming out of surgery and did have an incontinent episode yesterday. Tells me that she does take dulcolax nightly at home. Hold Colace to allow stool to become formed. Denies pain at rest. Pain controlled with medication overall. Therapy evaluating. VSSAF with noted drop in SpO2 to 91% last night. 94% this morning on RA. IM following as well. IM giving 2 units of PRBC and  Lasix. Eliquis discontinued.      Review of Systems  Barriers to Discharge:  Social: She completed high school. No  history. She is a retired . Patient is . She lives alone and was completely independent. Pts son lives behind her. He states that they have good family support and will be able to assist her after rehab.    Children (4)    Medical: comminuted left intertrochanteric femur fracture 2/2 fall, anemia, DVT, osteoporosis, Cardiac arrhythmia, and vitamin-D deficiency     Functional:   Prior Level of Fx:  Independent ADLs, drives, cooks, does chores, does laundry, grocery shops, manages finances, manages own meds, and does yard work. She does not have a medic alert.    Residence: Lives alone in the [place] in a single-story home with 4-5 steps and bilateral rails to enter the residence. She uses a walk-in shower with a threshold and HHS. No grab bars. She has a small built-in seat in the shower. She does not have an elevated toilet. No grab bars.    DME: RW, HHS, cane.   Psychiatric: Hx mental health/substance abuse: No history of mental health or substance abuse issues.     Depression/Anxiety: no complaints     ALPRAZolam tablet 0.25 mg TID PRN Anxiety  Pain: left hip with movement-controlled     acetaminophen tablet 650 mg TID  methocarbamoL tablet 750 mg TID  acetaminophen tablet 650 mg q4hr PRN mild pain  oxyCODONE immediate release tablet 5 mg q4hr PRN mod pain     Bowels/Bladder: last BM 11/3 (loose, incontinent)    Appetite: decreased from baseline     Sleep: good          Physical Exam  General: well-developed, well-nourished, in no acute distress  Eye: EOMI, clear conjunctiva, eyelids normal  HENT: normocephalic, oropharynx and nasal mucosal surfaces moist  Neck: full range of motion, supple  Respiratory: equal chest rise, no SOB, no audible wheeze  Cardiovascular: regular rate and rhythm, no edema  Gastrointestinal: soft, non-tender, non-distended   Musculoskeletal:  decreased ROM/strength to LLE  Integumentary: no rashes or skin lesions present, Left hip incisions x 4-staples, dressings-drainage saturated dressing   Neurologic: cranial nerves intact, no signs of peripheral neurological deficit, motor/sensory function intact  *MD performed and documented physical examination     Labs:   Latest Reference Range & Units 11/03/22 09:18 11/04/22 05:35   WBC 4.5 - 11.5 x10(3)/mcL  8.1   RBC 4.20 - 5.40 x10(6)/mcL  2.33 (L)   Hemoglobin 12.0 - 16.0 gm/dL  7.0 (L)   Hematocrit 37.0 - 47.0 %  22.0 (L)   MCV 80.0 - 94.0 fL  94.4 (H)   MCH 27.0 - 31.0 pg  30.0   MCHC 33.0 - 36.0 mg/dL  31.8 (L)   RDW 11.5 - 17.0 %  14.5   Platelets 130 - 400 x10(3)/mcL  145   MPV 7.4 - 10.4 fL  10.6 (H)   Neut % %  72.9   LYMPH % %  14.3   Mono % %  11.6   Eosinophil % %  0.6   Basophil % %  0.1   Immature Granulocytes %  0.5   Neut # 2.1 - 9.2 x10(3)/mcL  5.9   Lymph # 0.6 - 4.6 x10(3)/mcL  1.16   Mono # 0.1 - 1.3 x10(3)/mcL  0.94   Eos # 0 - 0.9 x10(3)/mcL  0.05   Baso # 0 - 0.2 x10(3)/mcL  0.01   Immature Grans (Abs) 0 - 0.04 x10(3)/mcL  0.04   nRBC %  0.0   Iron 50 - 170 ug/dL  23 (L)   TIBC 250 - 450 ug/dL  141 (L)   Iron Binding Capacity Unsaturated 70 - 310 ug/dL  118   Transferrin mg/dL  128   Ferritin 4.63 - 204.00 ng/mL  199.29   Iron Saturation 20 - 50 %  16 (L)   Sodium 136 - 145 mmol/L  137   Potassium 3.5 - 5.1 mmol/L  3.9   Chloride 98 - 107 mmol/L  106   CO2 23 - 31 mmol/L  23   BUN 9.8 - 20.1 mg/dL  41.5 (H)   Creatinine 0.55 - 1.02 mg/dL  0.80   eGFR mls/min/1.73/m2  >60   Glucose 82 - 115 mg/dL  140 (H)   Calcium 8.4 - 10.2 mg/dL  8.3 (L)   Phosphorus 2.3 - 4.7 mg/dL  2.4   Magnesium 1.60 - 2.60 mg/dL  2.50   Alkaline Phosphatase 40 - 150 unit/L  73   PROTEIN TOTAL 5.8 - 7.6 gm/dL  4.9 (L)   Albumin 3.4 - 4.8 gm/dL  2.7 (L)   Albumin/Globulin Ratio 1.1 - 2.0 ratio  1.2   Prealbumin 14.0 - 37.0 mg/dL  9.7 (L)   BILIRUBIN TOTAL <=1.5 mg/dL  1.4   AST 5 - 34 unit/L  27   ALT 0 - 55 unit/L   13   Globulin, Total 2.4 - 3.5 gm/dL  2.2 (L)   Group & Rh   A NEG   Indirect Ke GEL   NEG   PREPARE RBC SOFT   Rpt   ID NOW COVID-19, (JOSEPH) Negative  Negative    (L): Data is abnormally low  (H): Data is abnormally high  Rpt: View report in Results Review for more information    Diagnostics:  XR CHEST 1 VIEW  Impression:  No active pulmonary disease  Date:                                            10/31/2022  Time:                                           09:31    Three views pelvis and left hip  Impression:  Acute, displaced and comminuted left femoral neck intertrochanteric fracture  Date:                                            10/31/2022  Time:                                           13:07    Assessment/Plan  Hospital   Closed comminuted intertrochanteric fracture of left femur     Non-Hospital   Osteoporosis   Cardiac arrhythmia   Other pulmonary embolism without acute cor pulmonale   Chronic deep vein thrombosis (DVT) of distal vein of lower extremity   Low vitamin D level       Wounds: Left hip incisions x 4-staples, dressings-drainage saturated dressing   S/p left intertrochanteric femur fracture intramedullary nailing on 11/1 with Dr. Friend  Precautions: WBAT LLE  Bracing/AD: RW  Swallowing: Regular Diet  Function: Tolerating therapy. Continue PT/OT  VTE Prophylaxis: SCDs. Eliquis on Hold. Receiving Blood.   Code Status: FULL CODE   Discharge:Lives alone in the [place] in a single-story home with 4-5 steps and bilateral rails to enter the residence. She completed high school. No  history. She is a retired . Patient is . She lives alone and was completely independent. Pts son lives behind her. He states that they have good family support and will be able to assist her after rehab.    Children (4). Date pending.   Dos 11/4/22  I have evaluated the patient on initial IRF admit. I have been involved in rehab prescreen. I have reviewed records.I have reviewed admit  orders.I have discussed case with IM team.  I find the patient appropriate for, in need of and should tolerate an aggressive IRF program with good potential for functional improvement.  I am in agreement with initial Plan of Care  I have been involved in the creation of this initial PM&R consult with Dedra Ragland NP, conducted additional independent physical examination and assisted with medical documentation.

## 2022-11-04 NOTE — PT/OT/SLP EVAL
"Occupational Therapy Inpatient Rehab Evaluation    Name: Niecy Joshua  MRN: [id number]    Recommendations:     Discharge Recommendations:      Discharge Equipment Recommendations:     Barriers to discharge: Inaccessible home    Assessment:  Niecy Joshua is a 87 y.o. female admitted with a medical diagnosis of Closed comminuted intertrochanteric fracture of left femur.  She presents with the following impairments/functional limitations:  weakness, impaired endurance, impaired sensation, impaired self care skills, impaired functional mobility, gait instability, impaired balance, decreased upper extremity function, decreased lower extremity function, decreased coordination, decreased safety awareness, pain, impaired fine motor.    General Precautions: Standard, fall     Orthopedic Precautions:LLE weight bearing as tolerated     Braces: N/A    Rehab Prognosis: Good; patient would benefit from acute skilled OT services to address these deficits and reach maximum level of function.      History:     Past Medical History:   Diagnosis Date    Cardiac arrhythmia     Low vitamin D level     Osteoporosis     Other pulmonary embolism without acute cor pulmonale        Past Surgical History:   Procedure Laterality Date    BREAST MASS EXCISION      x3  benign    LAPAROSCOPIC REPAIR OF INGUINAL HERNIA Bilateral        Subjective     Orientation: Oriented x4    Chief Complaint: pain with movement    Patient/Family Comments/goals: "to be able to do what I used to"    Vitals  Vitals at Rest  BP     HR     O2 Sat     Pain Pain Rating 1: 4/10  Location - Side 1: Left  Location 1: hip  Pain Addressed 1: Reposition, Cessation of Activity  Pain Rating Post-Intervention 1: 0/10     Vitals With Activity  BP     HR     O2 Sat     Pain Pain Rating 1: 4/10  Location - Side 1: Left  Location 1: hip  Pain Addressed 1: Reposition, Cessation of Activity  Pain Rating Post-Intervention 1: 0/10     Respiratory Status: Room air    Patients " cultural, spiritual, Zoroastrianism conflicts given the current situation: no       Living Environment   Living Environment  Lives With: alone  Home Accessibility: stairs to enter home  Number of Stairs, Main Entrance: five  Stair Railings, Main Entrance: railings on both sides of stairs  Shower Setup: Walk-in shower    Prior Level of Function  BADL: Independent    IADL: Independent    Equipment used at home: none .  DME owned (not currently used): rolling walker and single point cane.      Upon discharge, patient will have assistance from family.    Objective:     Patient found up in chair with peripheral IV, see catheter  upon OT entry to room.    Mobility   Patient completed:  Sit to Stand Transfer with moderate assistance with rolling walker  Stand to Sit Transfer with minimum assistance with rolling walker  Toilet Transfer Stand Pivot technique with moderate assistance with  rolling walker  Tub Transfer Stand Pivot technique with moderate assistance with rolling walker    Functional Mobility   ADL transfers in bathroom. Pt managed 4-5 steps with RW with difficulty - Mod A    ADLs     Current Status   Eating 5   Oral Hygiene 5   Shower, Bathe Self 2 per pt report - sponge bath   Upper Body Dressing 5   Lower Body Dressing 2   Toileting Hygiene 3   Toilet Transfer 3   Putting On, Taking Off Footwear 2     Limiting Factors for ADLs: motor, endurance, limited ROM, balance, weakness, safety awareness, and pain    Exams     ROM:          -       WFL    Hand Dominance: Right    ROM Hand  Left Hand: WFL  Right Hand: WFL    Strength  Overall Strength:          -       WFL     Strength:   2-3/5 d/t arthritic deformities in B hands     Pinch Strength:   2/5 d/t arthritic deformities in B hands    Sensation  Left: diminished to light touch  Right: diminished to light touch    Coordination:   Gross UB coordination in larger joints WFL; FM coordination in hand impaired d/t arthritis.    Tone  Left: WNL  Right:  WNL    Visual/Perceptual  Intact    Cognition:   WFL    Balance    Sitting  Sitting Surface: TTB  Static: No UE Support, Good (-)  Dynamic: One UE Extremity, Fair (-)    Standing  Static: Bilateral UE Extremity Support, Fair (-)  Dynamic: Bilateral UE extremity support, Poor (-)    Posture/Deviations  Kyphotic posture    Additional Treatments   Pt issued elastic shoe strings to ease difficulty with donning shoes    LifeStyle Change and Education     Patient left up in chair with all lines intact and call button in reach.    Education provided: Roles and goals of OT, ADLs, transfer training, bed mobility, body mechanics, assistive device, wheelchair precautions, sequencing, safety precautions, fall prevention, equipment recommendations, and home safety    Multidisciplinary Problems       Occupational Therapy Goals          Problem: Occupational Therapy    Goal Priority Disciplines Outcome Interventions   Occupational Therapy Goal     OT, PT/OT Ongoing, Progressing    Description: ADLs:  Pt to perform grooming tasks with set up and standing at the sink.  Pt to perform UB dressing with set up   Pt to perform LB dressing with Partial/mod assist with AE as needed.  Pt to perform putting on/off footwear task with Max assist with AE as needed  Pt to perform toileting with partial/mod assist    Functional Transfers:  Pt to perform toilet transfers with partial/mod assist and RW  Pt to perform a tub transfer with partial/mod assist and RW/TTB  Pt to perform a walk-in shower transfer with partial/mod assist and RW    IADLs:  Pt to perform simple meal prep with partial/mod assist with RW     Balance, Strengthening, Endurance, Balance:  Pt to consistently demonstrate adherence to orthopedic precautions during all ADL's as instructed by OT.  Pt to demonstrate fair dynamic standing balance as required to perform ADL's from standing level.  Pt to demonstrate good BUE strength during functional task   Pt to demonstrate consistent  adherence to breathing control and energy conservation techniques as educated by OT.                        Plan     During this hospitalization, patient to be seen 5 x/week to address the identified rehab impairments via self-care/home management, therapeutic activities, therapeutic exercises and progress toward the following goals:    Plan of Care Expires:  11/10/22    Time Tracking     OT Received On: 11/04/22  Time In 0730     Time Out 0830  Total Time 60 min  Therapy Time: OT Individual: 60  Missed Time:    Missed Time Reason:      Billable Minutes: Evaluation 30 and Self Care/Home Management 30    11/04/2022

## 2022-11-04 NOTE — PROGRESS NOTES
"   11/04/22 1030   Rec Therapy Time Calculation   Date of Treatment 11/04/22   Rec Start Time 1030   Rec Stop Time 1100   Rec Total Time (min) 30 min   Time   Treatment time 2 units   Precautions   General Precautions fall   Orthopedic Precautions  LLE weight bearing as tolerated   Braces N/A   Pain/Comfort   Pain Rating 1 no pain   Vital Signs   Pulse 81   /70   OTHER   Treatment Diagnosis Fall, L intertrochanteric femur fx, IMN, DVT, osteoporosis, vitamin D deficiency,   Rehab identified problem list/impairments weakness;impaired endurance;impaired functional mobility;gait instability;decreased lower extremity function;decreased safety awareness   Values/Beliefs/Spiritual Care   Spiritual, Cultural Beliefs, Yarsanism Practices, Values that Affect Care no   Prior Living/ADLs   Admit Date 11/03/22   Lives With alone   Living Arrangements house   Patient responsibilites: ;Financial management;Health and wellness;Laundry;Leisure/play/hobbies;Meal preparation;Retired;Shopping;Yard Work   Number of Children 4   Occupation Retired:    Previous Hand Domiance Right   Current Hand Dominance Right   Overall Level of Functioning   Activity Tolerance Standby Assist   Dynamic Sitting Balance/Reaching Does not occur   Dynamic Standing Balance/Reaching Standby Assist   Right UE Coodination/Dexterity Independent   Left UE Coordination/Dexterity Independent   Problem Solving/Sequencing Skills Independent   Memory Recall Independent   R/L Neglect/Inattention Does not occur   Attention Span Mod Indep   Patient Goals   Patient Goal 1 "To be able to walk."   Recreational Therapy Short Term Goals   Short Term Goal 1 Will increase sit to stand to supervision   Short Term Goal 1 Progression Initiated   Short Term Goal 2 Will improve dynamic standing balance/reaching to supervision   Short Term Goal 2 Progression Initiated   Recreational Therapy Long Term Goals   Long Term Goal 1 Will increase standing tolerance " to 5 mintues   Long Term Goal 1 Progression Initiated   Long Term Goal 2 Will improve dynamic standing balance/reaching to setup   Long Term Goal 2 Progression Initiated   Plan   Patient to be seen Daily   Planned Duration 2 weeks   Treatments Planned Coordination;Energy conservation training;Safety education   Treatment plan/goals estblished with Patient/Caregiver Yes

## 2022-11-04 NOTE — CONSULTS
Inpatient Nutrition Evaluation    Admit Date: 11/3/2022   Total duration of encounter: 1 day    Nutrition Recommendation/Prescription     Continue current diet as tolerated   2.  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 b.i.d., normocytic anemia, GERD, vitamin-D deficiency, osteoporosis    Nutrition-Related Medications: lasix, pantoprazole     Nutrition-Related Labs:  11/4: Iron 23(L), TIBC 141(L), Iron Sat 16(L), Gluc 140(H), BUN 41.5(H), Ca 8.3(L), Alb 2.7(L), PAB 9.7(L)     Diet Order: Diet Adult Regular  Oral Supplement Order: none  Appetite/Oral Intake: fair/50-75% of meals  Factors Affecting Nutritional Intake: none identified  Food/Moravian/Cultural Preferences: none reported  Food Allergies: none reported    Skin Integrity: bruised (ecchymotic)  Wound(s):  Incision     Comments    11/4:  Pt reports fair appetite, eating ~50% meals. Tells me is decreased from usual. Having loose stools-colace being held per NP note. No meals documented per EMR at this time. Offered pt ONS to assist her in meeting est needs. Pt politely declines. Encouraged protein intake at meals. Noted low PAB - likely d/t inflammation s/p surgery. Will monitor. Denies any recent wt loss. Tells me has UBW of 160 lbs. Per wt hx EMR, has wt of 165 lbs on (10/31).     Anthropometrics    11/4: No new wt, needs new wt     BMI Classification: overweight (BMI 25-29.9)    Usual Weight Provided By: patient 157 lbs     Wt Readings from Last 5 Encounters:   10/31/22 74.8 kg (165 lb)   08/09/22 73.6 kg (162 lb 3.2 oz)     Weight Change(s) Since Admission:  Admit  No admit wt at this time     Estimated/Assessed Needs     Weight Used For Calorie Calculations: 74.8 kg   Energy Calorie  Requirements (kcal): 1472 kcal/day  Energy Need Method: MSJ x 1.2 SF  Weight Used For Protein Calculations: 74.8 kg   Protein Requirements: 89 g  Protein Need Method:  1.2 g/kg   Fluid Requirements (mL): 1870  Estimated Fluid Requirement Method: 25 mL/kg     Patient Education    Not applicable.    Monitoring & Evaluation     Dietitian will monitor food and beverage intake, weight, and weight change.  Nutrition Risk/Follow-Up: low (follow-up in 5-7 days)  Patients assigned 'low nutrition risk' status do not qualify for a full nutritional assessment but will be monitored and re-evaluated in a 5-7 day time period. Please consult if re-evaluation needed sooner.

## 2022-11-04 NOTE — PLAN OF CARE
Problem: Occupational Therapy  Goal: Occupational Therapy Goal  Description: ADLs:  Pt to perform grooming tasks with set up and standing at the sink.  Pt to perform UB dressing with set up   Pt to perform LB dressing with Partial/mod assist with AE as needed.  Pt to perform putting on/off footwear task with Max assist with AE as needed  Pt to perform toileting with partial/mod assist    Functional Transfers:  Pt to perform toilet transfers with partial/mod assist and RW  Pt to perform a tub transfer with partial/mod assist and RW/TTB  Pt to perform a walk-in shower transfer with partial/mod assist and RW    IADLs:  Pt to perform simple meal prep with partial/mod assist with RW     Balance, Strengthening, Endurance, Balance:  Pt to consistently demonstrate adherence to orthopedic precautions during all ADL's as instructed by OT.  Pt to demonstrate fair dynamic standing balance as required to perform ADL's from standing level.  Pt to demonstrate good BUE strength during functional task   Pt to demonstrate consistent adherence to breathing control and energy conservation techniques as educated by OT.   Outcome: Ongoing, Progressing

## 2022-11-04 NOTE — PLAN OF CARE
Problem: Fall Injury Risk  Goal: Absence of Fall and Fall-Related Injury  Outcome: Ongoing, Progressing  Intervention: Promote Injury-Free Environment  Flowsheets (Taken 11/4/2022 0025)  Safety Promotion/Fall Prevention:   assistive device/personal item within reach   Fall Risk signage in place   lighting adjusted   nonskid shoes/socks when out of bed   side rails raised x 3   instructed to call staff for mobility

## 2022-11-04 NOTE — PLAN OF CARE
Niecy Joshua age 87 *WBAT LLE, ROMAT     Dx: Left intertrochanteric femur fracture s/p IM nailing 11/01/2022. Pt. Has a history of DVT, osteoporosis, and vitamin D deficiency. *See chart for full medical history     Hx mental health/substance abuse: No history of mental health or substance abuse issues.      Is there evidence of an acute change in mental status from the patients baseline? No     Insurance: Medicare Part A&B/[employer]     Education//Work Hx: She completed high school. No  history. She is a retired .     Patient is . She lives alone and was completely independent. Pts son Lukas lives behind her. He states that they have good family support and will be able to assist her after rehab.      Children (4) Friends/Family: 1) Lukas Joshua, son ([phone]) 2) Marya Gaytan, daughter ([phone])      Power of  (yes-Lukas and Marya)/Living Will (yes)     Prior Level of Fx:  Independent ADLs, drives, cooks, does chores, does laundry, grocery shops, manages finances, manages own meds, and does yard work. She does not have a medic alert.      Residence: Pt. Lives alone in the [place] in a single-story home with 4-5 steps and bilateral rails to enter the residence. She uses a walk-in shower with a threshold and HHS. No grab bars. She has a small built-in seat in the shower. She does not have an elevated toilet. No grab bars.      DME: RW, HHS, cane. Pt. Will use [facility] for DME.     HH/OP tx: Pts granddaughter is a Physical therapy. She works PRN for our rehab. She would like to consult her for HH choices.      Pharmacy: [facility] in [place]/ (has prescription drug coverage)      FOC obtained for [facility] for DME.     MD(s): PCP: Dr. Meza ([phone]); Ortho: Dr. Florentino Friend ([phone]) Appointment 11/28/2022 10:15 AM for suture removal and wound re-check.

## 2022-11-04 NOTE — PT/OT/SLP PROGRESS
"Occupational Therapy Inpatient Rehab Treatment    Name: Niecy Joshua  MRN: [id number]    Assessment:  Niecy Joshua is a 87 y.o. female admitted with a medical diagnosis of Closed comminuted intertrochanteric fracture of left femur.  She presents with the following impairments/functional limitations:  weakness, impaired endurance, impaired self care skills, impaired functional mobility (hearing deficits) .    General Precautions: Standard, fall     Orthopedic Precautions:LLE weight bearing as tolerated     Braces: N/A    Rehab Prognosis: Good; patient would benefit from acute skilled OT services to address these deficits and reach maximum level of function.      History:     Past Medical History:   Diagnosis Date    Cardiac arrhythmia     Low vitamin D level     Osteoporosis     Other pulmonary embolism without acute cor pulmonale        Past Surgical History:   Procedure Laterality Date    BREAST MASS EXCISION      x3  benign    LAPAROSCOPIC REPAIR OF INGUINAL HERNIA Bilateral        Subjective     Orientation: Oriented x4    Chief Complaint: "It's hard for me to get up; my hip hurts" OT placed ice pack on L hip x's 20 min c + relief    Patient/Family Comments/goals: "To get better; go home"     Vitals   Vitals at Rest  /70   HR 81   O2 Sat    Pain 5/10     Vitals With Activity  BP    HR    O2 Sat    Pain 3/10     Respiratory Status: Room air    Patients cultural, spiritual, Anglican conflicts given the current situation: no       Objective:     Patient found up in chair with peripheral IV  upon OT entry to room.    Mobility   Patient completed:  Sit to Stand Transfer with moderate assistance with rolling walker  Stand to Sit Transfer with minimum assistance with rolling walker and v/c's for hand placement    Functional Mobility  Pt. Unable to perform transfer to BSC 2* increased pain    ADLs   Current Status   Eating     Oral Hygiene     Shower, Bathe Self     Upper Body Dressing     Lower Body " Dressing     Toileting Hygiene     Toilet Transfer     Putting On, Taking Off Footwear 3     Limiting Factors for ADLs: endurance, weakness, and pain        Therapeutic Exercise  B UE AROM on UBE at 1.5 cisneros while seated in w/c x's 6 min; Pt. Educated/performed B UE AROM c yellow (light resistance) T band through all joints/planes x's 20 reps each c rest breaks. Pt. Performed w/c push ups c Min A x's 5 reps c rest breaks.         Additional Treatments: Pt. Educated on AE for LB DSG and performed donning/doffing R sock and shoe c Mod A. Pt. C elastic laces will need additional practice.     LifeStyle Change and Education:             Patient left up in chair with call button in reach and SHERITA Flanagan present. Pt. C excessive drainage from L hip incision. OT alerted SHERITA Flanagan and assisted Pt. In standing tolerance (Min A) as RN re-dressed incision.     Education provided: Roles and goals of OT, ADLs, body mechanics, assistive device, wheelchair precautions, and fall prevention    Multidisciplinary Problems       Occupational Therapy Goals          Problem: Occupational Therapy    Goal Priority Disciplines Outcome Interventions   Occupational Therapy Goal     OT, PT/OT Ongoing, Progressing    Description: ADLs:  Pt to perform grooming tasks with set up and standing at the sink.  Pt to perform UB dressing with set up   Pt to perform LB dressing with Partial/mod assist with AE as needed.  Pt to perform putting on/off footwear task with Max assist with AE as needed  Pt to perform toileting with partial/mod assist    Functional Transfers:  Pt to perform toilet transfers with partial/mod assist and RW  Pt to perform a tub transfer with partial/mod assist and RW/TTB  Pt to perform a walk-in shower transfer with partial/mod assist and RW    IADLs:  Pt to perform simple meal prep with partial/mod assist with RW     Balance, Strengthening, Endurance, Balance:  Pt to consistently demonstrate adherence to orthopedic precautions  during all ADL's as instructed by OT.  Pt to demonstrate fair dynamic standing balance as required to perform ADL's from standing level.  Pt to demonstrate good BUE strength during functional task   Pt to demonstrate consistent adherence to breathing control and energy conservation techniques as educated by OT.                        Time Tracking     OT Received On: 11/04/22  Time In 1100     Time Out 1200  Total Time 60 min  Therapy Time: OT Individual: 60  Missed Time:  0  Missed Time Reason:      Billable Minutes: Self Care/Home Management 20 and Therapeutic Exercise 40    11/04/2022

## 2022-11-04 NOTE — PLAN OF CARE
PHQ-2 completed (score=0). Negative.    Discussed discharge planning, case management for her needs, etc.  Offered encouragement and support as she needs.

## 2022-11-05 LAB
ANION GAP SERPL CALC-SCNC: 6 MEQ/L
BASOPHILS # BLD AUTO: 0.01 X10(3)/MCL (ref 0–0.2)
BASOPHILS NFR BLD AUTO: 0.1 %
BUN SERPL-MCNC: 37.8 MG/DL (ref 9.8–20.1)
CALCIUM SERPL-MCNC: 8.3 MG/DL (ref 8.4–10.2)
CHLORIDE SERPL-SCNC: 107 MMOL/L (ref 98–107)
CO2 SERPL-SCNC: 26 MMOL/L (ref 23–31)
CREAT SERPL-MCNC: 0.8 MG/DL (ref 0.55–1.02)
CREAT/UREA NIT SERPL: 47
DEPRECATED CALCIDIOL+CALCIFEROL SERPL-MC: 32 NG/ML (ref 30–80)
EOSINOPHIL # BLD AUTO: 0.16 X10(3)/MCL (ref 0–0.9)
EOSINOPHIL NFR BLD AUTO: 2.1 %
ERYTHROCYTE [DISTWIDTH] IN BLOOD BY AUTOMATED COUNT: 19.3 % (ref 11.5–17)
GFR SERPLBLD CREATININE-BSD FMLA CKD-EPI: >60 MLS/MIN/1.73/M2
GLUCOSE SERPL-MCNC: 115 MG/DL (ref 82–115)
HCT VFR BLD AUTO: 29.5 % (ref 37–47)
HGB BLD-MCNC: 9.8 GM/DL (ref 12–16)
IMM GRANULOCYTES # BLD AUTO: 0.04 X10(3)/MCL (ref 0–0.04)
IMM GRANULOCYTES NFR BLD AUTO: 0.5 %
LYMPHOCYTES # BLD AUTO: 1.78 X10(3)/MCL (ref 0.6–4.6)
LYMPHOCYTES NFR BLD AUTO: 22.9 %
MAGNESIUM SERPL-MCNC: 2.2 MG/DL (ref 1.6–2.6)
MCH RBC QN AUTO: 28.7 PG (ref 27–31)
MCHC RBC AUTO-ENTMCNC: 33.2 MG/DL (ref 33–36)
MCV RBC AUTO: 86.5 FL (ref 80–94)
MONOCYTES # BLD AUTO: 0.98 X10(3)/MCL (ref 0.1–1.3)
MONOCYTES NFR BLD AUTO: 12.6 %
NEUTROPHILS # BLD AUTO: 4.8 X10(3)/MCL (ref 2.1–9.2)
NEUTROPHILS NFR BLD AUTO: 61.8 %
NRBC BLD AUTO-RTO: 0 %
PLATELET # BLD AUTO: 155 X10(3)/MCL (ref 130–400)
PMV BLD AUTO: 9.8 FL (ref 7.4–10.4)
POTASSIUM SERPL-SCNC: 3.9 MMOL/L (ref 3.5–5.1)
RBC # BLD AUTO: 3.41 X10(6)/MCL (ref 4.2–5.4)
SODIUM SERPL-SCNC: 139 MMOL/L (ref 136–145)
WBC # SPEC AUTO: 7.8 X10(3)/MCL (ref 4.5–11.5)

## 2022-11-05 PROCEDURE — 80048 BASIC METABOLIC PNL TOTAL CA: CPT | Performed by: NURSE PRACTITIONER

## 2022-11-05 PROCEDURE — 11800000 HC REHAB PRIVATE ROOM

## 2022-11-05 PROCEDURE — 36415 COLL VENOUS BLD VENIPUNCTURE: CPT | Performed by: NURSE PRACTITIONER

## 2022-11-05 PROCEDURE — 85025 COMPLETE CBC W/AUTO DIFF WBC: CPT | Performed by: NURSE PRACTITIONER

## 2022-11-05 PROCEDURE — 94799 UNLISTED PULMONARY SVC/PX: CPT

## 2022-11-05 PROCEDURE — 25000003 PHARM REV CODE 250: Performed by: NURSE PRACTITIONER

## 2022-11-05 PROCEDURE — 94761 N-INVAS EAR/PLS OXIMETRY MLT: CPT

## 2022-11-05 PROCEDURE — 63600175 PHARM REV CODE 636 W HCPCS: Performed by: NURSE PRACTITIONER

## 2022-11-05 PROCEDURE — 83735 ASSAY OF MAGNESIUM: CPT | Performed by: NURSE PRACTITIONER

## 2022-11-05 PROCEDURE — 82306 VITAMIN D 25 HYDROXY: CPT | Performed by: NURSE PRACTITIONER

## 2022-11-05 RX ORDER — POTASSIUM CHLORIDE 20 MEQ/1
40 TABLET, EXTENDED RELEASE ORAL ONCE
Status: COMPLETED | OUTPATIENT
Start: 2022-11-05 | End: 2022-11-05

## 2022-11-05 RX ORDER — FUROSEMIDE 10 MG/ML
60 INJECTION INTRAMUSCULAR; INTRAVENOUS ONCE
Status: COMPLETED | OUTPATIENT
Start: 2022-11-05 | End: 2022-11-05

## 2022-11-05 RX ORDER — BISACODYL 10 MG
10 SUPPOSITORY, RECTAL RECTAL ONCE
Status: COMPLETED | OUTPATIENT
Start: 2022-11-05 | End: 2022-11-05

## 2022-11-05 RX ADMIN — PANTOPRAZOLE SODIUM 40 MG: 40 TABLET, DELAYED RELEASE ORAL at 08:11

## 2022-11-05 RX ADMIN — METHOCARBAMOL 750 MG: 750 TABLET ORAL at 05:11

## 2022-11-05 RX ADMIN — ACETAMINOPHEN 650 MG: 325 TABLET, FILM COATED ORAL at 08:11

## 2022-11-05 RX ADMIN — DOCUSATE SODIUM 100 MG: 100 CAPSULE, LIQUID FILLED ORAL at 08:11

## 2022-11-05 RX ADMIN — METHOCARBAMOL 750 MG: 750 TABLET ORAL at 10:11

## 2022-11-05 RX ADMIN — MUPIROCIN: 20 OINTMENT TOPICAL at 08:11

## 2022-11-05 RX ADMIN — METHOCARBAMOL 750 MG: 750 TABLET ORAL at 07:11

## 2022-11-05 RX ADMIN — OXYCODONE HYDROCHLORIDE 5 MG: 5 TABLET ORAL at 08:11

## 2022-11-05 RX ADMIN — METHOCARBAMOL 750 MG: 750 TABLET ORAL at 12:11

## 2022-11-05 RX ADMIN — FUROSEMIDE 60 MG: 10 INJECTION, SOLUTION INTRAMUSCULAR; INTRAVENOUS at 10:11

## 2022-11-05 RX ADMIN — ACETAMINOPHEN 650 MG: 325 TABLET, FILM COATED ORAL at 02:11

## 2022-11-05 RX ADMIN — POTASSIUM CHLORIDE 40 MEQ: 1500 TABLET, EXTENDED RELEASE ORAL at 10:11

## 2022-11-05 RX ADMIN — POLYETHYLENE GLYCOL 3350 17 G: 17 POWDER, FOR SOLUTION ORAL at 08:11

## 2022-11-05 RX ADMIN — ACETAMINOPHEN 650 MG: 325 TABLET, FILM COATED ORAL at 06:11

## 2022-11-05 RX ADMIN — BISACODYL 10 MG: 10 SUPPOSITORY RECTAL at 02:11

## 2022-11-05 NOTE — PROGRESS NOTES
[facility] ([facility])  Rehab Progress Note    Patient Name: Niecy Joshua  MRN: [id number]  Age: 87 y.o. Sex: female  : 1935  Hospital Length of Stay: 2 days  Date of Service: 2022   Chief Complaint: Left hip fracture s/p IMN on 2022    Subjective:     Basic Information  Admit Information: 87-year-old white female presented to [facility] ED on 10/31/2022 complaining of left hip pain after falling from ground level.  PMH significant for unprovoked DVT diagnosed in  and osteoporosis.  Work up significant for hip fracture.  Tolerated left IMN on  without perioperative complications.  Orthopedic surgery recommended WBAT to RLE.  H&H continues to trend downward with hemoglobin 7.1 requiring 1 unit PRBC transfusion.  Tolerated transfer to [facility] inpatient rehab unit on  without incident.  Today's Information: No acute events overnight.  Sitting up in chair.  Reports good sleep and appetite.  Last BM .  Vital signs at goal with no recorded fevers.  H&H trending up.  BMP unremarkable.  BUN trending down.  No imaging today.    Review of patient's allergies indicates:  No Known Allergies     Current Facility-Administered Medications:     0.9%  NaCl infusion (for blood administration), , Intravenous, Q24H PRN, Luis Felipe A Julius, FNP    acetaminophen tablet 650 mg, 650 mg, Oral, Q4H PRN, Luis Felipe A Julius, FNP    acetaminophen tablet 650 mg, 650 mg, Oral, TID, Dilcia FUNES [place], FNP, 650 mg at 22 0659    ALPRAZolam tablet 0.25 mg, 0.25 mg, Oral, TID PRN, Luis Felipe A Julius, FNP    benzonatate capsule 100 mg, 100 mg, Oral, TID PRN, Luis Felipe A Julius, FNP    bisacodyL suppository 10 mg, 10 mg, Rectal, Daily PRN, Luis Felipe A Julius, FNP    docusate sodium capsule 100 mg, 100 mg, Oral, BID, Luis Felipe A Julius, FNP, 100 mg at 22 0851    hydrALAZINE injection 10 mg, 10 mg, Intravenous, Q4H PRN, Luis Felipe [place], FNP    hydrOXYzine pamoate capsule 50  mg, 50 mg, Oral, Nightly PRN, Luis Felipe A Julius, FNP    labetalol 20 mg/4 mL (5 mg/mL) IV syring, 10 mg, Intravenous, Q4H PRN, Luis Felipe A Julius, FNP    methocarbamoL tablet 750 mg, 750 mg, Oral, QID, Luis Felipe A Julius, FNP, 750 mg at 11/05/22 0700    metoprolol injection 10 mg, 10 mg, Intravenous, Q2H PRN, Luis Felipe A Julius, FNP    mupirocin 2 % ointment, , Nasal, BID, Luis Felipe A Julius, FNP, Given at 11/05/22 0851    nitroGLYCERIN SL tablet 0.4 mg, 0.4 mg, Sublingual, Q5 Min PRN, Luis Felipe A Julius, FNP    ondansetron disintegrating tablet 4 mg, 4 mg, Oral, Q6H PRN, Luis Felipe A Julius, FNP    oxyCODONE immediate release tablet 5 mg, 5 mg, Oral, Q4H PRN, Luis Felipe A Julius, FNP, 5 mg at 11/05/22 0851    pantoprazole EC tablet 40 mg, 40 mg, Oral, Daily, Luis Felipe A Julius, FNP, 40 mg at 11/05/22 0851    polyethylene glycol packet 17 g, 17 g, Oral, BID PRN, Luis Felipe A Julius, FNP    promethazine tablet 25 mg, 25 mg, Oral, Q6H PRN, Luis Felipe A Julius, FNP     Review of Systems   Complete 12-point review of symptoms negative except for what's mentioned in HPI     Objective:     /61   Pulse 73   Temp 98.1 °F (36.7 °C) (Oral)   Resp 20   Wt 76.4 kg (168 lb 6.9 oz)   SpO2 95%   BMI 26.38 kg/m²        Intake/Output Summary (Last 24 hours) at 11/5/2022 0910  Last data filed at 11/5/2022 0526  Gross per 24 hour   Intake 1375.83 ml   Output 2150 ml   Net -774.17 ml       Physical Exam  Constitutional:       Appearance: Normal appearance.   HENT:      Head: Normocephalic.      Mouth/Throat:      Mouth: Mucous membranes are moist.   Eyes:      Pupils: Pupils are equal, round, and reactive to light.   Cardiovascular:      Rate and Rhythm: Normal rate and regular rhythm.      Heart sounds: Normal heart sounds.      Comments: Regular rate with intermittent pauses.  LLE 1+ edema  Pulmonary:      Effort: Pulmonary effort is normal.      Breath sounds: Normal breath sounds.   Abdominal:      General:  Bowel sounds are normal.      Palpations: Abdomen is soft.   Genitourinary:     Comments: Indwelling catheter  Musculoskeletal:      Cervical back: Neck supple.      Comments: Generalized weakness and muscle atrophy.  Left hip dressing clean and intact-moderate swelling appreciated.    Skin:     General: Skin is warm and dry.   Neurological:      General: No focal deficit present.      Mental Status: She is alert and oriented to person, place, and time.   Psychiatric:         Mood and Affect: Mood normal.         Behavior: Behavior normal.         Thought Content: Thought content normal.         Judgment: Judgment normal.       Lines/Drains/Airways       Drain  Duration                  Urethral Catheter 11/03/22 1053 16 Fr. 1 day              Peripheral Intravenous Line  Duration                  Peripheral IV - Single Lumen 11/04/22 1220 Right Antecubital <1 day         Peripheral IV - Single Lumen 11/04/22 1700 Posterior;Right Hand <1 day                    Labs  Recent Results (from the past 24 hour(s))   Basic Metabolic Panel    Collection Time: 11/05/22  5:41 AM   Result Value Ref Range    Sodium Level 139 136 - 145 mmol/L    Potassium Level 3.9 3.5 - 5.1 mmol/L    Chloride 107 98 - 107 mmol/L    Carbon Dioxide 26 23 - 31 mmol/L    Glucose Level 115 82 - 115 mg/dL    Blood Urea Nitrogen 37.8 (H) 9.8 - 20.1 mg/dL    Creatinine 0.80 0.55 - 1.02 mg/dL    BUN/Creatinine Ratio 47     Calcium Level Total 8.3 (L) 8.4 - 10.2 mg/dL    Anion Gap 6.0 mEq/L    eGFR >60 mls/min/1.73/m2   Magnesium    Collection Time: 11/05/22  5:41 AM   Result Value Ref Range    Magnesium Level 2.20 1.60 - 2.60 mg/dL   Vitamin D    Collection Time: 11/05/22  5:41 AM   Result Value Ref Range    Vit D 25 OH 32.0 30.0 - 80.0 ng/mL   CBC with Differential    Collection Time: 11/05/22  5:41 AM   Result Value Ref Range    WBC 7.8 4.5 - 11.5 x10(3)/mcL    RBC 3.41 (L) 4.20 - 5.40 x10(6)/mcL    Hgb 9.8 (L) 12.0 - 16.0 gm/dL    Hct 29.5 (L) 37.0 -  47.0 %    MCV 86.5 80.0 - 94.0 fL    MCH 28.7 27.0 - 31.0 pg    MCHC 33.2 33.0 - 36.0 mg/dL    RDW 19.3 (H) 11.5 - 17.0 %    Platelet 155 130 - 400 x10(3)/mcL    MPV 9.8 7.4 - 10.4 fL    Neut % 61.8 %    Lymph % 22.9 %    Mono % 12.6 %    Eos % 2.1 %    Basophil % 0.1 %    Lymph # 1.78 0.6 - 4.6 x10(3)/mcL    Neut # 4.8 2.1 - 9.2 x10(3)/mcL    Mono # 0.98 0.1 - 1.3 x10(3)/mcL    Eos # 0.16 0 - 0.9 x10(3)/mcL    Baso # 0.01 0 - 0.2 x10(3)/mcL    IG# 0.04 0 - 0.04 x10(3)/mcL    IG% 0.5 %    NRBC% 0.0 %     Radiology  Left hip XR on 11/01/2022 at 9:34 a.m., IMPRESSION:Improved alignment following internal fixation of the left femur.    Assessment/Plan:     87 y.o. WF admitted on 11/3/2022    Left hip fracture   - s/p IMN on 11/01/2022  - WBAT to RLE  - staples to remove on 11/14/2022  - continue                Oxycodone 5 mg q.4 hours p.r.n.                 Robaxin 750 mg q.i.d.                 Tylenol 650 mg t.i.d.  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 hold 11/4 due to anemia     Normocytic anemia  - asymptomatic  - s/p transfusion   x2 units PRBC on 11/4/2022                x1 units PRBC on 11/3/2022  - H/H trending up  - no evidence of active bleeds  - will closely monitor and transfuse if needed      GERD  - Avoid spicy foods, and nothing to eat or drink within x2 hours of bedtime or laying flat (water is ok)   - Avoid NSAIDs (Advil, ibuprofen, naproxen...) and shankar-2 inhibitors (Mobic, Celebrex)    - continue                Protonix 40 mg daily      Vitamin D deficiency  - vitamin-D level 32.0 on 11/05  - continue                Vitamin-D 5000 units daily      Constipation  - last BM 11/1  - initiate   Dulcolax suppository x1 now  - continue  Colace 100 mg BID   Miralax 17 gm BID PRN     Urinary retention   - current   - Indwelling catheter reinserted 11/2   - plan to  discontinue indwelling catheter 11/9     VTE Prophylaxis:  Eliquis 5 mg b.i.d.-held 11/4 due to anemia  COVID-19 testing:  Negative on 11/03/2022  COVID-19 vaccination status:  Vaccinated (Moderna):  07/23/2021 and 08/20/2021, and received 2 boosters     POA: Yes  Living will: Yes  Contacts: Lukas Joshua (son) [phone]                     Marya Gaytan (daughter) [phone]     CODE STATUS: Full Code  Internal Medicine (attending): Cb Fall MD  Physiatry (consulting):  Toño Polk MD     OUTPATIENT PROVIDERS  PCP:  King Duvall MD  Orthopedic surgery:  Florentino Friend MD     DISPOSITION: Condition stable.  Sleep hygiene and appetite at goal.  Last BM 11/1.  Vital signs at goal with no recorded fevers.  H&H trending up.  BMP looks good.  Initiate Lasix 60 mg IVP x1 dose now.  Replace potassium.  Eliquis today.  Repeat lab work in the morning.  If H&H stable in a.m. resume Eliquis 5 mg b.i.d..  Initiate Dulcolax suppository x1 now.  Monitor closely.  Notify of acute changes.    Total time spent on this encounter including chart review and direct 1-on-1 patient interaction: 46 minutes   Over 50% of this time was spent in counseling and coordination of care

## 2022-11-06 PROBLEM — S72.142D CLOSED COMMINUTED INTERTROCHANTERIC FRACTURE OF LEFT FEMUR WITH ROUTINE HEALING: Status: ACTIVE | Noted: 2022-11-01

## 2022-11-06 LAB
ANION GAP SERPL CALC-SCNC: 8 MEQ/L
BUN SERPL-MCNC: 31.4 MG/DL (ref 9.8–20.1)
CALCIUM SERPL-MCNC: 7.9 MG/DL (ref 8.4–10.2)
CHLORIDE SERPL-SCNC: 108 MMOL/L (ref 98–107)
CO2 SERPL-SCNC: 25 MMOL/L (ref 23–31)
CREAT SERPL-MCNC: 0.74 MG/DL (ref 0.55–1.02)
CREAT/UREA NIT SERPL: 42
GFR SERPLBLD CREATININE-BSD FMLA CKD-EPI: >60 MLS/MIN/1.73/M2
GLUCOSE SERPL-MCNC: 116 MG/DL (ref 82–115)
HCT VFR BLD AUTO: 28.3 % (ref 37–47)
HGB BLD-MCNC: 9.2 GM/DL (ref 12–16)
MAGNESIUM SERPL-MCNC: 2 MG/DL (ref 1.6–2.6)
POTASSIUM SERPL-SCNC: 4.1 MMOL/L (ref 3.5–5.1)
SODIUM SERPL-SCNC: 141 MMOL/L (ref 136–145)

## 2022-11-06 PROCEDURE — 25000003 PHARM REV CODE 250: Performed by: NURSE PRACTITIONER

## 2022-11-06 PROCEDURE — 11800000 HC REHAB PRIVATE ROOM

## 2022-11-06 PROCEDURE — 63600175 PHARM REV CODE 636 W HCPCS: Performed by: NURSE PRACTITIONER

## 2022-11-06 PROCEDURE — 97116 GAIT TRAINING THERAPY: CPT | Mod: CQ

## 2022-11-06 PROCEDURE — 94799 UNLISTED PULMONARY SVC/PX: CPT

## 2022-11-06 PROCEDURE — 85018 HEMOGLOBIN: CPT | Performed by: NURSE PRACTITIONER

## 2022-11-06 PROCEDURE — 80048 BASIC METABOLIC PNL TOTAL CA: CPT | Performed by: NURSE PRACTITIONER

## 2022-11-06 PROCEDURE — 83735 ASSAY OF MAGNESIUM: CPT | Performed by: NURSE PRACTITIONER

## 2022-11-06 PROCEDURE — 99233 PR SUBSEQUENT HOSPITAL CARE,LEVL III: ICD-10-PCS | Mod: ,,, | Performed by: NURSE PRACTITIONER

## 2022-11-06 PROCEDURE — 99900035 HC TECH TIME PER 15 MIN (STAT)

## 2022-11-06 PROCEDURE — 94761 N-INVAS EAR/PLS OXIMETRY MLT: CPT

## 2022-11-06 PROCEDURE — 97110 THERAPEUTIC EXERCISES: CPT

## 2022-11-06 PROCEDURE — 97535 SELF CARE MNGMENT TRAINING: CPT

## 2022-11-06 PROCEDURE — 85014 HEMATOCRIT: CPT | Performed by: NURSE PRACTITIONER

## 2022-11-06 PROCEDURE — 36415 COLL VENOUS BLD VENIPUNCTURE: CPT | Performed by: NURSE PRACTITIONER

## 2022-11-06 PROCEDURE — 99233 SBSQ HOSP IP/OBS HIGH 50: CPT | Mod: ,,, | Performed by: NURSE PRACTITIONER

## 2022-11-06 PROCEDURE — 97530 THERAPEUTIC ACTIVITIES: CPT

## 2022-11-06 RX ORDER — FUROSEMIDE 10 MG/ML
40 INJECTION INTRAMUSCULAR; INTRAVENOUS ONCE
Status: COMPLETED | OUTPATIENT
Start: 2022-11-06 | End: 2022-11-06

## 2022-11-06 RX ADMIN — FUROSEMIDE 40 MG: 10 INJECTION, SOLUTION INTRAMUSCULAR; INTRAVENOUS at 07:11

## 2022-11-06 RX ADMIN — METHOCARBAMOL 750 MG: 750 TABLET ORAL at 05:11

## 2022-11-06 RX ADMIN — PANTOPRAZOLE SODIUM 40 MG: 40 TABLET, DELAYED RELEASE ORAL at 07:11

## 2022-11-06 RX ADMIN — ACETAMINOPHEN 650 MG: 325 TABLET, FILM COATED ORAL at 08:11

## 2022-11-06 RX ADMIN — ACETAMINOPHEN 650 MG: 325 TABLET, FILM COATED ORAL at 05:11

## 2022-11-06 RX ADMIN — POLYETHYLENE GLYCOL 3350 17 G: 17 POWDER, FOR SOLUTION ORAL at 07:11

## 2022-11-06 RX ADMIN — OXYCODONE HYDROCHLORIDE 5 MG: 5 TABLET ORAL at 03:11

## 2022-11-06 RX ADMIN — ACETAMINOPHEN 650 MG: 325 TABLET, FILM COATED ORAL at 01:11

## 2022-11-06 RX ADMIN — DOCUSATE SODIUM 100 MG: 100 CAPSULE, LIQUID FILLED ORAL at 08:11

## 2022-11-06 RX ADMIN — MUPIROCIN: 20 OINTMENT TOPICAL at 08:11

## 2022-11-06 RX ADMIN — MUPIROCIN: 20 OINTMENT TOPICAL at 07:11

## 2022-11-06 RX ADMIN — METHOCARBAMOL 750 MG: 750 TABLET ORAL at 12:11

## 2022-11-06 RX ADMIN — OXYCODONE HYDROCHLORIDE 5 MG: 5 TABLET ORAL at 07:11

## 2022-11-06 RX ADMIN — DOCUSATE SODIUM 100 MG: 100 CAPSULE, LIQUID FILLED ORAL at 07:11

## 2022-11-06 RX ADMIN — POLYETHYLENE GLYCOL 3350 17 G: 17 POWDER, FOR SOLUTION ORAL at 08:11

## 2022-11-06 NOTE — PT/OT/SLP PROGRESS
"Physical Therapy Inpatient Rehab Treatment    Patient Name:  Niecy Joshua   MRN:  [id number]    Recommendations:     Discharge Recommendations:    pending pt's progress  Discharge Equipment Recommendations: walker, rolling   Barriers to discharge:  fall risk, decreased endurance, increased weakness    Assessment:     Niecy Joshua is a 87 y.o. female admitted with a medical diagnosis of Closed comminuted intertrochanteric fracture of left femur with routine healing.  She presents with the following impairments/functional limitations:  weakness, impaired endurance, impaired functional mobility, impaired self care skills, gait instability, pain, decreased ROM, edema.    Rehab Diagnosis: L femur fx s/p IM nail    Recent Surgery: * No surgery found *      General Precautions: Standard, fall     Orthopedic Precautions:LLE weight bearing as tolerated     Braces: N/A    Rehab Prognosis: Fair; patient would benefit from acute skilled PT services to address these deficits and reach maximum level of function.      History:     Past Medical History:   Diagnosis Date    Cardiac arrhythmia     Low vitamin D level     Osteoporosis     Other pulmonary embolism without acute cor pulmonale        Past Surgical History:   Procedure Laterality Date    BREAST MASS EXCISION      x3  benign    LAPAROSCOPIC REPAIR OF INGUINAL HERNIA Bilateral        Subjective     Chief Complaint: "I am just tired today."    Respiratory Status: Room air    Patients cultural, spiritual, Mu-ism conflicts given the current situation: no      Objective:     Communicated with RN prior to session.  Patient found up in chair with peripheral IV, see catheter  upon PT entry to room.    Pt is Oriented x3 and Alert, Cooperative, and Motivated.    Vitals   Vitals at Rest  BP    HR    O2 Sat    Pain      Vitals With Activity  BP    HR    O2 Sat    Pain        Functional Mobility:   Transfers:     Sit to Stand: Supervision or touching assistance  with in " //bars ( x4 trials)  Wheelchair Propulsion:  Pt propelled Standard wheelchair x 75 feet on Level tile with  Bilateral upper extremity with Supervision or touching assistance .  (Displayed increased L steering and verbal cueing for proper propulsion and turning techniques.)     Current   Status  Discharge   Goal   Functional Area: Care Score:    Roll Left and Right   Independent   Sit to Lying   Independent   Lying to Sitting on Side of Bed   Independent   Sit to Stand 3 Independent   Chair/Bed-to-Chair Transfer 3 Independent   Car Transfer   Supervision or touching assistance   Walk 10 Feet 3 Independent   Walk 50 Feet with Two Turns   Supervision or touching assistance   Walk 150 Feet   Supervision or touching assistance   Walk 10 Feet Uneven Surface   Supervision or touching assistance   1 Step (Curb)   Supervision or touching assistance   4 Steps   Supervision or touching assistance   12 Steps   Partial/moderate assistance   Picking Up Object   Independent   Wheel 50 Feet with Two Turns 4 Independent   Wheel 150 Feet   Independent       Therapeutic Activities and Exercises:  Pt performed:     seated  in W/C LAQ exercises on R and L for 3 set of 10 reps with increased assistance on L LE with ephesus on eccentric control of L quad. (Required rest break for increased time between sets due to increased fatigue.)    Seated in W/C L LE quad sets for 3 set of 10 reps (required verbal and tactile cueing in order to isolate L quad musculature.)    Standing marches in // bars with Mod A and B UE support for weight shifting: 3 set of 10 reps (required seated rest break between sets due to increased fatigue.) (displayed decreased R and L LE hip flexion possibly due to increased weakness.)    Activity Tolerance: Fair    Patient left up in chair with all lines intact, call button in reach, RN notified, and RN and pt's family present.    Education provided: roles and goals of PT/PTA, transfer training, strengthening exercises,  and fall prevention    Expected compliance: High compliance    GOALS:   Multidisciplinary Problems       Physical Therapy Goals          Problem: Physical Therapy    Goal Priority Disciplines Outcome Goal Variances Interventions   Physical Therapy Goal     PT, PT/OT Ongoing, Progressing     Description: Bed Mobility:  Roll left and right with supervision/touching assist.  Sit to supine transfer with supervision/touching assist.  Supine to sit transfer with supervision/touching assist.    Transfers:  Sit to stand transfer with supervision/touching assist using RW.  Bed to chair transfer with supervision/touching assist using RW.  Car transfer with partial/moderate assist using RW.   an object from the ground in standing position with supervision/touching assist using RW and reacher.    Mobility:  Ambulate 50 feet with supervision/touching assist using RW.  Ambulate 15 feet on uneven surfaces/ramps with partial/moderate assist using RW.  Ascend/descend a 4 inch curb with partial/moderate assist using RW.   Ascend/descend 5 stairs with partial/moderate assist using left handrail.                         Plan:     During this hospitalization, patient to be seen daily (5-7x/week) to address the identified rehab impairments via gait training, therapeutic exercises, therapeutic activities, wheelchair management/training and progress toward the following goals:    Plan of Care Expires:  11/10/22  PT Next Visit Date: 11/10/22  Plan of Care reviewed with: patient    Additional Information:         Time Tracking:     Therapy Time  PT Received On: 11/06/22  PT Start Time: 1255  PT Stop Time: 1325  PT Total Time (min): 30 min   PT Individual: 30      Billable Minutes: Therapeutic Activity 16 and Therapeutic Exercise 14    11/06/2022

## 2022-11-06 NOTE — PHYSICIAN QUERY
PT Name: Niecy Joshua  MR #: [id number]    DOCUMENTATION CLARIFICATION      CDS/: ABHINAV Ackerman, RN               Contact information:[email or web address]    This form is a permanent document in the medical record.      Query Date: November 5, 2022    By submitting this query, we are merely seeking further clarification of documentation. Please utilize your independent clinical judgment when addressing the question(s) below.    The Medical Record contains the following:   Indicators  Supporting Clinical Findings Location in Medical Record    x Anemia documented  Postoperative anemia requiring blood transfusion    Hospital Medicine Discharge Summary 11/3/2022      H&H      x BP                    HR BP: ()/(58-76)               Pulse:  []  Orthopedic Surgery Progress Notes 11/3/2022    GI bleeding documented      Acute bleeding (Non GI site)      x Transfusion(s)                   Transfuse RBC 1 Unit Transfusion Orders 11/3/2022      x Acute/Chronic illness Left comminuted intertrochanteric femur fracture-status post intramedullary nailing November 1  Postobstructive acute kidney injury   Acute urinary retention  Prior history of DVT on chronic Eliquis  postoperative anemia requiring blood transfusion   Hospital Medicine Discharge Summary 11/3/2022      x Treatments       Hgb 7.1 this morning. Will transfuse 1 unit. Orthopedic Surgery Progress Notes 11/3/2022      x Other                     Hgb down this morning       Closed left intertrochanteric femur fracture    Procedure:Treatment left intertrochanteric femur fracture with intramedullary nail     EBL: 300 cc   Orthopedic Surgery Progress Notes 11/3/2022    Orthopedic Surgery Op Note 11/01/2022     Provider, please specify diagnosis or diagnoses associated with above clinical findings.   [  x ] Acute blood loss anemia expected post-operatively    [   ] Anemia, unspecified    [   ] Other Hematological Diagnosis (please specify):  _________________   [   ] Clinically Undetermined              Please document in your progress notes daily for the duration of treatment, until resolved, and include in your discharge summary.    Form No. [id number]

## 2022-11-06 NOTE — PT/OT/SLP PROGRESS
Occupational Therapy Inpatient Rehab Treatment    Name: Niecy Joshua  MRN: [id number]    Assessment:  Niecy Joshua is a 87 y.o. female admitted with a medical diagnosis of Closed comminuted intertrochanteric fracture of left femur with routine healing.  She presents with the following impairments/functional limitations:  weakness, impaired self care skills, impaired functional mobility, gait instability, impaired balance, decreased lower extremity function, pain, decreased ROM, orthopedic precautions.    General Precautions: Standard, fall     Orthopedic Precautions:LLE weight bearing as tolerated     Braces: N/A    Rehab Prognosis: Good; patient would benefit from acute skilled OT services to address these deficits and reach maximum level of function.      History:     Past Medical History:   Diagnosis Date    Cardiac arrhythmia     Low vitamin D level     Osteoporosis     Other pulmonary embolism without acute cor pulmonale        Past Surgical History:   Procedure Laterality Date    BREAST MASS EXCISION      x3  benign    LAPAROSCOPIC REPAIR OF INGUINAL HERNIA Bilateral        Subjective     Orientation: Oriented x4    Chief Complaint: fatigue    Patient/Family Comments/goals: return to PLOF    Vitals       Respiratory Status: Room air    Patients cultural, spiritual, Protestant conflicts given the current situation: no       Objective:     Patient found up in chair with    upon OT entry to room.    Mobility   Patient completed:  Sit to Stand Transfer with supervision with rolling walker  Stand to Sit Transfer with supervision with rolling walker    Functional Mobility  Pt mobilized with RW wc<>bathroom ~10 ft total with RW and again for ~15 ft with RW in OT gym. Pt c/o fatigue and unable to continue. Attempted to perform WIS transfer x3, but unsuccessful d/t difficulty lifting LLE over threshold. Edu on using TTB, but patient states she is unsure if that would work in her shower due to sliding doors. Will  reassess need as patient mobility and balance progresses.       Therapeutic Activities  W/c mobility x100 ft, increased time and effort with rest break provided due to low activity tolerance    Therapeutic Exercise  Instructed on HEP using light/yellow theraband for 2 sets of 15 of B bicep curl, tricep ext, shoulder flex, shoulder abduct and scap adduct as well as 3x5 w/c pushups performed to improve UE strength and endurance needed for improved self-care and mobility performance      LifeStyle Change and Education:             Patient left up in chair with all lines intact and call button in reach.     Education provided: Roles and goals of OT, ADLs, transfer training, and fall prevention    Multidisciplinary Problems       Occupational Therapy Goals          Problem: Occupational Therapy    Goal Priority Disciplines Outcome Interventions   Occupational Therapy Goal     OT, PT/OT Ongoing, Progressing    Description: ADLs:  Pt to perform grooming tasks with set up and standing at the sink.  Pt to perform UB dressing with set up   Pt to perform LB dressing with Partial/mod assist with AE as needed.  Pt to perform putting on/off footwear task with Max assist with AE as needed  Pt to perform toileting with partial/mod assist    Functional Transfers:  Pt to perform toilet transfers with partial/mod assist and RW  Pt to perform a tub transfer with partial/mod assist and RW/TTB  Pt to perform a walk-in shower transfer with partial/mod assist and RW    IADLs:  Pt to perform simple meal prep with partial/mod assist with RW     Balance, Strengthening, Endurance, Balance:  Pt to consistently demonstrate adherence to orthopedic precautions during all ADL's as instructed by OT.  Pt to demonstrate fair dynamic standing balance as required to perform ADL's from standing level.  Pt to demonstrate good BUE strength during functional task   Pt to demonstrate consistent adherence to breathing control and energy conservation  techniques as educated by OT.                        Time Tracking     OT Received On: 11/06/22  Time In 0900     Time Out 1030  Total Time 90 min  Therapy Time: OT Individual: 90  Missed Time:    Missed Time Reason:      Billable Minutes: Self Care/Home Management 30 and Therapeutic Exercise 60    11/06/2022

## 2022-11-06 NOTE — PROGRESS NOTES
Subjective:  HPI: 87 y.o. WF with PMH of DVT, osteoporosis, and vitamin-D deficiency presented to [facility] ED on 10/31/2022 for mechanical fall.  Patient reports she was walking to the bathroom when she fell onto her left hip.  Patient denies hitting head, LOC, chest pain, shortness of breath, abdominal pain, nausea, vomiting, dizziness, syncope, and fever.  Patient reports improvement of left hip pain after pain medications given in ED. Initial vital signs in the ED were /68, pulse 70, respirations 16, temperature 36.8° C, and SpO2 97%.  Labs revealed WBC 14.8, chloride 109, BUN 22.4, and glucose 148.  Chest x-ray revealed no acute pulmonary process.  Left hip x-ray revealed comminuted left intertrochanteric femur fracture.  Orthopedics was consulted with plans for surgery. Patient was admitted to hospital medicine service for further medical management. She underwent left intertrochanteric femur fracture intramedullary nailing on 11/1 with Dr. Friend. Participating with therapy. Functional status includes bed mobility and transfers requiring . Amb w/RW for . Patient was evaluated, accepted, and admitted to inpatient rehab to improve functional status. Transferred to [facility] on / without incident.    11/6: Seen in patient room, seated in WC eating breakfast. Reports good sleep. States that she had a suppository yesterday and was able to get a little stool out, but still feeling full. Discussed use of enema today following therapy if no further BM. No other complaints. VSSAF.     Review of Systems  Depression/Anxiety: no complaints     ALPRAZolam tablet 0.25 mg TID PRN Anxiety  Pain: left hip with movement-controlled     acetaminophen tablet 650 mg TID  methocarbamoL tablet 750 mg TID  acetaminophen tablet 650 mg q4hr PRN mild pain  oxyCODONE immediate release tablet 5 mg q4hr PRN mod pain  Bowels/Bladder: last BM 11/5 following suppository- not feeling fully evacuated; 11/3 (loose, incontinent-small amount)     Appetite: decreased from baseline-improving    Sleep: good          Physical Exam  General: well-developed, well-nourished, in no acute distress  Respiratory: equal chest rise, no SOB, no audible wheeze  Cardiovascular: regular rate and rhythm, no edema  Gastrointestinal: soft, non-tender, non-distended   Musculoskeletal: decreased ROM/strength to LLE  Integumentary: no rashes or skin lesions present, Left hip incisions x 4-staples, dressings-drainage saturated dressing   Neurologic: cranial nerves intact, no signs of peripheral neurological deficit, motor/sensory function intact      Labs:   Latest Reference Range & Units 11/06/22 05:17   Hemoglobin 12.0 - 16.0 gm/dL 9.2 (L)   Hematocrit 37.0 - 47.0 % 28.3 (L)   Sodium 136 - 145 mmol/L 141   Potassium 3.5 - 5.1 mmol/L 4.1   Chloride 98 - 107 mmol/L 108 (H)   CO2 23 - 31 mmol/L 25   Anion Gap mEq/L 8.0   BUN 9.8 - 20.1 mg/dL 31.4 (H)   Creatinine 0.55 - 1.02 mg/dL 0.74   BUN/CREAT RATIO  42   eGFR mls/min/1.73/m2 >60   Glucose 82 - 115 mg/dL 116 (H)   Calcium 8.4 - 10.2 mg/dL 7.9 (L)   Magnesium 1.60 - 2.60 mg/dL 2.00   (L): Data is abnormally low  (H): Data is abnormally high      Assessment/Plan  Hospital   Closed comminuted intertrochanteric fracture of left femur     Non-Hospital   Osteoporosis   Cardiac arrhythmia   Other pulmonary embolism without acute cor pulmonale   Chronic deep vein thrombosis (DVT) of distal vein of lower extremity   Low vitamin D level       Wounds: Left hip incisions x 4-staples, dressings-drainage saturated dressing   S/p left intertrochanteric femur fracture intramedullary nailing on 11/1 with Dr. Friend  Precautions: WBAT LLE  Bracing/AD: RW  Swallowing: Regular Diet  Function: Tolerating therapy. Continue PT/OT  VTE Prophylaxis: SCDs. Eliquis on Hold. Receiving Blood.   Code Status: FULL CODE   Discharge:Lives alone in the [place] in a single-story home with 4-5 steps and bilateral rails to enter the residence. She completed high  school. No  history. She is a retired . Patient is . She lives alone and was completely independent. Pts son lives behind her. He states that they have good family support and will be able to assist her after rehab.    Children (4). Date pending.

## 2022-11-06 NOTE — PLAN OF CARE
Problem: Physical Therapy  Goal: Physical Therapy Goal  Description: Bed Mobility:  Roll left and right with supervision/touching assist.  Sit to supine transfer with supervision/touching assist.  Supine to sit transfer with supervision/touching assist.    Transfers:  Sit to stand transfer with supervision/touching assist using RW.  Bed to chair transfer with supervision/touching assist using RW.  Car transfer with partial/moderate assist using RW.   an object from the ground in standing position with supervision/touching assist using RW and reacher.    Mobility:  Ambulate 50 feet with supervision/touching assist using RW.  Ambulate 15 feet on uneven surfaces/ramps with partial/moderate assist using RW.  Ascend/descend a 4 inch curb with partial/moderate assist using RW.   Ascend/descend 5 stairs with partial/moderate assist using left handrail.    Outcome: Ongoing, Progressing

## 2022-11-06 NOTE — PT/OT/SLP PROGRESS
Physical Therapy Inpatient Rehab Treatment    Patient Name:  Niecy Joshua   MRN:  [id number]    Recommendations:     Discharge Recommendations:      Discharge Equipment Recommendations: walker, rolling   Barriers to discharge:       Assessment:     Niecy Joshua is a 87 y.o. female admitted with a medical diagnosis of Closed comminuted intertrochanteric fracture of left femur with routine healing.  She presents with the following impairments/functional limitations:     .    Rehab Diagnosis: L femur fx s/p IM nail    Recent Surgery: * No surgery found *      General Precautions: Standard, fall     Orthopedic Precautions:LLE weight bearing as tolerated     Braces: N/A    Rehab Prognosis: Good; patient would benefit from acute skilled PT services to address these deficits and reach maximum level of function.      History:     Past Medical History:   Diagnosis Date    Cardiac arrhythmia     Low vitamin D level     Osteoporosis     Other pulmonary embolism without acute cor pulmonale        Past Surgical History:   Procedure Laterality Date    BREAST MASS EXCISION      x3  benign    LAPAROSCOPIC REPAIR OF INGUINAL HERNIA Bilateral        Subjective     Chief Complaint: Pt with c/o L hip pain.    Respiratory Status: Room air    Patients cultural, spiritual, Yarsanism conflicts given the current situation: no      Objective:     Communicated with NSG prior to session.  Patient found up in chair with    upon PT entry to room.    Pt is Oriented x3 and Alert, Cooperative, and Motivated.    Vitals   Vitals at Rest  BP    HR    O2 Sat    Pain 4/10 L hip     Vitals With Activity  BP    HR    O2 Sat    Pain 6/10 L hip       Functional Mobility:   Transfers:     Sit to Stand: Partial/moderate assistance  with rolling walker  Bed to Chair: Partial/moderate assistance  with rolling walker  using  Step Transfer  Gait: Pt ambulates 16'+26'+22' with seated rest breaks between trials and standing breaks within trials. Pt req VC  for increasing ANT throughout with RW with Supervision or touching assistance .   Wheelchair Propulsion:  Pt propelled Standard wheelchair x 120 feet on Level tile with  Right upper extremity and Left upper extremity with Supervision or touching assistance .      Current   Status  Discharge   Goal   Functional Area: Care Score:    Roll Left and Right   Independent   Sit to Lying   Independent   Lying to Sitting on Side of Bed   Independent   Sit to Stand 3 Independent   Chair/Bed-to-Chair Transfer 3 Independent   Car Transfer   Supervision or touching assistance   Walk 10 Feet 3 Independent   Walk 50 Feet with Two Turns   Supervision or touching assistance   Walk 150 Feet   Supervision or touching assistance   Walk 10 Feet Uneven Surface   Supervision or touching assistance   1 Step (Curb)   Supervision or touching assistance   4 Steps   Supervision or touching assistance   12 Steps   Partial/moderate assistance   Picking Up Object   Independent   Wheel 50 Feet with Two Turns 4 Independent   Wheel 150 Feet   Independent       Therapeutic Activities and Exercises:  Seated therex performed in WC 2x15 ea:     Hip flexion with AAROM on L     Glute sets     Ball squeezes     PF/DF    Activity Tolerance: Excellent    Patient left up in chair with call button in reach.    Education provided: transfer training, gait training, and strengthening exercises    Expected compliance: High compliance    GOALS:   Multidisciplinary Problems       Physical Therapy Goals          Problem: Physical Therapy    Goal Priority Disciplines Outcome Goal Variances Interventions   Physical Therapy Goal     PT, PT/OT Ongoing, Progressing     Description: Bed Mobility:  Roll left and right with supervision/touching assist.  Sit to supine transfer with supervision/touching assist.  Supine to sit transfer with supervision/touching assist.    Transfers:  Sit to stand transfer with supervision/touching assist using RW.  Bed to chair transfer with  supervision/touching assist using RW.  Car transfer with partial/moderate assist using RW.   an object from the ground in standing position with supervision/touching assist using RW and reacher.    Mobility:  Ambulate 50 feet with supervision/touching assist using RW.  Ambulate 15 feet on uneven surfaces/ramps with partial/moderate assist using RW.  Ascend/descend a 4 inch curb with partial/moderate assist using RW.   Ascend/descend 5 stairs with partial/moderate assist using left handrail.                         Plan:     During this hospitalization, patient to be seen daily (5-7x/week) to address the identified rehab impairments via gait training, therapeutic exercises, therapeutic activities, wheelchair management/training and progress toward the following goals:    Plan of Care Expires:  11/10/22  PT Next Visit Date: 11/10/22  Plan of Care reviewed with: patient    Additional Information:         Time Tracking:     Therapy Time  PT Start Time: 0745  PT Stop Time: 0845  PT Total Time (min): 60 min   PT Individual: 60  Missed Time:    Time Missed due to:      Billable Minutes: Gait Training 30 and Therapeutic Exercise 30    11/06/2022

## 2022-11-07 LAB
ALBUMIN SERPL-MCNC: 2.7 GM/DL (ref 3.4–4.8)
ALBUMIN/GLOB SERPL: 1.1 RATIO (ref 1.1–2)
ALP SERPL-CCNC: 197 UNIT/L (ref 40–150)
ALT SERPL-CCNC: 60 UNIT/L (ref 0–55)
AST SERPL-CCNC: 62 UNIT/L (ref 5–34)
BASOPHILS # BLD AUTO: 0.02 X10(3)/MCL (ref 0–0.2)
BASOPHILS NFR BLD AUTO: 0.3 %
BILIRUBIN DIRECT+TOT PNL SERPL-MCNC: 2.2 MG/DL
BUN SERPL-MCNC: 27 MG/DL (ref 9.8–20.1)
CALCIUM SERPL-MCNC: 8.1 MG/DL (ref 8.4–10.2)
CHLORIDE SERPL-SCNC: 106 MMOL/L (ref 98–107)
CO2 SERPL-SCNC: 27 MMOL/L (ref 23–31)
CREAT SERPL-MCNC: 0.66 MG/DL (ref 0.55–1.02)
EOSINOPHIL # BLD AUTO: 0.23 X10(3)/MCL (ref 0–0.9)
EOSINOPHIL NFR BLD AUTO: 3.1 %
ERYTHROCYTE [DISTWIDTH] IN BLOOD BY AUTOMATED COUNT: 19.5 % (ref 11.5–17)
GFR SERPLBLD CREATININE-BSD FMLA CKD-EPI: >60 MLS/MIN/1.73/M2
GLOBULIN SER-MCNC: 2.5 GM/DL (ref 2.4–3.5)
GLUCOSE SERPL-MCNC: 115 MG/DL (ref 82–115)
HCT VFR BLD AUTO: 31.2 % (ref 37–47)
HGB BLD-MCNC: 10.1 GM/DL (ref 12–16)
IMM GRANULOCYTES # BLD AUTO: 0.04 X10(3)/MCL (ref 0–0.04)
IMM GRANULOCYTES NFR BLD AUTO: 0.5 %
LYMPHOCYTES # BLD AUTO: 1.29 X10(3)/MCL (ref 0.6–4.6)
LYMPHOCYTES NFR BLD AUTO: 17.1 %
MAGNESIUM SERPL-MCNC: 2 MG/DL (ref 1.6–2.6)
MCH RBC QN AUTO: 28.5 PG (ref 27–31)
MCHC RBC AUTO-ENTMCNC: 32.4 MG/DL (ref 33–36)
MCV RBC AUTO: 88.1 FL (ref 80–94)
MONOCYTES # BLD AUTO: 1.11 X10(3)/MCL (ref 0.1–1.3)
MONOCYTES NFR BLD AUTO: 14.7 %
NEUTROPHILS # BLD AUTO: 4.9 X10(3)/MCL (ref 2.1–9.2)
NEUTROPHILS NFR BLD AUTO: 64.3 %
NRBC BLD AUTO-RTO: 0 %
PHOSPHATE SERPL-MCNC: 1.7 MG/DL (ref 2.3–4.7)
PLATELET # BLD AUTO: 204 X10(3)/MCL (ref 130–400)
PMV BLD AUTO: 9.8 FL (ref 7.4–10.4)
POTASSIUM SERPL-SCNC: 4.4 MMOL/L (ref 3.5–5.1)
PROT SERPL-MCNC: 5.2 GM/DL (ref 5.8–7.6)
RBC # BLD AUTO: 3.54 X10(6)/MCL (ref 4.2–5.4)
SODIUM SERPL-SCNC: 141 MMOL/L (ref 136–145)
WBC # SPEC AUTO: 7.5 X10(3)/MCL (ref 4.5–11.5)

## 2022-11-07 PROCEDURE — 97116 GAIT TRAINING THERAPY: CPT

## 2022-11-07 PROCEDURE — 84100 ASSAY OF PHOSPHORUS: CPT | Performed by: NURSE PRACTITIONER

## 2022-11-07 PROCEDURE — 80053 COMPREHEN METABOLIC PANEL: CPT | Performed by: NURSE PRACTITIONER

## 2022-11-07 PROCEDURE — 11800000 HC REHAB PRIVATE ROOM

## 2022-11-07 PROCEDURE — 36415 COLL VENOUS BLD VENIPUNCTURE: CPT | Performed by: NURSE PRACTITIONER

## 2022-11-07 PROCEDURE — 97110 THERAPEUTIC EXERCISES: CPT

## 2022-11-07 PROCEDURE — 83735 ASSAY OF MAGNESIUM: CPT | Performed by: NURSE PRACTITIONER

## 2022-11-07 PROCEDURE — 25000003 PHARM REV CODE 250: Performed by: NURSE PRACTITIONER

## 2022-11-07 PROCEDURE — 99233 SBSQ HOSP IP/OBS HIGH 50: CPT | Mod: ,,, | Performed by: PHYSICAL MEDICINE & REHABILITATION

## 2022-11-07 PROCEDURE — 85025 COMPLETE CBC W/AUTO DIFF WBC: CPT | Performed by: NURSE PRACTITIONER

## 2022-11-07 PROCEDURE — 97530 THERAPEUTIC ACTIVITIES: CPT

## 2022-11-07 PROCEDURE — 99233 PR SUBSEQUENT HOSPITAL CARE,LEVL III: ICD-10-PCS | Mod: ,,, | Performed by: PHYSICAL MEDICINE & REHABILITATION

## 2022-11-07 PROCEDURE — 63600175 PHARM REV CODE 636 W HCPCS: Performed by: NURSE PRACTITIONER

## 2022-11-07 PROCEDURE — 94799 UNLISTED PULMONARY SVC/PX: CPT

## 2022-11-07 RX ORDER — FUROSEMIDE 10 MG/ML
40 INJECTION INTRAMUSCULAR; INTRAVENOUS DAILY
Status: COMPLETED | OUTPATIENT
Start: 2022-11-07 | End: 2022-11-09

## 2022-11-07 RX ADMIN — ACETAMINOPHEN 650 MG: 325 TABLET, FILM COATED ORAL at 05:11

## 2022-11-07 RX ADMIN — ACETAMINOPHEN 650 MG: 325 TABLET, FILM COATED ORAL at 08:11

## 2022-11-07 RX ADMIN — POLYETHYLENE GLYCOL 3350 17 G: 17 POWDER, FOR SOLUTION ORAL at 08:11

## 2022-11-07 RX ADMIN — METHOCARBAMOL 750 MG: 750 TABLET ORAL at 06:11

## 2022-11-07 RX ADMIN — POTASSIUM PHOSPHATE, MONOBASIC AND POTASSIUM PHOSPHATE, DIBASIC 30 MMOL: 224; 236 INJECTION, SOLUTION, CONCENTRATE INTRAVENOUS at 08:11

## 2022-11-07 RX ADMIN — METHOCARBAMOL 750 MG: 750 TABLET ORAL at 12:11

## 2022-11-07 RX ADMIN — PANTOPRAZOLE SODIUM 40 MG: 40 TABLET, DELAYED RELEASE ORAL at 08:11

## 2022-11-07 RX ADMIN — FUROSEMIDE 40 MG: 10 INJECTION, SOLUTION INTRAMUSCULAR; INTRAVENOUS at 12:11

## 2022-11-07 RX ADMIN — APIXABAN 5 MG: 5 TABLET, FILM COATED ORAL at 08:11

## 2022-11-07 RX ADMIN — DOCUSATE SODIUM 100 MG: 100 CAPSULE, LIQUID FILLED ORAL at 08:11

## 2022-11-07 RX ADMIN — METHOCARBAMOL 750 MG: 750 TABLET ORAL at 05:11

## 2022-11-07 NOTE — PT/OT/SLP PROGRESS
"Physical Therapy Inpatient Rehab Treatment    Patient Name:  Niecy Joshua   MRN:  [id number]    Recommendations:     Discharge Recommendations:      Discharge Equipment Recommendations: walker, rolling   Barriers to discharge: None    Assessment:     Niecy Joshua is a 87 y.o. female admitted with a medical diagnosis of Closed comminuted intertrochanteric fracture of left femur with routine healing.  She presents with the following impairments/functional limitations:  weakness, impaired endurance, impaired functional mobility, gait instability, impaired balance, decreased lower extremity function, decreased safety awareness, pain, decreased ROM, edema.    Rehab Diagnosis: L femur fx s/p IM nail    Recent Surgery: * No surgery found *      General Precautions: Standard, fall     Orthopedic Precautions:LLE weight bearing as tolerated     Braces: N/A    Rehab Prognosis: Good; patient would benefit from acute skilled PT services to address these deficits and reach maximum level of function.      History:     Past Medical History:   Diagnosis Date    Cardiac arrhythmia     Low vitamin D level     Osteoporosis     Other pulmonary embolism without acute cor pulmonale        Past Surgical History:   Procedure Laterality Date    BREAST MASS EXCISION      x3  benign    LAPAROSCOPIC REPAIR OF INGUINAL HERNIA Bilateral        Subjective     Chief Complaint: "My left hip is stiff and hurting"    Respiratory Status: Room air    Patients cultural, spiritual, Zoroastrian conflicts given the current situation: no      Objective:     Communicated with RN prior to session.  Patient found up in chair with peripheral IV, see catheter  upon PT entry to room.    Pt is Oriented x3 and Alert, Cooperative, and Motivated.    Vitals     Functional Mobility:      Current   Status   Discharge   Goal   Functional Area: Care Score:     Roll Left and Right    Independent   Sit to Lying   Independent   Lying to Sitting on Side of Bed   " Independent   Sit to Stand 4 With RW; overall CGA to trunk Independent   Chair/Bed-to-Chair Transfer 3 W/c > Mat using stand step t/f with RW; Overall Min A to trunk Independent   Car Transfer    Supervision or touching assistance   Walk 10 Feet   Independent   Walk 50 Feet with Two Turns    Supervision or touching assistance   Walk 150 Feet    Supervision or touching assistance   Walk 10 Feet Uneven Surface    Supervision or touching assistance   1 Step (Curb)    Supervision or touching assistance   4 Steps    Supervision or touching assistance   12 Steps    Partial/moderate assistance   Picking Up Object    Independent   Wheel 50 Feet with Two Turns 4  Independent   Wheel 150 Feet 3 ~75 ft with standard w/c with BUE propulsion; overall supervision; Pt limited in distance 2/2 to fatigue  Independent       Therapeutic Activities and Exercises:  Sit to stands: performed in w/c with RW; 2 x 5    Cone exercises: performed EOM; Abduction and adduction reaches with 4 cones; x 4 trials BUE    Forward reaches with Red medicine ball: performed EOM; 2 x 10 with BUE    Seated Theraband exercises: performed EOM; 2 x 10 with red theraband BUE; PT assist with holding theraband   Rows   IR/ER   Lat Pulldown    Activity Tolerance: Excellent    Patient left up in chair with all lines intact, call button in reach, and chair alarm on.    Education provided: roles and goals of PT/PTA, transfer training, gait training, balance training, safety awareness, body mechanics, assistive device, strengthening exercises, and fall prevention    Expected compliance: High compliance    GOALS:   Multidisciplinary Problems       Physical Therapy Goals          Problem: Physical Therapy    Goal Priority Disciplines Outcome Goal Variances Interventions   Physical Therapy Goal     PT, PT/OT Ongoing, Progressing     Description: Bed Mobility:  Roll left and right with supervision/touching assist.  Sit to supine transfer with supervision/touching  assist.  Supine to sit transfer with supervision/touching assist.    Transfers:  Sit to stand transfer with supervision/touching assist using RW.  Bed to chair transfer with supervision/touching assist using RW.  Car transfer with partial/moderate assist using RW.   an object from the ground in standing position with supervision/touching assist using RW and reacher.    Mobility:  Ambulate 50 feet with supervision/touching assist using RW.  Ambulate 15 feet on uneven surfaces/ramps with partial/moderate assist using RW.  Ascend/descend a 4 inch curb with partial/moderate assist using RW.   Ascend/descend 5 stairs with partial/moderate assist using left handrail.                         Plan:     During this hospitalization, patient to be seen daily (5-7x/week) to address the identified rehab impairments via gait training, therapeutic exercises, therapeutic activities, wheelchair management/training and progress toward the following goals:    Plan of Care Expires:  11/10/22  PT Next Visit Date: 11/10/22  Plan of Care reviewed with: patient    Additional Information:         Time Tracking:     Therapy Time  PT Received On: 11/07/22  PT Start Time: 1300  PT Stop Time: 1400  PT Total Time (min): 60 min   PT Individual: 60  Missed Time:    Time Missed due to:      Billable Minutes: Therapeutic Exercise 60    11/07/2022

## 2022-11-07 NOTE — PROGRESS NOTES
Subjective:  HPI: 87 y.o. WF with PMH of DVT, osteoporosis, and vitamin-D deficiency presented to [facility] ED on 10/31/2022 for mechanical fall.  Patient reports she was walking to the bathroom when she fell onto her left hip.  Patient denies hitting head, LOC, chest pain, shortness of breath, abdominal pain, nausea, vomiting, dizziness, syncope, and fever.  Patient reports improvement of left hip pain after pain medications given in ED. Initial vital signs in the ED were /68, pulse 70, respirations 16, temperature 36.8° C, and SpO2 97%.  Labs revealed WBC 14.8, chloride 109, BUN 22.4, and glucose 148.  Chest x-ray revealed no acute pulmonary process.  Left hip x-ray revealed comminuted left intertrochanteric femur fracture.  Orthopedics was consulted with plans for surgery. Patient was admitted to hospital medicine service for further medical management. She underwent left intertrochanteric femur fracture intramedullary nailing on 11/1 with Dr. Friend. Participating with therapy. Functional status includes bed mobility and transfers requiring . Amb w/RW for . Patient was evaluated, accepted, and admitted to inpatient rehab to improve functional status. Transferred to [facility] on / without incident.    11/7: Seen with RT, standing w/RW and playing bean bag toss. Admits to having an enema yesterday afternoon and clearing out fairly good although she was still passing gas this morning. Increased standing tolerance per RT. Progressing in therapy without complaint. VSSAF with noted SpO2 drop to 92%. IM following as well.     Review of Systems  Depression/Anxiety: no complaints     ALPRAZolam tablet 0.25 mg TID PRN Anxiety  Pain: left hip with movement-controlled     acetaminophen tablet 650 mg TID  methocarbamoL tablet 750 mg TID  acetaminophen tablet 650 mg q4hr PRN mild pain  oxyCODONE immediate release tablet 5 mg q4hr PRN mod pain  Bowels/Bladder: last BM 11/7, 11/6 following enema, 11/5 following suppository- not  feeling fully evacuated; 11/3 (loose, incontinent-small amount)    Appetite: decreased from baseline-improving    Sleep: good          Physical Exam  General: well-developed, well-nourished, in no acute distress  Respiratory: equal chest rise, no SOB, no audible wheeze  Cardiovascular: regular rate and rhythm, no edema  Gastrointestinal: soft, non-tender, non-distended   Musculoskeletal: decreased ROM/strength to LLE  Integumentary: no rashes or skin lesions present, Left hip incisions x 4-staples, dressings-drainage saturated dressing   Neurologic: cranial nerves intact, no signs of peripheral neurological deficit, motor/sensory function intact  *MD performed and documented physical examination      Labs:   Latest Reference Range & Units 11/07/22 05:19   WBC 4.5 - 11.5 x10(3)/mcL 7.5   RBC 4.20 - 5.40 x10(6)/mcL 3.54 (L)   Hemoglobin 12.0 - 16.0 gm/dL 10.1 (L)   Hematocrit 37.0 - 47.0 % 31.2 (L)   MCV 80.0 - 94.0 fL 88.1   MCH 27.0 - 31.0 pg 28.5   MCHC 33.0 - 36.0 mg/dL 32.4 (L)   RDW 11.5 - 17.0 % 19.5 (H)   Platelets 130 - 400 x10(3)/mcL 204   MPV 7.4 - 10.4 fL 9.8   Neut % % 64.3   LYMPH % % 17.1   Mono % % 14.7   Eosinophil % % 3.1   Basophil % % 0.3   Immature Granulocytes % 0.5   Neut # 2.1 - 9.2 x10(3)/mcL 4.9   Lymph # 0.6 - 4.6 x10(3)/mcL 1.29   Mono # 0.1 - 1.3 x10(3)/mcL 1.11   Eos # 0 - 0.9 x10(3)/mcL 0.23   Baso # 0 - 0.2 x10(3)/mcL 0.02   Immature Grans (Abs) 0 - 0.04 x10(3)/mcL 0.04   nRBC % 0.0   Sodium 136 - 145 mmol/L 141   Potassium 3.5 - 5.1 mmol/L 4.4   Chloride 98 - 107 mmol/L 106   CO2 23 - 31 mmol/L 27   BUN 9.8 - 20.1 mg/dL 27.0 (H)   Creatinine 0.55 - 1.02 mg/dL 0.66   eGFR mls/min/1.73/m2 >60   Glucose 82 - 115 mg/dL 115   Calcium 8.4 - 10.2 mg/dL 8.1 (L)   Phosphorus 2.3 - 4.7 mg/dL 1.7 (L)   Magnesium 1.60 - 2.60 mg/dL 2.00   Alkaline Phosphatase 40 - 150 unit/L 197 (H)   PROTEIN TOTAL 5.8 - 7.6 gm/dL 5.2 (L)   Albumin 3.4 - 4.8 gm/dL 2.7 (L)   Albumin/Globulin Ratio 1.1 - 2.0 ratio  1.1   BILIRUBIN TOTAL <=1.5 mg/dL 2.2 (H)   AST 5 - 34 unit/L 62 (H)   ALT 0 - 55 unit/L 60 (H)   Globulin, Total 2.4 - 3.5 gm/dL 2.5   (L): Data is abnormally low  (H): Data is abnormally high    Assessment/Plan  Hospital   Closed comminuted intertrochanteric fracture of left femur     Non-Hospital   Osteoporosis   Cardiac arrhythmia   Other pulmonary embolism without acute cor pulmonale   Chronic deep vein thrombosis (DVT) of distal vein of lower extremity   Low vitamin D level       Wounds: Left hip incisions x 4-staples, dressings-drainage saturated dressing   S/p left intertrochanteric femur fracture intramedullary nailing on 11/1 with Dr. Friend  Precautions: WBAT LLE  Bracing/AD: RW  Swallowing: Regular Diet  Function: Tolerating therapy. Continue PT/OT  VTE Prophylaxis: SCDs. Eliquis on Hold. Receiving Blood.   Code Status: FULL CODE   Discharge:Lives alone in the [place] in a single-story home with 4-5 steps and bilateral rails to enter the residence. She completed high school. No  history. She is a retired . Patient is . She lives alone and was completely independent. Pts son lives behind her. He states that they have good family support and will be able to assist her after rehab.    Children (4). Date 11/17 Thursday.     Dos 11/7/22  Patient seen and evaluated in OT today  Continues to participate and make progress toward goals  Working on ADL's and IADL's  Discussed with patient and OT  Reviewed chart and discussed with nursing, Dr Fall's primary medicine team, as well as Dedra Ragland, my NP  Agree with present POC               Dilcia Ragland NP, conducted additional independent physical examination and assisted with medical documentation.

## 2022-11-07 NOTE — PT/OT/SLP PROGRESS
"Occupational Therapy Inpatient Rehab Treatment    Name: Niecy Joshua  MRN: [id number]    Assessment:  Niecy Joshua is a 87 y.o. female admitted with a medical diagnosis of Closed comminuted intertrochanteric fracture of left femur with routine healing.  She presents with the following impairments/functional limitations:  impaired endurance, impaired functional mobility, impaired self care skills .    General Precautions: Standard, fall     Orthopedic Precautions:LLE weight bearing as tolerated     Braces: N/A    Rehab Prognosis: Good; patient would benefit from acute skilled OT services to address these deficits and reach maximum level of function.      History:     Past Medical History:   Diagnosis Date    Cardiac arrhythmia     Low vitamin D level     Osteoporosis     Other pulmonary embolism without acute cor pulmonale        Past Surgical History:   Procedure Laterality Date    BREAST MASS EXCISION      x3  benign    LAPAROSCOPIC REPAIR OF INGUINAL HERNIA Bilateral        Subjective     Orientation: Oriented x4    Chief Complaint: "I'm so tired"    Patient/Family Comments/goals: "I want to get better"    Respiratory Status: Room air    Patients cultural, spiritual, Rastafarian conflicts given the current situation: no       Objective:     Patient found up in chair with peripheral IV  upon OT entry to room.    Mobility   Patient completed:  Sit to Stand Transfer with contact guard assistance with rolling walker  Stand to Sit Transfer with stand by assistance with rolling walker        Limiting Factors for ADLs: endurance, limited ROM, weakness, and pain          Therapeutic Activities  Pt. Tolerated 6 min static standing c one rest break to perform B UE AROM c graded pinch clips on vertical and horizontal dowels.     Therapeutic Exercise  Pt. Performed 5 Min B UE AROM at 1.5 cisneros ; Pt. C 3 # dowel performed 15 reps B UE AROM c rest breaks. Pt. C noted decreased ABDuction of R UE c/o "pulling pain" c > 80* " shoulder flexion. Pt. States she dons jacket c L UE first.           LifeStyle Change and Education:             Patient left up in chair with call button in reach and chair alarm on.     Education provided: ADLs, transfer training, body mechanics, and equipment recommendations    Multidisciplinary Problems       Occupational Therapy Goals          Problem: Occupational Therapy    Goal Priority Disciplines Outcome Interventions   Occupational Therapy Goal     OT, PT/OT Ongoing, Progressing    Description: ADLs:  Pt to perform grooming tasks with set up and standing at the sink.  Pt to perform UB dressing with set up   Pt to perform LB dressing with Partial/mod assist with AE as needed.  Pt to perform putting on/off footwear task with Max assist with AE as needed  Pt to perform toileting with partial/mod assist    Functional Transfers:  Pt to perform toilet transfers with partial/mod assist and RW  Pt to perform a tub transfer with partial/mod assist and RW/TTB  Pt to perform a walk-in shower transfer with partial/mod assist and RW    IADLs:  Pt to perform simple meal prep with partial/mod assist with RW     Balance, Strengthening, Endurance, Balance:  Pt to consistently demonstrate adherence to orthopedic precautions during all ADL's as instructed by OT.  Pt to demonstrate fair dynamic standing balance as required to perform ADL's from standing level.  Pt to demonstrate good BUE strength during functional task   Pt to demonstrate consistent adherence to breathing control and energy conservation techniques as educated by OT.                        Time Tracking     OT Received On: 11/07/22  Time In 1100     Time Out 1130  Total Time 30 min  Therapy Time: OT Individual: 30  Missed Time:  0  Missed Time Reason:      Billable Minutes: Therapeutic Activity 10 and Therapeutic Exercise 20    11/07/2022

## 2022-11-07 NOTE — PROGRESS NOTES
11/07/22 0900   Rec Therapy Time Calculation   Date of Treatment 11/07/22   Rec Start Time 0900   Rec Stop Time 0930   Rec Total Time (min) 30 min   Time   Treatment time 2 units   Precautions   General Precautions fall   Orthopedic Precautions  N/A   Braces N/A   Pain/Comfort   Pain Rating 1 3/10   Location - Side 1 Left   Location - Orientation 1 lower   Location 1 hip   Pain Addressed 1 Reposition   OTHER   Rehab identified problem list/impairments impaired functional mobility;gait instability;decreased lower extremity function   Values/Beliefs/Spiritual Care   Spiritual, Cultural Beliefs, [employer] Practices, Values that Affect Care no   Overall Level of Functioning   Activity Tolerance Mod Indep   Dynamic Sitting Balance/Reaching Does not occur   Dynamic Standing Balance/Reaching Mod Indep   Right UE Coodination/Dexterity Independent   Left UE Coordination/Dexterity Independent   Problem Solving/Sequencing Skills Independent   Memory Recall Independent   R/L Neglect/Inattention Does not occur   Attention Span Independent   Recreational Therapy Short Term Goals   Short Term Goal 1 Progression Met   Short Term Goal 2 Progression Met   Recreational Therapy Long Term Goals   Long Term Goal 1 Progression Met   Long Term Goal 2 Progression Met   Plan   Patient to be seen Daily   Planned Duration 1 week   Treatments Planned Energy conservation training   Treatment plan/goals estblished with Patient/Caregiver Yes

## 2022-11-07 NOTE — PT/OT/SLP PROGRESS
"Physical Therapy Inpatient Rehab Treatment    Patient Name:  Niecy Joshua   MRN:  [id number]    Recommendations:     Discharge Recommendations:      Discharge Equipment Recommendations: walker, rolling   Barriers to discharge: None    Assessment:     Niecy Joshua is a 87 y.o. female admitted with a medical diagnosis of Closed comminuted intertrochanteric fracture of left femur with routine healing.  She presents with the following impairments/functional limitations:  weakness, impaired endurance, impaired functional mobility, gait instability, impaired balance, decreased lower extremity function, decreased safety awareness, pain, decreased ROM, edema.    Rehab Diagnosis: L femur fx s/p IM nail    Recent Surgery: * No surgery found *      General Precautions: Standard, fall     Orthopedic Precautions:LLE weight bearing as tolerated     Braces: N/A    Rehab Prognosis: Good; patient would benefit from acute skilled PT services to address these deficits and reach maximum level of function.      History:     Past Medical History:   Diagnosis Date    Cardiac arrhythmia     Low vitamin D level     Osteoporosis     Other pulmonary embolism without acute cor pulmonale        Past Surgical History:   Procedure Laterality Date    BREAST MASS EXCISION      x3  benign    LAPAROSCOPIC REPAIR OF INGUINAL HERNIA Bilateral        Subjective     Chief Complaint: "I feel like I can't get my leg to move"    Respiratory Status: Room air    Patients cultural, spiritual, Hinduism conflicts given the current situation: no      Objective:     Communicated with RN prior to session.  Patient found up in chair with peripheral IV, see catheter  upon PT entry to room.    Pt is Oriented x3 and Alert, Cooperative, and Motivated.    Vitals     Functional Mobility:    Current   Status   Discharge   Goal   Functional Area: Care Score:     Roll Left and Right    Independent   Sit to Lying 3 Performed on Mat; overall Mod A to trunk; use of " leg  to BLE; VC for sequencing Independent   Lying to Sitting on Side of Bed 3 Performed on Mat; overall Min A to trunk; use of Leg  to LLE; VC for sequencing Independent   Sit to Stand 4 With RW; overall CGA to trunk Independent   Chair/Bed-to-Chair Transfer 3 W/c <> Mat using stand step t/f with RW; Overall Min A to trunk; VC for sequencing Independent   Car Transfer    Supervision or touching assistance   Walk 10 Feet 3 ~17 ft + 22 ft with RW using step to gait pattern with slowed pace; overall Min A to trunk; Pt demonstrated decreased step length and muscle guarding; Pt required encouragement for increased distance; VC for weight shifts to clear L toe and to increase ANT; VC to bear weight on LLE as tolerated Independent   Walk 50 Feet with Two Turns    Supervision or touching assistance   Walk 150 Feet    Supervision or touching assistance   Walk 10 Feet Uneven Surface    Supervision or touching assistance   1 Step (Curb)    Supervision or touching assistance   4 Steps    Supervision or touching assistance   12 Steps    Partial/moderate assistance   Picking Up Object    Independent   Wheel 50 Feet with Two Turns 4 ~100 ft with standard w/c with BUE propulsion; overall supervision; slowed pace Independent   Wheel 150 Feet    Independent       Therapeutic Activities and Exercises:  Supine TherX: performed on Mat; 3 x 10 LLE  Quad set  Glute set  TKE on bolster  Hip and knee flexion  Hip abd/add   Ankle pumps      Activity Tolerance: Excellent    Patient left up in chair with all lines intact, chair alarm on, and OT present.    Education provided: roles and goals of PT/PTA, transfer training, bed mob, gait training, balance training, safety awareness, body mechanics, assistive device, strengthening exercises, and fall prevention    Expected compliance: High compliance    GOALS:   Multidisciplinary Problems       Physical Therapy Goals          Problem: Physical Therapy    Goal Priority Disciplines  Outcome Goal Variances Interventions   Physical Therapy Goal     PT, PT/OT Ongoing, Progressing     Description: Bed Mobility:  Roll left and right with supervision/touching assist.  Sit to supine transfer with supervision/touching assist.  Supine to sit transfer with supervision/touching assist.    Transfers:  Sit to stand transfer with supervision/touching assist using RW.  Bed to chair transfer with supervision/touching assist using RW.  Car transfer with partial/moderate assist using RW.   an object from the ground in standing position with supervision/touching assist using RW and reacher.    Mobility:  Ambulate 50 feet with supervision/touching assist using RW.  Ambulate 15 feet on uneven surfaces/ramps with partial/moderate assist using RW.  Ascend/descend a 4 inch curb with partial/moderate assist using RW.   Ascend/descend 5 stairs with partial/moderate assist using left handrail.                         Plan:     During this hospitalization, patient to be seen daily (5-7x/week) to address the identified rehab impairments via gait training, therapeutic exercises, therapeutic activities, wheelchair management/training and progress toward the following goals:    Plan of Care Expires:  11/10/22  PT Next Visit Date: 11/10/22  Plan of Care reviewed with: patient    Additional Information:         Time Tracking:     Therapy Time  PT Received On: 11/07/22  PT Start Time: 0930  PT Stop Time: 1100  PT Total Time (min): 90 min   PT Individual: 90  Missed Time:    Time Missed due to:      Billable Minutes: Gait Training 20 and Therapeutic Exercise 70    11/07/2022

## 2022-11-07 NOTE — PLAN OF CARE
Spoke with pt then called son Lukas ([phone]) to discuss team conference updates and discharge planned for Thurs, 11/17/22.      Discussed the desire for family to do family training (he will discuss with his 2 siblings and call me back to schedule).    Discussed our recommendation of HH (he will have his dtr call me to discuss; she is a prn PT here).    Discussed the recommendation of a shower chair (he will get one for patient on his own).     Discussed the possible need for us to order a rental w/c for pt, based upon her gait distance.  He voiced understanding.    Lukas is retired and lives behind pt's home.  His brother lives down the road, and his sister lives 15 minutes away, so pt should have plenty of support.

## 2022-11-07 NOTE — PROGRESS NOTES
[facility] ([facility])  Rehab Progress Note    Patient Name: Niecy Joshua  MRN: [id number]  Age: 87 y.o. Sex: female  : 1935  Hospital Length of Stay: 4 days  Date of Service: 2022   Chief Complaint: Left hip fracture s/p IMN on 2022    Subjective:     Basic Information  Admit Information: 87-year-old white female presented to [facility] ED on 10/31/2022 complaining of left hip pain after falling from ground level.  PMH significant for unprovoked DVT diagnosed in  and osteoporosis.  Work up significant for hip fracture.  Tolerated left IMN on  without perioperative complications.  Orthopedic surgery recommended WBAT to RLE.  H&H continues to trend downward with hemoglobin 7.1 requiring 1 unit PRBC transfusion.  Tolerated transfer to [facility] inpatient rehab unit on  without incident.  Today's Information: No acute events overnight.  Sitting up in chair.  Reports good sleep and appetite.  Last BM .  Vital signs at goal with no recorded fevers.  H&H trending up.  Renal indices improved.  Phosphorus 1.7.  Still with lower extremity edema and drainage from incision site.  Non infectious.  No imaging today.    Review of patient's allergies indicates:  No Known Allergies     Current Facility-Administered Medications:     0.9%  NaCl infusion (for blood administration), , Intravenous, Q24H PRN, Luis Felipe FUNES Julius, FNP    acetaminophen tablet 650 mg, 650 mg, Oral, Q4H PRN, Luis Felipe FUNES Julius, FNP    acetaminophen tablet 650 mg, 650 mg, Oral, TID, Dilcia FUNES [place], FNP, 650 mg at 22 0525    ALPRAZolam tablet 0.25 mg, 0.25 mg, Oral, TID PRN, Luis Felipe A Julius, FNP    benzonatate capsule 100 mg, 100 mg, Oral, TID PRN, Luis Felipe Starkehart, FNP    bisacodyL suppository 10 mg, 10 mg, Rectal, Daily PRN, Luis Felipe Starkehart, FNP    docusate sodium capsule 100 mg, 100 mg, Oral, BID, Luis Felipe Starkehart, FNP, 100 mg at 22    hydrALAZINE injection 10 mg,  10 mg, Intravenous, Q4H PRN, Luis Felipe A Julius, FNP    hydrOXYzine pamoate capsule 50 mg, 50 mg, Oral, Nightly PRN, Luis Felipe A Julius, FNP    labetalol 20 mg/4 mL (5 mg/mL) IV syring, 10 mg, Intravenous, Q4H PRN, Luis Felipe A Julius, FNP    methocarbamoL tablet 750 mg, 750 mg, Oral, QID, Luis Felipe A Julius, FNP, 750 mg at 11/07/22 0526    metoprolol injection 10 mg, 10 mg, Intravenous, Q2H PRN, Luis Felipe A Julius, FNP    mupirocin 2 % ointment, , Nasal, BID, Luis Felipe A Julius, FNP, Given at 11/06/22 2040    nitroGLYCERIN SL tablet 0.4 mg, 0.4 mg, Sublingual, Q5 Min PRN, Luis Felipe A Julius, FNP    ondansetron disintegrating tablet 4 mg, 4 mg, Oral, Q6H PRN, Luis Felipe A Julius, FNP    oxyCODONE immediate release tablet 5 mg, 5 mg, Oral, Q4H PRN, Luis Felipe A Julius, FNP, 5 mg at 11/06/22 1530    pantoprazole EC tablet 40 mg, 40 mg, Oral, Daily, Luis Felipe A Julius, FNP, 40 mg at 11/06/22 0754    polyethylene glycol packet 17 g, 17 g, Oral, BID PRN, Luis Felipe A Julius, FNP, 17 g at 11/06/22 2035    promethazine tablet 25 mg, 25 mg, Oral, Q6H PRN, Luis Felipe A Julius, FNP     Review of Systems   Complete 12-point review of symptoms negative except for what's mentioned in HPI     Objective:     /74   Pulse 92   Temp 98.3 °F (36.8 °C) (Oral)   Resp 16   Wt 76.4 kg (168 lb 6.9 oz)   SpO2 (!) 92%   BMI 26.38 kg/m²        Intake/Output Summary (Last 24 hours) at 11/7/2022 0706  Last data filed at 11/6/2022 1818  Gross per 24 hour   Intake 720 ml   Output 1500 ml   Net -780 ml         Physical Exam  Constitutional:       Appearance: Normal appearance.   HENT:      Head: Normocephalic.      Mouth/Throat:      Mouth: Mucous membranes are moist.   Eyes:      Pupils: Pupils are equal, round, and reactive to light.   Cardiovascular:      Rate and Rhythm: Normal rate and regular rhythm.      Heart sounds: Normal heart sounds.      Comments: Regular rate with intermittent pauses.  LLE 1+ edema  Pulmonary:       Effort: Pulmonary effort is normal.      Breath sounds: Normal breath sounds.   Abdominal:      General: Bowel sounds are normal.      Palpations: Abdomen is soft.   Genitourinary:     Comments: Indwelling catheter  Musculoskeletal:      Cervical back: Neck supple.      Comments: Generalized weakness and muscle atrophy.  Left hip dressing clean and intact-moderate swelling appreciated.    Skin:     General: Skin is warm and dry.   Neurological:      General: No focal deficit present.      Mental Status: She is alert and oriented to person, place, and time.   Psychiatric:         Mood and Affect: Mood normal.         Behavior: Behavior normal.         Thought Content: Thought content normal.         Judgment: Judgment normal.   *MD performed and documented physical examination         Lines/Drains/Airways       Drain  Duration                  Urethral Catheter 11/03/22 1053 16 Fr. 3 days              Peripheral Intravenous Line  Duration                  Peripheral IV - Single Lumen 11/04/22 1220 Right Antecubital 2 days         Peripheral IV - Single Lumen 11/04/22 1700 Posterior;Right Hand 2 days                    Labs  Recent Results (from the past 24 hour(s))   Phosphorus    Collection Time: 11/07/22  5:19 AM   Result Value Ref Range    Phosphorus Level 1.7 (L) 2.3 - 4.7 mg/dL   Comprehensive Metabolic Panel    Collection Time: 11/07/22  5:19 AM   Result Value Ref Range    Sodium Level 141 136 - 145 mmol/L    Potassium Level 4.4 3.5 - 5.1 mmol/L    Chloride 106 98 - 107 mmol/L    Carbon Dioxide 27 23 - 31 mmol/L    Glucose Level 115 82 - 115 mg/dL    Blood Urea Nitrogen 27.0 (H) 9.8 - 20.1 mg/dL    Creatinine 0.66 0.55 - 1.02 mg/dL    Calcium Level Total 8.1 (L) 8.4 - 10.2 mg/dL    Protein Total 5.2 (L) 5.8 - 7.6 gm/dL    Albumin Level 2.7 (L) 3.4 - 4.8 gm/dL    Globulin 2.5 2.4 - 3.5 gm/dL    Albumin/Globulin Ratio 1.1 1.1 - 2.0 ratio    Bilirubin Total 2.2 (H) <=1.5 mg/dL    Alkaline Phosphatase 197 (H) 40 -  150 unit/L    Alanine Aminotransferase 60 (H) 0 - 55 unit/L    Aspartate Aminotransferase 62 (H) 5 - 34 unit/L    eGFR >60 mls/min/1.73/m2   Magnesium    Collection Time: 11/07/22  5:19 AM   Result Value Ref Range    Magnesium Level 2.00 1.60 - 2.60 mg/dL   CBC with Differential    Collection Time: 11/07/22  5:19 AM   Result Value Ref Range    WBC 7.5 4.5 - 11.5 x10(3)/mcL    RBC 3.54 (L) 4.20 - 5.40 x10(6)/mcL    Hgb 10.1 (L) 12.0 - 16.0 gm/dL    Hct 31.2 (L) 37.0 - 47.0 %    MCV 88.1 80.0 - 94.0 fL    MCH 28.5 27.0 - 31.0 pg    MCHC 32.4 (L) 33.0 - 36.0 mg/dL    RDW 19.5 (H) 11.5 - 17.0 %    Platelet 204 130 - 400 x10(3)/mcL    MPV 9.8 7.4 - 10.4 fL    Neut % 64.3 %    Lymph % 17.1 %    Mono % 14.7 %    Eos % 3.1 %    Basophil % 0.3 %    Lymph # 1.29 0.6 - 4.6 x10(3)/mcL    Neut # 4.9 2.1 - 9.2 x10(3)/mcL    Mono # 1.11 0.1 - 1.3 x10(3)/mcL    Eos # 0.23 0 - 0.9 x10(3)/mcL    Baso # 0.02 0 - 0.2 x10(3)/mcL    IG# 0.04 0 - 0.04 x10(3)/mcL    IG% 0.5 %    NRBC% 0.0 %     Radiology  Left hip XR on 11/01/2022 at 9:34 a.m., IMPRESSION:Improved alignment following internal fixation of the left femur.    Assessment/Plan:     87 y.o. WF admitted on 11/3/2022    Left hip fracture   - s/p IMN on 11/01/2022  - WBAT to RLE  - staples to remove on 11/14/2022  - initiate   Lasix 40 mg daily x 3 days (initiated 11/7)  - continue                Oxycodone 5 mg q.4 hours p.r.n.                 Robaxin 750 mg q.i.d.                 Tylenol 650 mg t.i.d.  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  - continue                Eliquis 5 mg b.i.d.(resumed 11/7)     Normocytic anemia  - asymptomatic  - s/p transfusion   x2 units PRBC on 11/4/2022                x1 units PRBC on 11/3/2022  - H/H trending up  - no evidence of active bleeds  - will closely monitor and transfuse if needed      GERD  - Avoid spicy foods, and nothing to eat or drink  within x2 hours of bedtime or laying flat (water is ok)   - Avoid NSAIDs (Advil, ibuprofen, naproxen...) and shankar-2 inhibitors (Mobic, Celebrex)    - continue                Protonix 40 mg daily      Vitamin D deficiency  - vitamin-D level 32.0 on 11/05  - continue                Vitamin-D 5000 units daily      Constipation  - last BM 11/6  - continue  Colace 100 mg BID   Miralax 17 gm BID PRN     Urinary retention   - current   - Indwelling catheter reinserted 11/2   - plan to discontinue indwelling catheter 11/9     VTE Prophylaxis:  Eliquis 5 mg b.i.d.-resumed 11/7    COVID-19 testing:  Negative on 11/03/2022  COVID-19 vaccination status:  Vaccinated (Moderna):  07/23/2021 and 08/20/2021, and received 2 boosters     POA: Yes  Living will: Yes  Contacts: Lukas Joshua (son) [phone]                     Marya Gaytan (daughter) [phone]     CODE STATUS: Full Code  Internal Medicine (attending): Cb Fall MD  Physiatry (consulting):  Toño Polk MD     OUTPATIENT PROVIDERS  PCP:  King Duvall MD  Orthopedic surgery:  Florentino Friend MD     DISPOSITION: Condition stable.  Sleep hygiene, bowel maintenance, and appetite at goal.  Last BM 11/6.  Vital signs at goal with no recorded fevers.  H&H trending up.  Renal indices improving.  Replace phosphorus.  Initiate Lasix 40 mg IVP daily x3 days.  Repeat lab work in the morning.  Resume Eliquis 5 mg b.i.d..  Continue current POC.  Monitor closely.  Notify of acute changes.  Staffing completed today.    Staffing 11/7/2022: Continent of bowel.  Indwelling catheter to be discontinued on Wednesday. RT: Overall supervision.  Limited by left hip pain.  Improving.  Appetite is good OTW.  PT: Overall mod assist for bed mobility and transfers.  Ambulating 22 feet with walker.  OT: overall max to mod assist with LE dressing.  Limited by pain, balance, and endurance.  Needs more time. Projected discharge 11/17.     Brooks Madrid NP conducted independent physical  examination and assisted with medical documentation.    Total time spent on this encounter including chart review and direct MD + NP 1-on-1 patient interaction: 47 minutes   Over 50% of this time was spent in counseling and coordination of care

## 2022-11-07 NOTE — PT/OT/SLP PROGRESS
Recreational Therapy Treatment    Date of Treatment: 11/07/22  Start Time: 0900  Stop Time: 0930  Total Time: 30 min  Missed Time:    General Precautions: fall  Ortho Precautions: LLE weight bearing as tolerated  Braces: N/A    Vitals   Vitals at Rest  BP    HR    O2 Sat    Pain      Vitals With Activity  BP    HR    O2 Sat    Pain        Treatment     Cognitive Skills Building   Cognitive Observation Activity Assist Position Equipment Response            Comment:          Dynamic Activities   Activity Assist Position Equipment Response   Activity 1 Bean bag toos independence and modified independence Standing Rolling walker and Bean bags good   Comment: Sit to stand was setup as was dynamic standing balance/reaching. Standing tolerance was 7 minutes. UE coordination and sequencing skills were I.         Fine Motor Activities   Activity Assist Position Equipment Response           Comment:          Additional Info: Pt progress, plan of care and discharge plans were discussed during staffing at 0930    Goals     Short Term Goals    Goal  Goal Status   Will increase sit to stand to supervision Met   Will improve dynamic standing balance/reaching to supervision Met                 Long Term Goals    Goal Goal Status   Will increase standing tolerance to 5 mintues Met   Will improve dynamic standing balance/reaching to setup Met

## 2022-11-08 ENCOUNTER — TELEPHONE (OUTPATIENT)
Dept: INTERNAL MEDICINE | Facility: CLINIC | Age: 87
End: 2022-11-08
Payer: MEDICARE

## 2022-11-08 LAB
ANION GAP SERPL CALC-SCNC: 6 MEQ/L
BUN SERPL-MCNC: 29.3 MG/DL (ref 9.8–20.1)
CALCIUM SERPL-MCNC: 8.1 MG/DL (ref 8.4–10.2)
CHLORIDE SERPL-SCNC: 107 MMOL/L (ref 98–107)
CO2 SERPL-SCNC: 27 MMOL/L (ref 23–31)
CREAT SERPL-MCNC: 0.68 MG/DL (ref 0.55–1.02)
CREAT/UREA NIT SERPL: 43
GFR SERPLBLD CREATININE-BSD FMLA CKD-EPI: >60 MLS/MIN/1.73/M2
GLUCOSE SERPL-MCNC: 112 MG/DL (ref 82–115)
PHOSPHATE SERPL-MCNC: 2.3 MG/DL (ref 2.3–4.7)
POTASSIUM SERPL-SCNC: 4.3 MMOL/L (ref 3.5–5.1)
SODIUM SERPL-SCNC: 140 MMOL/L (ref 136–145)

## 2022-11-08 PROCEDURE — 97535 SELF CARE MNGMENT TRAINING: CPT

## 2022-11-08 PROCEDURE — 25000003 PHARM REV CODE 250: Performed by: NURSE PRACTITIONER

## 2022-11-08 PROCEDURE — 97116 GAIT TRAINING THERAPY: CPT | Mod: CQ

## 2022-11-08 PROCEDURE — 97110 THERAPEUTIC EXERCISES: CPT

## 2022-11-08 PROCEDURE — 94799 UNLISTED PULMONARY SVC/PX: CPT

## 2022-11-08 PROCEDURE — 11800000 HC REHAB PRIVATE ROOM

## 2022-11-08 PROCEDURE — 36415 COLL VENOUS BLD VENIPUNCTURE: CPT | Performed by: NURSE PRACTITIONER

## 2022-11-08 PROCEDURE — 94761 N-INVAS EAR/PLS OXIMETRY MLT: CPT

## 2022-11-08 PROCEDURE — 97530 THERAPEUTIC ACTIVITIES: CPT

## 2022-11-08 PROCEDURE — 84100 ASSAY OF PHOSPHORUS: CPT | Performed by: NURSE PRACTITIONER

## 2022-11-08 PROCEDURE — 80048 BASIC METABOLIC PNL TOTAL CA: CPT | Performed by: NURSE PRACTITIONER

## 2022-11-08 PROCEDURE — 63600175 PHARM REV CODE 636 W HCPCS: Performed by: NURSE PRACTITIONER

## 2022-11-08 RX ADMIN — PANTOPRAZOLE SODIUM 40 MG: 40 TABLET, DELAYED RELEASE ORAL at 08:11

## 2022-11-08 RX ADMIN — METHOCARBAMOL 750 MG: 750 TABLET ORAL at 10:11

## 2022-11-08 RX ADMIN — ACETAMINOPHEN 650 MG: 325 TABLET, FILM COATED ORAL at 08:11

## 2022-11-08 RX ADMIN — APIXABAN 5 MG: 5 TABLET, FILM COATED ORAL at 08:11

## 2022-11-08 RX ADMIN — POLYETHYLENE GLYCOL 3350 17 G: 17 POWDER, FOR SOLUTION ORAL at 08:11

## 2022-11-08 RX ADMIN — DOCUSATE SODIUM 100 MG: 100 CAPSULE, LIQUID FILLED ORAL at 08:11

## 2022-11-08 RX ADMIN — OXYCODONE HYDROCHLORIDE 5 MG: 5 TABLET ORAL at 08:11

## 2022-11-08 RX ADMIN — FUROSEMIDE 40 MG: 10 INJECTION, SOLUTION INTRAMUSCULAR; INTRAVENOUS at 08:11

## 2022-11-08 RX ADMIN — OXYCODONE HYDROCHLORIDE 5 MG: 5 TABLET ORAL at 02:11

## 2022-11-08 RX ADMIN — METHOCARBAMOL 750 MG: 750 TABLET ORAL at 04:11

## 2022-11-08 RX ADMIN — ACETAMINOPHEN 650 MG: 325 TABLET, FILM COATED ORAL at 02:11

## 2022-11-08 RX ADMIN — METHOCARBAMOL 750 MG: 750 TABLET ORAL at 07:11

## 2022-11-08 RX ADMIN — METHOCARBAMOL 750 MG: 750 TABLET ORAL at 12:11

## 2022-11-08 RX ADMIN — ACETAMINOPHEN 650 MG: 325 TABLET, FILM COATED ORAL at 07:11

## 2022-11-08 NOTE — PT/OT/SLP PROGRESS
Physical Therapy Inpatient Rehab Treatment    Patient Name:  Niecy Joshua   MRN:  [id number]    Recommendations:     Discharge Recommendations:      Discharge Equipment Recommendations: walker, rolling   Barriers to discharge: None    Assessment:     Niecy Joshua is a 87 y.o. female admitted with a medical diagnosis of Closed comminuted intertrochanteric fracture of left femur with routine healing.  She presents with the following impairments/functional limitations:   weakness, impaired endurance, impaired functional mobility, gait instability, impaired balance, decreased lower extremity function, decreased safety awareness, pain, decreased ROM, edema.    Rehab Diagnosis: L femur fx s/p IM nail    Recent Surgery: * No surgery found *      General Precautions: Standard, fall     Orthopedic Precautions:LLE weight bearing as tolerated     Braces: N/A    Rehab Prognosis: Good; patient would benefit from acute skilled PT services to address these deficits and reach maximum level of function.      History:     Past Medical History:   Diagnosis Date    Cardiac arrhythmia     Low vitamin D level     Osteoporosis     Other pulmonary embolism without acute cor pulmonale        Past Surgical History:   Procedure Laterality Date    BREAST MASS EXCISION      x3  benign    LAPAROSCOPIC REPAIR OF INGUINAL HERNIA Bilateral        Subjective     Chief Complaint: Fatigued    Respiratory Status: Room air    Patients cultural, spiritual, Alevism conflicts given the current situation: no      Objective:      Patient found up in chair with catheter  upon PT entry to room.    Pt is Oriented x3 and Alert, Cooperative, and Lethargic.    Vitals   Vitals at Rest  BP    HR    O2 Sat    Pain      Vitals With Activity  BP    HR    O2 Sat    Pain        Functional Mobility:   Transfers:     Sit to Stand: Supervision or touching assistance  with rolling walker  Gait: Pt ambulates 40feet + 42 feet with RW with Supervision or touching  assistance . Pt demo decreased B step, forward head posture and NBOS, vc to correct, distance limited to pain/decreased strength.   Impairments contributing to gait deviations include impaired coordination, decreased flexibility, pain, decreased ROM, and decreased strength.     Current   Status  Discharge   Goal   Functional Area: Care Score:    Roll Left and Right   Independent   Sit to Lying   Independent   Lying to Sitting on Side of Bed   Independent   Sit to Stand 4 Independent   Chair/Bed-to-Chair Transfer   Independent   Car Transfer   Supervision or touching assistance   Walk 10 Feet 4 Independent   Walk 50 Feet with Two Turns   Supervision or touching assistance   Walk 150 Feet   Supervision or touching assistance   Walk 10 Feet Uneven Surface   Supervision or touching assistance   1 Step (Curb)   Supervision or touching assistance   4 Steps   Supervision or touching assistance   12 Steps   Partial/moderate assistance   Picking Up Object   Independent   Wheel 50 Feet with Two Turns   Independent   Wheel 150 Feet   Independent       Therapeutic Activities and Exercises:  Supported sitting 2x15 B ankle pumps, 2x10 B marches, LAQs and hip AB/ADD.  Catheter care during tx, drained bag (700cc) notifed RN, managed line/loops  2 min static standing for wound care to attend to pts wound at conclusion of tx.        Activity Tolerance: Good    Patient left up in chair with  handoff to PT .    Education provided: roles and goals of PT/PTA, transfer training, safety awareness, and strengthening exercises    Expected compliance: High compliance    GOALS:   Multidisciplinary Problems       Physical Therapy Goals          Problem: Physical Therapy    Goal Priority Disciplines Outcome Goal Variances Interventions   Physical Therapy Goal     PT, PT/OT Ongoing, Progressing     Description: Bed Mobility:  Roll left and right with supervision/touching assist.  Sit to supine transfer with supervision/touching assist.  Supine to  sit transfer with supervision/touching assist.    Transfers:  Sit to stand transfer with supervision/touching assist using RW.  Bed to chair transfer with supervision/touching assist using RW.  Car transfer with partial/moderate assist using RW.   an object from the ground in standing position with supervision/touching assist using RW and reacher.    Mobility:  Ambulate 50 feet with supervision/touching assist using RW.  Ambulate 15 feet on uneven surfaces/ramps with partial/moderate assist using RW.  Ascend/descend a 4 inch curb with partial/moderate assist using RW.   Ascend/descend 5 stairs with partial/moderate assist using left handrail.                         Plan:     During this hospitalization, patient to be seen daily (5-7x/week) to address the identified rehab impairments via gait training, therapeutic exercises, therapeutic activities, wheelchair management/training and progress toward the following goals:    Plan of Care Expires:  11/10/22  PT Next Visit Date: 11/10/22  Plan of Care reviewed with: patient    Additional Information:         Time Tracking:     Therapy Time  PT Start Time: 1000  PT Stop Time: 1100  PT Total Time (min): 60 min   PT Individual: 60  Missed Time:    Time Missed due to:      Billable Minutes: Gait Training 30, Therapeutic Activity 15, and Therapeutic Exercise 15    11/08/2022

## 2022-11-08 NOTE — PROGRESS NOTES
[facility] ([facility])  Rehab Progress Note    Patient Name: Niecy Joshua  MRN: [id number]  Age: 87 y.o. Sex: female  : 1935  Hospital Length of Stay: 5 days  Date of Service: 2022   Chief Complaint: Left hip fracture s/p IMN on 2022    Subjective:     Basic Information  Admit Information: 87-year-old white female presented to [facility] ED on 10/31/2022 complaining of left hip pain after falling from ground level.  PMH significant for unprovoked DVT diagnosed in  and osteoporosis.  Work up significant for hip fracture.  Tolerated left IMN on  without perioperative complications.  Orthopedic surgery recommended WBAT to [facility].  H&H continues to trend downward with hemoglobin 7.1 requiring 1 unit PRBC transfusion.  Tolerated transfer to [facility] inpatient rehab unit on  without incident.  Today's Information: No acute events overnight.  Up in chair.  Reports good sleep and appetite.  Last BM .  Vital signs at goal with no recorded fevers.  BMP unremarkable.  Phosphorus 2.3.  No imaging today.    Review of patient's allergies indicates:  No Known Allergies     Current Facility-Administered Medications:     0.9%  NaCl infusion (for blood administration), , Intravenous, Q24H PRN, Luis Felipe A Julius, FNP    acetaminophen tablet 650 mg, 650 mg, Oral, Q4H PRN, Luis Felipe A Julius, FNP    acetaminophen tablet 650 mg, 650 mg, Oral, TID, Dilcia Ragland, FNP, 650 mg at 22 1424    ALPRAZolam tablet 0.25 mg, 0.25 mg, Oral, TID PRN, Luis Felipe A Julius, FNP    apixaban tablet 5 mg, 5 mg, Oral, BID, Luis Felipe A Julius, FNP, 5 mg at 22 0829    benzonatate capsule 100 mg, 100 mg, Oral, TID PRN, Luis Felipe A Julius, FNP    bisacodyL suppository 10 mg, 10 mg, Rectal, Daily PRN, Luis Felipe A Julius, FNP    docusate sodium capsule 100 mg, 100 mg, Oral, BID, Luis Felipe A Julius, FNP, 100 mg at 22 0829    furosemide injection 40 mg, 40 mg, Intravenous, Daily,  Luis Felipe A Julius, FNP, 40 mg at 11/08/22 0829    hydrALAZINE injection 10 mg, 10 mg, Intravenous, Q4H PRN, Luis Felipe A Julius, FNP    hydrOXYzine pamoate capsule 50 mg, 50 mg, Oral, Nightly PRN, Luis Felipe A Julius, FNP    labetalol 20 mg/4 mL (5 mg/mL) IV syring, 10 mg, Intravenous, Q4H PRN, Luis Felipe A Julius, FNP    methocarbamoL tablet 750 mg, 750 mg, Oral, QID, Luis Felipe A Julius, FNP, 750 mg at 11/08/22 1200    metoprolol injection 10 mg, 10 mg, Intravenous, Q2H PRN, Luis Felipe A Julius, FNP    nitroGLYCERIN SL tablet 0.4 mg, 0.4 mg, Sublingual, Q5 Min PRN, Luis Felipe A Julius, FNP    ondansetron disintegrating tablet 4 mg, 4 mg, Oral, Q6H PRN, Luis Felipe A Julius, FNP    oxyCODONE immediate release tablet 5 mg, 5 mg, Oral, Q4H PRN, Luis Felipe A Julius, FNP, 5 mg at 11/08/22 1424    pantoprazole EC tablet 40 mg, 40 mg, Oral, Daily, Luis Felipe A Julius, FNP, 40 mg at 11/08/22 0829    polyethylene glycol packet 17 g, 17 g, Oral, BID PRN, Luis Felipe A Julius, FNP, 17 g at 11/08/22 0829    promethazine tablet 25 mg, 25 mg, Oral, Q6H PRN, Luis Felipe A Julius, FNP     Review of Systems   Complete 12-point review of symptoms negative except for what's mentioned in HPI     Objective:     /65   Pulse 80   Temp 97.8 °F (36.6 °C) (Oral)   Resp 18   Wt 76.4 kg (168 lb 6.9 oz)   SpO2 100%   BMI 26.38 kg/m²        Intake/Output Summary (Last 24 hours) at 11/8/2022 1541  Last data filed at 11/8/2022 1500  Gross per 24 hour   Intake 360 ml   Output 950 ml   Net -590 ml         Physical Exam  Constitutional:       Appearance: Normal appearance.   HENT:      Head: Normocephalic.      Mouth/Throat:      Mouth: Mucous membranes are moist.   Eyes:      Pupils: Pupils are equal, round, and reactive to light.   Cardiovascular:      Rate and Rhythm: Normal rate and regular rhythm.      Heart sounds: Normal heart sounds.      Comments: Regular rate with intermittent pauses.  LLE 1+ edema  Pulmonary:      Effort:  Pulmonary effort is normal.      Breath sounds: Normal breath sounds.   Abdominal:      General: Bowel sounds are normal.      Palpations: Abdomen is soft.   Genitourinary:     Comments: Indwelling catheter  Musculoskeletal:      Cervical back: Neck supple.      Comments: Generalized weakness and muscle atrophy.  Left hip dressing clean and intact-moderate swelling appreciated.    Skin:     General: Skin is warm and dry.   Neurological:      General: No focal deficit present.      Mental Status: She is alert and oriented to person, place, and time.   Psychiatric:         Mood and Affect: Mood normal.         Behavior: Behavior normal.         Thought Content: Thought content normal.         Judgment: Judgment normal.   *MD performed and documented physical examination         Lines/Drains/Airways       Drain  Duration                  Urethral Catheter 11/03/22 1053 16 Fr. 5 days              Peripheral Intravenous Line  Duration                  Peripheral IV - Single Lumen 11/04/22 1700 Posterior;Right Hand 3 days                    Labs  Recent Results (from the past 24 hour(s))   Basic Metabolic Panel    Collection Time: 11/08/22  5:59 AM   Result Value Ref Range    Sodium Level 140 136 - 145 mmol/L    Potassium Level 4.3 3.5 - 5.1 mmol/L    Chloride 107 98 - 107 mmol/L    Carbon Dioxide 27 23 - 31 mmol/L    Glucose Level 112 82 - 115 mg/dL    Blood Urea Nitrogen 29.3 (H) 9.8 - 20.1 mg/dL    Creatinine 0.68 0.55 - 1.02 mg/dL    BUN/Creatinine Ratio 43     Calcium Level Total 8.1 (L) 8.4 - 10.2 mg/dL    Anion Gap 6.0 mEq/L    eGFR >60 mls/min/1.73/m2   Phosphorus    Collection Time: 11/08/22  5:59 AM   Result Value Ref Range    Phosphorus Level 2.3 2.3 - 4.7 mg/dL     Radiology  Left hip XR on 11/01/2022 at 9:34 a.m., IMPRESSION:Improved alignment following internal fixation of the left femur.    Assessment/Plan:     87 y.o. WF admitted on 11/3/2022    Left hip fracture   - s/p IMN on 11/01/2022  - WBAT to [facility]  -  staples to remove on 11/14/2022  - continue   Lasix 40 mg daily x 3 days (initiated 11/7)  - continue                Oxycodone 5 mg q.4 hours p.r.n.                 Robaxin 750 mg q.i.d.                 Tylenol 650 mg t.i.d.  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  - continue                Eliquis 5 mg b.i.d.(resumed 11/7)     Normocytic anemia  - asymptomatic  - s/p transfusion   x2 units PRBC on 11/4/2022                x1 units PRBC on 11/3/2022  - H/H trending up  - no evidence of active bleeds  - will closely monitor and transfuse if needed      GERD  - Avoid spicy foods, and nothing to eat or drink within x2 hours of bedtime or laying flat (water is ok)   - Avoid NSAIDs (Advil, ibuprofen, naproxen...) and shankar-2 inhibitors (Mobic, Celebrex)    - continue                Protonix 40 mg daily      Vitamin D deficiency  - vitamin-D level 32.0 on 11/05  - continue                Vitamin-D 5000 units daily      Constipation  - last BM 11/6  - continue  Colace 100 mg BID   Miralax 17 gm BID PRN     Urinary retention   - current   - Indwelling catheter reinserted 11/2   - plan to discontinue indwelling catheter 11/9     VTE Prophylaxis:  Eliquis 5 mg b.i.d.-resumed 11/7    COVID-19 testing:  Negative on 11/03/2022  COVID-19 vaccination status:  Vaccinated (Moderna):  07/23/2021 and 08/20/2021, and received 2 boosters     POA: Yes  Living will: Yes  Contacts: Lukas Joshua (son) [phone]                     Marya Gaytan (daughter) [phone]     CODE STATUS: Full Code  Internal Medicine (attending): Cb Fall MD  Physiatry (consulting):  Toño Polk MD     OUTPATIENT PROVIDERS  PCP:  King Duvall MD  Orthopedic surgery:  Florentino Friend MD     DISPOSITION: Condition stable.  Sleep hygiene, bowel maintenance, and appetite at goal.  Last BM 11/6.  Vital signs at goal with no recorded fevers.  BMP  unremarkable.  Phosphorus 2.3-trending up.  Continue current POC.  Monitor closely.  Notify of acute changes.    Staffing 11/7/2022: Continent of bowel.  Indwelling catheter to be discontinued on Wednesday. RT: Overall supervision.  Limited by left hip pain.  Improving.  Appetite is good OTW.  PT: Overall mod assist for bed mobility and transfers.  Ambulating 22 feet with walker.  OT: overall max to mod assist with LE dressing.  Limited by pain, balance, and endurance.  Needs more time. Projected discharge 11/17.     Brooks Madrid NP conducted independent physical examination and assisted with medical documentation.    Total time spent on this encounter including chart review and direct MD + NP 1-on-1 patient interaction: 37 minutes   Over 50% of this time was spent in counseling and coordination of care

## 2022-11-08 NOTE — PLAN OF CARE
Problem: Physical Therapy  Goal: Physical Therapy Goal  Description: Bed Mobility:  Roll left and right with supervision/touching assist.  Sit to supine transfer with supervision/touching assist.  Supine to sit transfer with supervision/touching assist.    Transfers:  Sit to stand transfer with supervision/touching assist using RW. MET  Bed to chair transfer with supervision/touching assist using RW.  Car transfer with partial/moderate assist using RW.   an object from the ground in standing position with supervision/touching assist using RW and reacher.    Mobility:  Ambulate 50 feet with supervision/touching assist using RW.  Ambulate 15 feet on uneven surfaces/ramps with partial/moderate assist using RW.  Ascend/descend a 4 inch curb with partial/moderate assist using RW.   Ascend/descend 5 stairs with partial/moderate assist using left handrail.    Outcome: Ongoing, Progressing

## 2022-11-08 NOTE — PLAN OF CARE
Son Lukas called and scheduled fly training for him and his 2 siblings for Mon, 11/14, at 1300.      Still researching HH options.

## 2022-11-08 NOTE — TELEPHONE ENCOUNTER
----- Message from Kristen Morales sent at 11/8/2022 10:37 AM CST -----  Regarding: Recommendation on home health  Pt son Lukas called and stated [facility], pt fell and broke her left femur, states she will need a home health up on d/c on Nov 17th asked any recommendations, because they do not have a preference.. please advise and contact pt son    [phone] Lukas

## 2022-11-08 NOTE — PT/OT/SLP PROGRESS
Physical Therapy Inpatient Rehab Treatment    Patient Name:  Niecy Joshua   MRN:  [id number]    Recommendations:     Discharge Recommendations:      Discharge Equipment Recommendations: walker, rolling   Barriers to discharge: Decreased caregiver support    Assessment:     Niecy Joshua is a 87 y.o. female admitted with a medical diagnosis of Closed comminuted intertrochanteric fracture of left femur with routine healing.  She presents with the following impairments/functional limitations:  weakness, impaired endurance, impaired functional mobility, gait instability, impaired balance, decreased lower extremity function, decreased safety awareness, pain, decreased ROM, edema.    Rehab Diagnosis: L femur fx s/p IM nail    Recent Surgery: * No surgery found *      General Precautions: Standard, fall     Orthopedic Precautions:LLE weight bearing as tolerated     Braces: N/A    Rehab Prognosis: Good; patient would benefit from acute skilled PT services to address these deficits and reach maximum level of function.      History:     Past Medical History:   Diagnosis Date    Cardiac arrhythmia     Low vitamin D level     Osteoporosis     Other pulmonary embolism without acute cor pulmonale        Past Surgical History:   Procedure Laterality Date    BREAST MASS EXCISION      x3  benign    LAPAROSCOPIC REPAIR OF INGUINAL HERNIA Bilateral        Subjective     Chief Complaint: Aching pain in L hip    Respiratory Status: Room air    Patients cultural, spiritual, Moravian conflicts given the current situation: no      Objective:     Communicated with RN prior to session.  Patient found up in chair with peripheral IV, see catheter  upon PT entry to room.    Pt is Oriented x3 and Alert, Cooperative, and Motivated.    Vitals     Functional Mobility:        Current   Status   Discharge   Goal   Functional Area: Care Score:     Roll Left and Right    Independent   Sit to Lying   Independent   Lying to Sitting on Side of  Bed   Independent   Sit to Stand 4 With RW; overall CGA to trunk; x 5 trials Independent   Chair/Bed-to-Chair Transfer   Independent   Car Transfer    Supervision or touching assistance   Walk 10 Feet   Independent   Walk 50 Feet with Two Turns    Supervision or touching assistance   Walk 150 Feet    Supervision or touching assistance   Walk 10 Feet Uneven Surface    Supervision or touching assistance   1 Step (Curb)    Supervision or touching assistance   4 Steps    Supervision or touching assistance   12 Steps    Partial/moderate assistance   Picking Up Object    Independent   Wheel 50 Feet with Two Turns   Independent   Wheel 150 Feet   Independent       Therapeutic Activities and Exercises:  Upper extremity bike: seated in w/c with BUE support; 5/5 BUE    Activity Tolerance: Good    Patient left up in chair with all lines intact, call button in reach, and CNA present.    Education provided: roles and goals of PT/PTA, safety awareness, body mechanics, assistive device, strengthening exercises, and fall prevention    Expected compliance: High compliance    GOALS:   Multidisciplinary Problems       Physical Therapy Goals          Problem: Physical Therapy    Goal Priority Disciplines Outcome Goal Variances Interventions   Physical Therapy Goal     PT, PT/OT Ongoing, Progressing     Description: Bed Mobility:  Roll left and right with supervision/touching assist.  Sit to supine transfer with supervision/touching assist.  Supine to sit transfer with supervision/touching assist.    Transfers:  Sit to stand transfer with supervision/touching assist using RW.  Bed to chair transfer with supervision/touching assist using RW.  Car transfer with partial/moderate assist using RW.   an object from the ground in standing position with supervision/touching assist using RW and reacher.    Mobility:  Ambulate 50 feet with supervision/touching assist using RW.  Ambulate 15 feet on uneven surfaces/ramps with  partial/moderate assist using RW.  Ascend/descend a 4 inch curb with partial/moderate assist using RW.   Ascend/descend 5 stairs with partial/moderate assist using left handrail.                         Plan:     During this hospitalization, patient to be seen daily (5-7x/week) to address the identified rehab impairments via gait training, therapeutic exercises, therapeutic activities, wheelchair management/training and progress toward the following goals:    Plan of Care Expires:  11/10/22  PT Next Visit Date: 11/10/22  Plan of Care reviewed with: patient    Additional Information:         Time Tracking:     Therapy Time  PT Received On: 11/08/22  PT Start Time: 1300  PT Stop Time: 1330  PT Total Time (min): 30 min   PT Individual: 30  Missed Time:    Time Missed due to:      Billable Minutes: Therapeutic Exercise 30    11/08/2022

## 2022-11-08 NOTE — TELEPHONE ENCOUNTER
Pt's son requested advice on choosing a Home Health Facility for pt after d/c for broken femur. Advised pt's son to request a list of facilities in insurance network. Advised to call back if any further questions.

## 2022-11-08 NOTE — PT/OT/SLP PROGRESS
Physical Therapy Inpatient Rehab Treatment    Patient Name:  Niecy Joshua   MRN:  [id number]    Recommendations:     Discharge Recommendations:      Discharge Equipment Recommendations: walker, rolling   Barriers to discharge: None    Assessment:     Niecy Joshua is a 87 y.o. female admitted with a medical diagnosis of Closed comminuted intertrochanteric fracture of left femur with routine healing.  She presents with the following impairments/functional limitations:  weakness, impaired endurance, impaired functional mobility, gait instability, impaired balance, decreased lower extremity function, decreased safety awareness, pain, decreased ROM, edema.    Rehab Diagnosis: L femur fx s/p IM nail    Recent Surgery: * No surgery found *      General Precautions: Standard, fall     Orthopedic Precautions:LLE weight bearing as tolerated     Braces: N/A    Rehab Prognosis: Good; patient would benefit from acute skilled PT services to address these deficits and reach maximum level of function.      History:     Past Medical History:   Diagnosis Date    Cardiac arrhythmia     Low vitamin D level     Osteoporosis     Other pulmonary embolism without acute cor pulmonale        Past Surgical History:   Procedure Laterality Date    BREAST MASS EXCISION      x3  benign    LAPAROSCOPIC REPAIR OF INGUINAL HERNIA Bilateral        Subjective     Chief Complaint: Stabbing pain in anterior L hip    Respiratory Status: Room air    Patients cultural, spiritual, Confucianism conflicts given the current situation: no      Objective:     Communicated with RN prior to session.  Patient found up in chair with peripheral IV, see catheter  upon PT entry to room.    Pt is Oriented x3 and Alert, Cooperative, and Motivated.    Vitals     Functional Mobility:        Current   Status   Discharge   Goal   Functional Area: Care Score:     Roll Left and Right    Independent   Sit to Lying 3 Performed on Mat; overall Mod A to BLE; use of leg  ; PT educated Pt on proper use of leg  Independent   Lying to Sitting on Side of Bed 4 Performed on Mat; overall CGA to trunk; use of leg  Independent   Sit to Stand 4 With RW; overall CGA to trunk Independent   Chair/Bed-to-Chair Transfer 4 W/c <> Mat using stand step t/f with RW; overall CGA to trunk Independent   Car Transfer    Supervision or touching assistance   Walk 10 Feet   Independent   Walk 50 Feet with Two Turns    Supervision or touching assistance   Walk 150 Feet    Supervision or touching assistance   Walk 10 Feet Uneven Surface    Supervision or touching assistance   1 Step (Curb)    Supervision or touching assistance   4 Steps    Supervision or touching assistance   12 Steps    Partial/moderate assistance   Picking Up Object    Independent   Wheel 50 Feet with Two Turns 4  Independent   Wheel 150 Feet 4 ~350 ft with standard w/c using BUE propulsion; overall supervision Independent       Therapeutic Activities and Exercises:  Supine TherX: performed on Mat; 3 x 10 LLE  Quad set  Glute set  TKE on bolster  Hip and knee flexion  Hip abd/add   Ankle pumps      Activity Tolerance: Good    Patient left up in chair with all lines intact, call button in reach, and chair alarm on.    Education provided: roles and goals of PT/PTA, transfer training, bed mob, balance training, safety awareness, body mechanics, assistive device, strengthening exercises, and fall prevention    Expected compliance: High compliance    GOALS:   Multidisciplinary Problems       Physical Therapy Goals          Problem: Physical Therapy    Goal Priority Disciplines Outcome Goal Variances Interventions   Physical Therapy Goal     PT, PT/OT Ongoing, Progressing     Description: Bed Mobility:  Roll left and right with supervision/touching assist.  Sit to supine transfer with supervision/touching assist.  Supine to sit transfer with supervision/touching assist.    Transfers:  Sit to stand transfer with  supervision/touching assist using RW.  Bed to chair transfer with supervision/touching assist using RW.  Car transfer with partial/moderate assist using RW.   an object from the ground in standing position with supervision/touching assist using RW and reacher.    Mobility:  Ambulate 50 feet with supervision/touching assist using RW.  Ambulate 15 feet on uneven surfaces/ramps with partial/moderate assist using RW.  Ascend/descend a 4 inch curb with partial/moderate assist using RW.   Ascend/descend 5 stairs with partial/moderate assist using left handrail.                         Plan:     During this hospitalization, patient to be seen daily (5-7x/week) to address the identified rehab impairments via gait training, therapeutic exercises, therapeutic activities, wheelchair management/training and progress toward the following goals:    Plan of Care Expires:  11/10/22  PT Next Visit Date: 11/10/22  Plan of Care reviewed with: patient    Additional Information:         Time Tracking:     Therapy Time  PT Received On: 11/08/22  PT Start Time: 1100  PT Stop Time: 1200  PT Total Time (min): 60 min   PT Individual: 60  Missed Time:    Time Missed due to:      Billable Minutes: Therapeutic Activity 15 and Therapeutic Exercise 45    11/08/2022

## 2022-11-08 NOTE — PT/OT/SLP PROGRESS
Pt was not seen for Therapy due to schedule conflict    Pt progress, plan of care and discharge plans were discussed during staffing at 0900

## 2022-11-08 NOTE — PT/OT/SLP PROGRESS
Occupational Therapy Inpatient Rehab Treatment    Name: Niecy Joshua  MRN: [id number]    Assessment:  Niecy Joshua is a 87 y.o. female admitted with a medical diagnosis of Closed comminuted intertrochanteric fracture of left femur with routine healing.  She presents with the following impairments/functional limitations:  weakness, impaired endurance, impaired self care skills, impaired functional mobility, gait instability, impaired balance, decreased lower extremity function, pain, decreased ROM, orthopedic precautions.    General Precautions: Standard, fall     Orthopedic Precautions:LLE weight bearing as tolerated     Braces: N/A    Rehab Prognosis: Good; patient would benefit from acute skilled OT services to address these deficits and reach maximum level of function.      History:     Past Medical History:   Diagnosis Date    Cardiac arrhythmia     Low vitamin D level     Osteoporosis     Other pulmonary embolism without acute cor pulmonale        Past Surgical History:   Procedure Laterality Date    BREAST MASS EXCISION      x3  benign    LAPAROSCOPIC REPAIR OF INGUINAL HERNIA Bilateral        Subjective     Orientation: Oriented x4    Chief Complaint: pain limiting performance    Patient/Family Comments/goals: return to PLOF    Vitals       Respiratory Status: Room air    Patients cultural, spiritual, Sikh conflicts given the current situation: no       Objective:     Patient found up in chair with see catheter  upon OT entry to room.    Mobility   Patient completed:  Sit to Stand Transfer with supervision with rolling walker  Stand to Sit Transfer with supervision with rolling walker    Functional Mobility      ADLs   Current Status   Eating     Oral Hygiene     Shower, Bathe Self     Upper Body Dressing     Lower Body Dressing     Toileting Hygiene     Toilet Transfer     Putting On, Taking Off Footwear 3 using LH shoehorn, reacher and sock aide     Limiting Factors for ADLs: motor, limited  ROM, balance, and weakness       Therapeutic Activities  Performed dynamic standing balance activity with focus on weight shifting and hip flexion needed to cross WIS threshold. Pt reports she has a sliding door, so a TTB would not work, she will need to step over threshold. Patient only able to lift LLE 1.5-2 inches, will need to lift ~4 inches from the floor for threshold clearance.         LifeStyle Change and Education:             Patient left up in chair with all lines intact and call button in reach.     Education provided: Roles and goals of OT, ADLs, transfer training, and fall prevention    Multidisciplinary Problems       Occupational Therapy Goals          Problem: Occupational Therapy    Goal Priority Disciplines Outcome Interventions   Occupational Therapy Goal     OT, PT/OT Ongoing, Progressing    Description: ADLs:  Pt to perform grooming tasks with set up and standing at the sink.  Pt to perform UB dressing with set up   Pt to perform LB dressing with Partial/mod assist with AE as needed.  Pt to perform putting on/off footwear task with Max assist with AE as needed  Pt to perform toileting with partial/mod assist    Functional Transfers:  Pt to perform toilet transfers with partial/mod assist and RW  Pt to perform a tub transfer with partial/mod assist and RW/TTB  Pt to perform a walk-in shower transfer with partial/mod assist and RW    IADLs:  Pt to perform simple meal prep with partial/mod assist with RW     Balance, Strengthening, Endurance, Balance:  Pt to consistently demonstrate adherence to orthopedic precautions during all ADL's as instructed by OT.  Pt to demonstrate fair dynamic standing balance as required to perform ADL's from standing level.  Pt to demonstrate good BUE strength during functional task   Pt to demonstrate consistent adherence to breathing control and energy conservation techniques as educated by OT.                        Time Tracking     OT Received On: 11/08/22  Time  In 1430     Time Out 1500  Total Time 30 min  Therapy Time: OT Individual: 30  Missed Time:    Missed Time Reason:      Billable Minutes: Self Care/Home Management 30    11/08/2022

## 2022-11-09 PROCEDURE — 99900035 HC TECH TIME PER 15 MIN (STAT)

## 2022-11-09 PROCEDURE — 94761 N-INVAS EAR/PLS OXIMETRY MLT: CPT

## 2022-11-09 PROCEDURE — 97535 SELF CARE MNGMENT TRAINING: CPT

## 2022-11-09 PROCEDURE — 97116 GAIT TRAINING THERAPY: CPT | Mod: CQ

## 2022-11-09 PROCEDURE — 99233 PR SUBSEQUENT HOSPITAL CARE,LEVL III: ICD-10-PCS | Mod: ,,, | Performed by: NURSE PRACTITIONER

## 2022-11-09 PROCEDURE — 94799 UNLISTED PULMONARY SVC/PX: CPT

## 2022-11-09 PROCEDURE — 97530 THERAPEUTIC ACTIVITIES: CPT | Mod: CQ

## 2022-11-09 PROCEDURE — 25000003 PHARM REV CODE 250: Performed by: NURSE PRACTITIONER

## 2022-11-09 PROCEDURE — 99233 SBSQ HOSP IP/OBS HIGH 50: CPT | Mod: ,,, | Performed by: NURSE PRACTITIONER

## 2022-11-09 PROCEDURE — 11800000 HC REHAB PRIVATE ROOM

## 2022-11-09 PROCEDURE — 63600175 PHARM REV CODE 636 W HCPCS: Performed by: NURSE PRACTITIONER

## 2022-11-09 PROCEDURE — 97168 OT RE-EVAL EST PLAN CARE: CPT

## 2022-11-09 PROCEDURE — 97110 THERAPEUTIC EXERCISES: CPT | Mod: CQ

## 2022-11-09 PROCEDURE — 97110 THERAPEUTIC EXERCISES: CPT

## 2022-11-09 RX ADMIN — METHOCARBAMOL 750 MG: 750 TABLET ORAL at 05:11

## 2022-11-09 RX ADMIN — ACETAMINOPHEN 650 MG: 325 TABLET, FILM COATED ORAL at 03:11

## 2022-11-09 RX ADMIN — APIXABAN 5 MG: 5 TABLET, FILM COATED ORAL at 08:11

## 2022-11-09 RX ADMIN — ACETAMINOPHEN 650 MG: 325 TABLET, FILM COATED ORAL at 05:11

## 2022-11-09 RX ADMIN — FUROSEMIDE 60 MG: 20 TABLET ORAL at 12:11

## 2022-11-09 RX ADMIN — METHOCARBAMOL 750 MG: 750 TABLET ORAL at 10:11

## 2022-11-09 RX ADMIN — DOCUSATE SODIUM 100 MG: 100 CAPSULE, LIQUID FILLED ORAL at 08:11

## 2022-11-09 RX ADMIN — METHOCARBAMOL 750 MG: 750 TABLET ORAL at 12:11

## 2022-11-09 RX ADMIN — OXYCODONE HYDROCHLORIDE 5 MG: 5 TABLET ORAL at 03:11

## 2022-11-09 RX ADMIN — OXYCODONE HYDROCHLORIDE 5 MG: 5 TABLET ORAL at 08:11

## 2022-11-09 RX ADMIN — DOCUSATE SODIUM 100 MG: 100 CAPSULE, LIQUID FILLED ORAL at 09:11

## 2022-11-09 RX ADMIN — ACETAMINOPHEN 650 MG: 325 TABLET, FILM COATED ORAL at 10:11

## 2022-11-09 RX ADMIN — FUROSEMIDE 40 MG: 10 INJECTION, SOLUTION INTRAMUSCULAR; INTRAVENOUS at 09:11

## 2022-11-09 RX ADMIN — PANTOPRAZOLE SODIUM 40 MG: 40 TABLET, DELAYED RELEASE ORAL at 09:11

## 2022-11-09 RX ADMIN — APIXABAN 5 MG: 5 TABLET, FILM COATED ORAL at 09:11

## 2022-11-09 NOTE — PROGRESS NOTES
Subjective:  HPI: 87 y.o. WF with PMH of DVT, osteoporosis, and vitamin-D deficiency presented to [facility] ED on 10/31/2022 for mechanical fall.  Patient reports she was walking to the bathroom when she fell onto her left hip.  Patient denies hitting head, LOC, chest pain, shortness of breath, abdominal pain, nausea, vomiting, dizziness, syncope, and fever.  Patient reports improvement of left hip pain after pain medications given in ED. Initial vital signs in the ED were /68, pulse 70, respirations 16, temperature 36.8° C, and SpO2 97%.  Labs revealed WBC 14.8, chloride 109, BUN 22.4, and glucose 148.  Chest x-ray revealed no acute pulmonary process.  Left hip x-ray revealed comminuted left intertrochanteric femur fracture.  Orthopedics was consulted with plans for surgery. Patient was admitted to hospital medicine service for further medical management. She underwent left intertrochanteric femur fracture intramedullary nailing on 11/1 with Dr. Friend. Participating with therapy. Functional status includes bed mobility and transfers requiring . Amb w/RW for . Patient was evaluated, accepted, and admitted to inpatient rehab to improve functional status. Transferred to [facility] on / without incident.    11/9: Seen with PT, Amb w/RW. States that it is so hard. VC for wider steps as she is walking a very narrow line. IM discontinuing Mccarty catheter today to rule out continued urinary retention. Tolerating therapy without complaint. VSSAF.     Review of Systems  Depression/Anxiety: no complaints     ALPRAZolam tablet 0.25 mg TID PRN Anxiety  Pain: left hip with movement-controlled     acetaminophen tablet 650 mg TID  methocarbamoL tablet 750 mg TID  acetaminophen tablet 650 mg q4hr PRN mild pain  oxyCODONE immediate release tablet 5 mg q4hr PRN mod pain  Bowels/Bladder: last BM 11/8, 11/6 following enema, 11/5 following suppository- not feeling fully evacuated; 11/3 (loose, incontinent-small amount) DC Mccarty 11/9    Appetite: decreased from baseline-improving    Sleep: good          Physical Exam  General: well-developed, well-nourished, in no acute distress  Respiratory: equal chest rise, no SOB, no audible wheeze  Cardiovascular: regular rate and rhythm, BLE edema  Gastrointestinal: soft, non-tender, non-distended   Musculoskeletal: decreased ROM/strength to LLE  Integumentary: no rashes or skin lesions present, Left hip incisions x 4-staples, dressings-drainage saturated dressing   Neurologic: cranial nerves intact, no signs of peripheral neurological deficit, motor/sensory function intact    Labs:   Latest Reference Range & Units 11/08/22 05:59   Sodium 136 - 145 mmol/L 140   Potassium 3.5 - 5.1 mmol/L 4.3   Chloride 98 - 107 mmol/L 107   CO2 23 - 31 mmol/L 27   Anion Gap mEq/L 6.0   BUN 9.8 - 20.1 mg/dL 29.3 (H)   Creatinine 0.55 - 1.02 mg/dL 0.68   BUN/CREAT RATIO  43   eGFR mls/min/1.73/m2 >60   Glucose 82 - 115 mg/dL 112   Calcium 8.4 - 10.2 mg/dL 8.1 (L)   Phosphorus 2.3 - 4.7 mg/dL 2.3   (H): Data is abnormally high  (L): Data is abnormally low    Assessment/Plan  Hospital   Closed comminuted intertrochanteric fracture of left femur     Non-Hospital   Osteoporosis   Cardiac arrhythmia   Other pulmonary embolism without acute cor pulmonale   Chronic deep vein thrombosis (DVT) of distal vein of lower extremity   Low vitamin D level       Wounds: Left hip incisions x 4-staples, dressings-drainage saturated dressing   S/p left intertrochanteric femur fracture intramedullary nailing on 11/1 with Dr. Friend  Precautions: WBAT LLE  Bracing/AD: RW  Swallowing: Regular Diet  Function: Tolerating therapy. Continue PT/OT  VTE Prophylaxis: SCDs. Eliquis on Hold. Receiving Blood.   Code Status: FULL CODE   Discharge:Lives alone in the [place] in a single-story home with 4-5 steps and bilateral rails to enter the residence. She completed high school. No  history. She is a retired . Patient is . She  lives alone and was completely independent. Pts son lives behind her. He states that they have good family support and will be able to assist her after rehab.    Children (4). Date 11/17 Thursday.

## 2022-11-09 NOTE — PT/OT/SLP PROGRESS
Recreational Therapy Treatment    Date of Treatment: 11/09/22  Start Time: 0900  Stop Time: 0930  Total Time: 30 min  Missed Time:    General Precautions: fall  Ortho Precautions: LLE weight bearing as tolerated  Braces: N/A    Vitals   Vitals at Rest  BP    HR    O2 Sat    Pain 4/10     Vitals With Activity  BP    HR    O2 Sat    Pain 4/10       Treatment     Cognitive Skills Building   Cognitive Observation Activity Assist Position Equipment Response            Comment:          Dynamic Activities   Activity Assist Position Equipment Response   Activity 1 Bean bag toos modified independence Standing Rolling walker and Bocce balls good   Comment: Sit to stand was setup as was dynamic standing balance/reaching. Standing tolerance was 3 minutes. UE coordination was I as wer sequencing skills. Cooperative        Fine Motor Activities   Activity Assist Position Equipment Response           Comment:          Additional Info: Pt c/o L hip pain 4/10.    Goals     Short Term Goals    Goal  Goal Status   Will increase sit to stand to supervision Met   Will improve dynamic standing balance/reaching to supervision Met                 Long Term Goals    Goal Goal Status   Will increase standing tolerance to 5 mintues Progressing   Will improve dynamic standing balance/reaching to setup Progressing

## 2022-11-09 NOTE — PT/OT/SLP RE-EVAL
Occupational Therapy Inpatient Rehab Re-Evaluation    Name: Niecy Joshua  MRN: [id number]    Recommendations:     Discharge Recommendations:      Discharge Equipment Recommendations:     Barriers to discharge: None    Assessment:  Niecy Joshua is a 87 y.o. female admitted with a medical diagnosis of Closed comminuted intertrochanteric fracture of left femur with routine healing.  She presents with the following impairments/functional limitations:  weakness, impaired endurance, impaired self care skills, impaired functional mobility, gait instability, impaired balance, pain, decreased lower extremity function, decreased safety awareness, decreased ROM, orthopedic precautions.    General Precautions: Standard, fall     Orthopedic Precautions:LLE weight bearing as tolerated     Braces: N/A    Rehab Prognosis: Good; patient would benefit from acute skilled OT services to address these deficits and reach maximum level of function.      History:     Past Medical History:   Diagnosis Date    Cardiac arrhythmia     Low vitamin D level     Osteoporosis     Other pulmonary embolism without acute cor pulmonale        Past Surgical History:   Procedure Laterality Date    BREAST MASS EXCISION      x3  benign    LAPAROSCOPIC REPAIR OF INGUINAL HERNIA Bilateral        Subjective     Orientation: Oriented x4    Chief Complaint: pain    Patient/Family Comments/goals: return to PLOF    Vitals  Vitals at Rest  BP     HR     O2 Sat     Pain Pain Rating 1: 6/10  Location - Side 1: Left  Location 1: hip  Pain Addressed 1: Pre-medicate for activity, Other (see comments) (ice applied at end of session)     Vitals With Activity  BP     HR     O2 Sat     Pain Pain Rating 1: 4/10  Location - Side 1: Left  Location 1: hip  Pain Addressed 1: Reposition, Pre-medicate for activity     Respiratory Status: Room air    Patients cultural, spiritual, Quaker conflicts given the current situation:         Living Environment   Living  Environment  Lives With: alone  Living Arrangements: house  Home Accessibility: stairs to enter home  Number of Stairs, Main Entrance: five  Stair Railings, Main Entrance: railing on left side (ascending)  Shower Setup: Walk-in shower    Prior Level of Function  BADL: Independent    IADL: Supervision or Set-up Assistance        Upon discharge, patient will have assistance from family.    Objective:     Patient found up in chair with see catheter  upon OT entry to room.    Mobility   Patient completed:  Sit to Stand Transfer with supervision with rolling walker  Stand to Sit Transfer with supervision with rolling walker  Toilet Transfer Step Transfer technique with supervision with  rolling walker and BSC over toilet    Functional Mobility   Ambulated minimally t/o session using RW, declining further d/t pain    ADLs     Current Status   Eating 6   Oral Hygiene 6   Shower, Bathe Self 4   Upper Body Dressing 6   Lower Body Dressing 4   Toileting Hygiene 4 manages clothing and pericare after small BM   Toilet Transfer 4   Putting On, Taking Off Footwear 4     Limiting Factors for ADLs: motor, balance, and weakness      Exams     ROM:          -       WFL    Hand Dominance: Right        Strength  Overall Strength:          -       WFL      Coordination:      -       Intact      Visual/Perceptual  Intact    Cognition:   WFL    Balance    Standing  Static: Bilateral UE Extremity Support, Good (-)  Dynamic: Bilateral UE extremity support, Fair (+)      Additional Treatments   Performed therex of yellow/light theraband 1x15 B shoulder flex, abduct, scap adduct, bicep curl and tricep ext, 3x5 chair pushups. Edu to perform as HEP. Performed static sit EOM while OT gathered measurements for w/c fitting    LifeStyle Change and Education     Patient left up in chair with all lines intact and call button in reach.    Education provided: Roles and goals of OT, ADLs, transfer training, body mechanics, assistive device,  sequencing, fall prevention, and home safety    Multidisciplinary Problems       Occupational Therapy Goals          Problem: Occupational Therapy    Goal Priority Disciplines Outcome Interventions   Occupational Therapy Goal     OT, PT/OT Ongoing, Progressing    Description: ADLs:  Pt to perform grooming tasks with set up and standing at the sink.  Pt to perform UB dressing with set up   Pt to perform LB dressing with Partial/mod assist with AE as needed.  Pt to perform putting on/off footwear task with Max assist with AE as needed  Pt to perform toileting with partial/mod assist    Functional Transfers:  Pt to perform toilet transfers with partial/mod assist and RW  Pt to perform a tub transfer with partial/mod assist and RW/TTB  Pt to perform a walk-in shower transfer with partial/mod assist and RW    IADLs:  Pt to perform simple meal prep with partial/mod assist with RW     Balance, Strengthening, Endurance, Balance:  Pt to consistently demonstrate adherence to orthopedic precautions during all ADL's as instructed by OT.  Pt to demonstrate fair dynamic standing balance as required to perform ADL's from standing level.  Pt to demonstrate good BUE strength during functional task   Pt to demonstrate consistent adherence to breathing control and energy conservation techniques as educated by OT.                        Plan     During this hospitalization, patient to be seen 5 x/week to address the identified rehab impairments via self-care/home management, therapeutic activities, therapeutic exercises and progress toward the following goals:    Plan of Care Expires:  11/10/22    Time Tracking     OT Received On: 11/09/22  Time In 1300     Time Out 1430  Total Time 90 min  Therapy Time: OT Individual: 90  Missed Time:    Missed Time Reason:      Billable Minutes: Re-eval 30, Self Care/Home Management 30, and Therapeutic Exercise 30    11/09/2022

## 2022-11-09 NOTE — PROGRESS NOTES
11/09/22 0900   Rec Therapy Time Calculation   Date of Treatment 11/09/22   Rec Start Time 0900   Rec Stop Time 0930   Rec Total Time (min) 30 min   Time   Treatment time 2 units   Precautions   General Precautions fall   Orthopedic Precautions  LLE weight bearing as tolerated   Braces N/A   Pain/Comfort   Pain Rating 1 4/10   Location - Side 1 Left   Location - Orientation 1 lower   Location 1 hip   Pain Addressed 1 Reposition   OTHER   Rehab identified problem list/impairments weakness;impaired endurance;gait instability;impaired functional mobility;decreased lower extremity function;pain   Values/Beliefs/Spiritual Care   Spiritual, Cultural Beliefs, Baptism Practices, Values that Affect Care no   Overall Level of Functioning   Activity Tolerance Mod Indep   Dynamic Sitting Balance/Reaching Does not occur   Dynamic Standing Balance/Reaching Mod Indep   Right UE Coodination/Dexterity Independent   Left UE Coordination/Dexterity Independent   Problem Solving/Sequencing Skills Independent   Memory Recall Independent   R/L Neglect/Inattention Does not occur   Attention Span Independent   Recreational Therapy Short Term Goals   Short Term Goal 1 Progression Met   Short Term Goal 2 Progression Met   Recreational Therapy Long Term Goals   Long Term Goal 1 Progression Progressing   Long Term Goal 2 Progression Progressing   Plan   Patient to be seen Daily   Planned Duration 1 week   Treatments Planned Energy conservation training   Treatment plan/goals estblished with Patient/Caregiver Yes

## 2022-11-09 NOTE — PT/OT/SLP PROGRESS
Physical Therapy Inpatient Rehab Treatment    Patient Name:  Niecy Joshua   MRN:  [id number]    Recommendations:     Discharge Recommendations:      Discharge Equipment Recommendations: walker, rolling   Barriers to discharge: Decreased caregiver support    Assessment:     Niecy Joshua is a 87 y.o. female admitted with a medical diagnosis of Closed comminuted intertrochanteric fracture of left femur with routine healing.  She presents with the following impairments/functional limitations:  weakness, impaired endurance, impaired self care skills, impaired functional mobility, gait instability, impaired balance, decreased lower extremity function, decreased safety awareness, pain, decreased ROM, impaired coordination, orthopedic precautions, impaired muscle length .    Rehab Diagnosis: L femur fx s/p IM nail    Recent Surgery: * No surgery found *      General Precautions: Standard, fall     Orthopedic Precautions:LLE weight bearing as tolerated     Braces: N/A    Rehab Prognosis: Good; patient would benefit from acute skilled PT services to address these deficits and reach maximum level of function.      History:     Past Medical History:   Diagnosis Date    Cardiac arrhythmia     Low vitamin D level     Osteoporosis     Other pulmonary embolism without acute cor pulmonale        Past Surgical History:   Procedure Laterality Date    BREAST MASS EXCISION      x3  benign    LAPAROSCOPIC REPAIR OF INGUINAL HERNIA Bilateral        Subjective     Chief Complaint: none     Respiratory Status: Room air    Patients cultural, spiritual, Episcopal conflicts given the current situation: no      Objective:     Communicated with nursing  prior to session.  Patient found up in chair with peripheral IV, Other (comments) (in wc seen after RT)  see cath.  upon PT entry to room.    Pt is Oriented x3 and Alert and Cooperative.    Functional Mobility:   Transfers:     Sit to Stand: Supervision or touching assistance  with  rolling walker  Gait: Pt ambulates 70ft then 78ft  with RW and gt belt  with touching a and vc for increasing NBOS/step length/standing posture pt slow tara increased time required with few standing rest breaks to increase gt distance lle wbat .   Wheelchair Propulsion:  Pt propelled Standard wheelchair x 200ft then 2 trials 150 feet on Level tile with  Bilateral upper extremity with Supervision or touching assistance .      Current   Status  Discharge   Goal   Functional Area: Care Score:    Roll Left and Right   Independent   Sit to Lying   Independent   Lying to Sitting on Side of Bed   Independent   Sit to Stand 4 Independent   Chair/Bed-to-Chair Transfer   Independent   Car Transfer   Supervision or touching assistance   Walk 10 Feet 4 Independent   Walk 50 Feet with Two Turns 4 Supervision or touching assistance   Walk 150 Feet 88 Supervision or touching assistance   Walk 10 Feet Uneven Surface   Supervision or touching assistance   1 Step (Curb)   Supervision or touching assistance   4 Steps   Supervision or touching assistance   12 Steps   Partial/moderate assistance   Picking Up Object   Independent   Wheel 50 Feet with Two Turns 4 Independent   Wheel 150 Feet 4 Independent       Therapeutic Activities and Exercises:  Pt performed seated HEP given and reviewed handouts 2 sets 15reps with 2# wts BLE rest breaks required   Also educated pt on wc management of parts of wc     Activity Tolerance: Good    Patient left HOB elevated with call button in reach, bed alarm on, nursing  notified, and pt jamie tx required increased time with ambulation activities pt in bed for nursing to dc esa dominguez NP Brooks ordered NIVA lle moses tx  .    Education provided: transfer training, bed mob, gait training, safety awareness, assistive device, wheelchair management, and strengthening exercises    Expected compliance: Moderate compliance    GOALS:   Multidisciplinary Problems       Physical Therapy Goals          Problem:  Physical Therapy    Goal Priority Disciplines Outcome Goal Variances Interventions   Physical Therapy Goal     PT, PT/OT Ongoing, Progressing     Description: Bed Mobility:  Roll left and right with supervision/touching assist.  Sit to supine transfer with supervision/touching assist.  Supine to sit transfer with supervision/touching assist.    Transfers:  Sit to stand transfer with supervision/touching assist using RW.  Bed to chair transfer with supervision/touching assist using RW.  Car transfer with partial/moderate assist using RW.   an object from the ground in standing position with supervision/touching assist using RW and reacher.    Mobility:  Ambulate 50 feet with supervision/touching assist using RW.  Ambulate 15 feet on uneven surfaces/ramps with partial/moderate assist using RW.  Ascend/descend a 4 inch curb with partial/moderate assist using RW.   Ascend/descend 5 stairs with partial/moderate assist using left handrail.                         Plan:     During this hospitalization, patient to be seen daily (5-7x/week) to address the identified rehab impairments via gait training, therapeutic exercises, therapeutic activities, wheelchair management/training and progress toward the following goals:    Plan of Care Expires:  11/10/22  PT Next Visit Date: 11/10/22  Plan of Care reviewed with: patient    Additional Information:         Time Tracking:     Therapy Time  PT Received On: 11/09/22  PT Start Time: 0930  PT Stop Time: 1100  PT Total Time (min): 90 min   PT Individual: 90  Missed Time:    Time Missed due to:      Billable Minutes: Gait Training 30min, Therapeutic Activity 30min, and Therapeutic Exercise 30min    11/09/2022

## 2022-11-09 NOTE — PROGRESS NOTES
Subjective:  HPI: 87 y.o. WF with PMH of DVT, osteoporosis, and vitamin-D deficiency presented to [facility] ED on 10/31/2022 for mechanical fall.  Patient reports she was walking to the bathroom when she fell onto her left hip.  Patient denies hitting head, LOC, chest pain, shortness of breath, abdominal pain, nausea, vomiting, dizziness, syncope, and fever.  Patient reports improvement of left hip pain after pain medications given in ED. Initial vital signs in the ED were /68, pulse 70, respirations 16, temperature 36.8° C, and SpO2 97%.  Labs revealed WBC 14.8, chloride 109, BUN 22.4, and glucose 148.  Chest x-ray revealed no acute pulmonary process.  Left hip x-ray revealed comminuted left intertrochanteric femur fracture.  Orthopedics was consulted with plans for surgery. Patient was admitted to hospital medicine service for further medical management. She underwent left intertrochanteric femur fracture intramedullary nailing on 11/1 with Dr. Friend. Participating with therapy. Functional status includes bed mobility and transfers requiring . Amb w/RW for . Patient was evaluated, accepted, and admitted to inpatient rehab to improve functional status. Transferred to [facility] on / without incident.    11/10: Seen in OT, seated in WC working with adaptive equipment. Tells me that the see catheter had to be replaced 2/2 continued urinary retention. Explained to her that she will follow-up with an Urologist after discharge. Tolerating therapy without complaint. VSSAF.       Review of Systems  Depression/Anxiety: no complaints     ALPRAZolam tablet 0.25 mg TID PRN Anxiety  Pain: left hip with movement-controlled     acetaminophen tablet 650 mg TID  methocarbamoL tablet 750 mg TID  acetaminophen tablet 650 mg q4hr PRN mild pain  oxyCODONE immediate release tablet 5 mg q4hr PRN mod pain  Bowels/Bladder: last BM 11/8, 11/6 following enema, 11/5 following suppository- not feeling fully evacuated; 11/3 (loose,  incontinent-small amount) DC M[place]arty 11/9-replaced 11/10 2/2 continued retention   Appetite: decreased from baseline-improving    Sleep: good          Physical Exam  General: well-developed, well-nourished, in no acute distress  Respiratory: equal chest rise, no SOB, no audible wheeze  Cardiovascular: regular rate and rhythm, BLE edema  Gastrointestinal: soft, non-tender, non-distended   Musculoskeletal: decreased ROM/strength to LLE  Integumentary: no rashes or skin lesions present, Left hip incisions x 4-staples, dressings-drainage saturated dressing   Neurologic: cranial nerves intact, no signs of peripheral neurological deficit, motor/sensory function intact      Assessment/Plan  Hospital   Closed comminuted intertrochanteric fracture of left femur     Non-Hospital   Osteoporosis   Cardiac arrhythmia   Other pulmonary embolism without acute cor pulmonale   Chronic deep vein thrombosis (DVT) of distal vein of lower extremity   Low vitamin D level       Wounds: Left hip incisions x 4-staples, dressings-drainage saturated dressing   S/p left intertrochanteric femur fracture intramedullary nailing on 11/1 with Dr. Friend  Precautions: WBAT LLE  Bracing/AD: RW  Swallowing: Regular Diet  Function: Tolerating therapy. Continue PT/OT  VTE Prophylaxis: SCDs. Eliquis on Hold. Receiving Blood.   Code Status: FULL CODE   Discharge:Lives alone in the [place] in a single-story home with 4-5 steps and bilateral rails to enter the residence. She completed high school. No  history. She is a retired . Patient is . She lives alone and was completely independent. Pts son lives behind her. He states that they have good family support and will be able to assist her after rehab.    Children (4). Date 11/17 Thursday.

## 2022-11-09 NOTE — PROGRESS NOTES
[facility] ([facility])  Rehab Progress Note    Patient Name: Niecy Joshua  MRN: [id number]  Age: 87 y.o. Sex: female  : 1935  Hospital Length of Stay: 6 days  Date of Service: 2022   Chief Complaint: Left hip fracture s/p IMN on 2022    Subjective:     Basic Information  Admit Information: 87-year-old white female presented to [facility] ED on 10/31/2022 complaining of left hip pain after falling from ground level.  PMH significant for unprovoked DVT diagnosed in  and osteoporosis.  Work up significant for hip fracture.  Tolerated left IMN on  without perioperative complications.  Orthopedic surgery recommended WBAT to RLE.  H&H continues to trend downward with hemoglobin 7.1 requiring 1 unit PRBC transfusion.  Tolerated transfer to [facility] inpatient rehab unit on  without incident.  Today's Information: No acute events overnight.  Sitting up in chair.  Reports good sleep and appetite.  Last BM .  Vital signs at goal.  No labs or imaging today.    Review of patient's allergies indicates:  No Known Allergies     Current Facility-Administered Medications:     0.9%  NaCl infusion (for blood administration), , Intravenous, Q24H PRN, Luis Felipe A Julius, FNP    acetaminophen tablet 650 mg, 650 mg, Oral, Q4H PRN, Luis Felipe A Julius, FNP    acetaminophen tablet 650 mg, 650 mg, Oral, TID, Dilcia FUNES [place], FNP, 650 mg at 22 0541    ALPRAZolam tablet 0.25 mg, 0.25 mg, Oral, TID PRN, Luis Felipe A Julius, FNP    apixaban tablet 5 mg, 5 mg, Oral, BID, Luis Felipe A Julius, FNP, 5 mg at 22 0901    benzonatate capsule 100 mg, 100 mg, Oral, TID PRN, Luis Felipe A Julius, FNP    bisacodyL suppository 10 mg, 10 mg, Rectal, Daily PRN, Luis Felipe A Julius, FNP    docusate sodium capsule 100 mg, 100 mg, Oral, BID, Luis Felipe A Julius, FNP, 100 mg at 22 09    furosemide tablet 60 mg, 60 mg, Oral, Daily, Luis Felipe [place], LLUVIA    hydrALAZINE injection 10  mg, 10 mg, Intravenous, Q4H PRN, Luis Felipe A Julius, FNP    hydrOXYzine pamoate capsule 50 mg, 50 mg, Oral, Nightly PRN, Luis Felipe A Julius, FNP    labetalol 20 mg/4 mL (5 mg/mL) IV syring, 10 mg, Intravenous, Q4H PRN, Luis Felipe A Julius, FNP    methocarbamoL tablet 750 mg, 750 mg, Oral, QID, Luis Felipe A Julius, FNP, 750 mg at 11/09/22 0541    metoprolol injection 10 mg, 10 mg, Intravenous, Q2H PRN, Luis Felipe A Julius, FNP    nitroGLYCERIN SL tablet 0.4 mg, 0.4 mg, Sublingual, Q5 Min PRN, Luis Felipe A Julius, FNP    ondansetron disintegrating tablet 4 mg, 4 mg, Oral, Q6H PRN, Luis Felipe A Julius, FNP    oxyCODONE immediate release tablet 5 mg, 5 mg, Oral, Q4H PRN, Luis Felipe A Julius, FNP, 5 mg at 11/08/22 1424    pantoprazole EC tablet 40 mg, 40 mg, Oral, Daily, Luis Felipe A Julius, FNP, 40 mg at 11/09/22 0901    polyethylene glycol packet 17 g, 17 g, Oral, BID PRN, Luis Felipe A Julius, FNP, 17 g at 11/08/22 0829    promethazine tablet 25 mg, 25 mg, Oral, Q6H PRN, Luis Felipe A Julius, FNP     Review of Systems   Complete 12-point review of symptoms negative except for what's mentioned in HPI     Objective:     /69   Pulse 78   Temp 98 °F (36.7 °C) (Oral)   Resp 20   Wt 76.4 kg (168 lb 6.9 oz)   SpO2 98%   BMI 26.38 kg/m²        Intake/Output Summary (Last 24 hours) at 11/9/2022 1021  Last data filed at 11/9/2022 0800  Gross per 24 hour   Intake 960 ml   Output 750 ml   Net 210 ml         Physical Exam  Constitutional:       Appearance: Normal appearance.   HENT:      Head: Normocephalic.      Mouth/Throat:      Mouth: Mucous membranes are moist.   Eyes:      Pupils: Pupils are equal, round, and reactive to light.   Cardiovascular:      Rate and Rhythm: Normal rate and regular rhythm.      Heart sounds: Normal heart sounds.      Comments: Regular rate with intermittent pauses.  LLE 2+ edema  Pulmonary:      Effort: Pulmonary effort is normal.      Breath sounds: Normal breath sounds.   Abdominal:       General: Bowel sounds are normal.      Palpations: Abdomen is soft.   Musculoskeletal:      Cervical back: Neck supple.      Comments: Generalized weakness and muscle atrophy.  Left hip dressing clean and intact-moderate swelling appreciated.    Skin:     General: Skin is warm and dry.   Neurological:      General: No focal deficit present.      Mental Status: She is alert and oriented to person, place, and time.   Psychiatric:         Mood and Affect: Mood normal.         Behavior: Behavior normal.         Thought Content: Thought content normal.         Judgment: Judgment normal.   *MD performed and documented physical examination         Lines/Drains/Airways       Drain  Duration                  Urethral Catheter 11/03/22 1053 16 Fr. 6 days              Peripheral Intravenous Line  Duration                  Peripheral IV - Single Lumen 11/04/22 1700 Posterior;Right Hand 4 days                    Labs  No results found for this or any previous visit (from the past 24 hour(s)).    Radiology  Left hip XR on 11/01/2022 at 9:34 a.m., IMPRESSION:Improved alignment following internal fixation of the left femur.    Assessment/Plan:     87 y.o. WF admitted on 11/3/2022    Left hip fracture   - s/p IMN on 11/01/2022  - WBAT to RLE  - staples to remove on 11/14/2022  - continue   Lasix 60 mg daily oral (initiated 11/9)  - continue                Oxycodone 5 mg q.4 hours p.r.n.                 Robaxin 750 mg q.i.d.                 Tylenol 650 mg t.i.d.  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  - continue                Eliquis 5 mg b.i.d.(resumed 11/7)     Normocytic anemia  - asymptomatic  - s/p transfusion   x2 units PRBC on 11/4/2022                x1 units PRBC on 11/3/2022  - H/H trending up  - no evidence of active bleeds  - will closely monitor and transfuse if needed      GERD  - Avoid spicy foods, and nothing to eat  or drink within x2 hours of bedtime or laying flat (water is ok)   - Avoid NSAIDs (Advil, ibuprofen, naproxen...) and shankar-2 inhibitors (Mobic, Celebrex)    - continue                Protonix 40 mg daily      Vitamin D deficiency  - vitamin-D level 32.0 on 11/05  - continue                Vitamin-D 5000 units daily      Constipation  - last BM 11/8  - continue  Colace 100 mg BID   Miralax 17 gm BID PRN     Urinary retention   - current   - Indwelling catheter reinserted 11/2   - discontinue indwelling catheter 11/9  - bladder scan q6h to monitor for retention     VTE Prophylaxis:  Eliquis 5 mg b.i.d.-resumed 11/7    COVID-19 testing:  Negative on 11/03/2022  COVID-19 vaccination status:  Vaccinated (Moderna):  07/23/2021 and 08/20/2021, and received 2 boosters     POA: Yes  Living will: Yes  Contacts: Lukas Joshua (son) [phone]                     Marya Gaytan (daughter) [phone]     CODE STATUS: Full Code  Internal Medicine (attending): Cb Fall MD  Physiatry (consulting):  Toño Polk MD     OUTPATIENT PROVIDERS  PCP:  King Duvall MD  Orthopedic surgery:  Florentino Friend MD     DISPOSITION: Condition stable.  Sleep hygiene, bowel maintenance, and appetite at goal.  Last BM 11/8.  Vital signs at goal with no recorded fevers.  Discontinue indwelling catheter.  Bladder scan q.6 hours p.r.n..  Completed IV Lasix.  Initiate Lasix 60 mg oral daily.  Continue to monitor closely.  Notify of acute changes.    Staffing 11/7/2022: Continent of bowel.  Indwelling catheter to be discontinued on Wednesday. RT: Overall supervision.  Limited by left hip pain.  Improving.  Appetite is good OTW.  PT: Overall mod assist for bed mobility and transfers.  Ambulating 22 feet with walker.  OT: overall max to mod assist with LE dressing.  Limited by pain, balance, and endurance.  Needs more time. Projected discharge 11/17.     Brooks Madrid NP conducted independent physical examination and assisted with medical  documentation.    Total time spent on this encounter including chart review and direct MD + NP 1-on-1 patient interaction: 44 minutes   Over 50% of this time was spent in counseling and coordination of care

## 2022-11-10 PROCEDURE — 25000003 PHARM REV CODE 250: Performed by: NURSE PRACTITIONER

## 2022-11-10 PROCEDURE — 99900035 HC TECH TIME PER 15 MIN (STAT)

## 2022-11-10 PROCEDURE — 99233 SBSQ HOSP IP/OBS HIGH 50: CPT | Mod: ,,, | Performed by: NURSE PRACTITIONER

## 2022-11-10 PROCEDURE — 97110 THERAPEUTIC EXERCISES: CPT

## 2022-11-10 PROCEDURE — 51798 US URINE CAPACITY MEASURE: CPT

## 2022-11-10 PROCEDURE — 99233 PR SUBSEQUENT HOSPITAL CARE,LEVL III: ICD-10-PCS | Mod: ,,, | Performed by: NURSE PRACTITIONER

## 2022-11-10 PROCEDURE — 11800000 HC REHAB PRIVATE ROOM

## 2022-11-10 PROCEDURE — 51701 INSERT BLADDER CATHETER: CPT

## 2022-11-10 PROCEDURE — 97535 SELF CARE MNGMENT TRAINING: CPT

## 2022-11-10 PROCEDURE — 51702 INSERT TEMP BLADDER CATH: CPT

## 2022-11-10 PROCEDURE — 97164 PT RE-EVAL EST PLAN CARE: CPT

## 2022-11-10 PROCEDURE — 97530 THERAPEUTIC ACTIVITIES: CPT

## 2022-11-10 PROCEDURE — 94799 UNLISTED PULMONARY SVC/PX: CPT

## 2022-11-10 RX ORDER — LACTULOSE 10 G/15ML
30 SOLUTION ORAL ONCE
Status: COMPLETED | OUTPATIENT
Start: 2022-11-10 | End: 2022-11-10

## 2022-11-10 RX ADMIN — METHOCARBAMOL 750 MG: 750 TABLET ORAL at 07:11

## 2022-11-10 RX ADMIN — METHOCARBAMOL 750 MG: 750 TABLET ORAL at 12:11

## 2022-11-10 RX ADMIN — PANTOPRAZOLE SODIUM 40 MG: 40 TABLET, DELAYED RELEASE ORAL at 10:11

## 2022-11-10 RX ADMIN — ACETAMINOPHEN 650 MG: 325 TABLET, FILM COATED ORAL at 05:11

## 2022-11-10 RX ADMIN — LACTULOSE 30 G: 20 SOLUTION ORAL at 03:11

## 2022-11-10 RX ADMIN — ACETAMINOPHEN 650 MG: 325 TABLET, FILM COATED ORAL at 03:11

## 2022-11-10 RX ADMIN — NALOXEGOL OXALATE 25 MG: 25 TABLET, FILM COATED ORAL at 03:11

## 2022-11-10 RX ADMIN — APIXABAN 5 MG: 5 TABLET, FILM COATED ORAL at 07:11

## 2022-11-10 RX ADMIN — FUROSEMIDE 60 MG: 20 TABLET ORAL at 10:11

## 2022-11-10 RX ADMIN — APIXABAN 5 MG: 5 TABLET, FILM COATED ORAL at 10:11

## 2022-11-10 RX ADMIN — OXYCODONE HYDROCHLORIDE 5 MG: 5 TABLET ORAL at 10:11

## 2022-11-10 RX ADMIN — METHOCARBAMOL 750 MG: 750 TABLET ORAL at 05:11

## 2022-11-10 RX ADMIN — DOCUSATE SODIUM 100 MG: 100 CAPSULE, LIQUID FILLED ORAL at 10:11

## 2022-11-10 RX ADMIN — ACETAMINOPHEN 650 MG: 325 TABLET, FILM COATED ORAL at 10:11

## 2022-11-10 NOTE — PROGRESS NOTES
11/10/22 0900   Rec Therapy Time Calculation   Date of Treatment 11/10/22   Rec Start Time 0900   Rec Stop Time 0930   Rec Total Time (min) 30 min   Time   Treatment time 2 units   Precautions   General Precautions fall   Orthopedic Precautions  LLE weight bearing as tolerated   Braces N/A   Pain/Comfort   Pain Rating 1 no pain   OTHER   Rehab identified problem list/impairments weakness;impaired endurance;impaired functional mobility;gait instability;decreased lower extremity function   Values/Beliefs/Spiritual Care   Spiritual, Cultural Beliefs, Mandaen Practices, Values that Affect Care no   Overall Level of Functioning   Activity Tolerance Mod Indep   Dynamic Sitting Balance/Reaching Does not occur   Dynamic Standing Balance/Reaching Mod Indep   Right UE Coodination/Dexterity Independent   Left UE Coordination/Dexterity Independent   Problem Solving/Sequencing Skills Independent   Memory Recall Independent   R/L Neglect/Inattention Does not occur   Attention Span Independent   Recreational Therapy Short Term Goals   Short Term Goal 1 Progression Met   Short Term Goal 2 Progression Met   Recreational Therapy Long Term Goals   Long Term Goal 1 Progression Progressing   Long Term Goal 2 Progression Met   Plan   Patient to be seen Daily   Planned Duration 1 week   Treatments Planned Energy conservation training   Treatment plan/goals estblished with Patient/Caregiver Yes

## 2022-11-10 NOTE — PT/OT/SLP RE-EVAL
Recreational Therapy Re-Evaluation      Date of Treatment: 11/10/22  Start Time: 0900  Stop Time: 0930  Total Time: 30 min  Missed Time:    Assessment      Niecy Joshua is a 87 y.o. female admitted with a medical diagnosis of Closed comminuted intertrochanteric fracture of left femur with routine healing.  She presents with the following impairments/functional limitations:  weakness, impaired endurance, impaired functional mobility, gait instability, decreased lower extremity function .    Rehab Diagnosis:     Recent Surgery: * No surgery found *      General Precautions: Standard, fall     Orthopedic Precautions:LLE weight bearing as tolerated     Braces: N/A    Rehab Prognosis: Good; patient would benefit from acute skilled Recreational Therapy services to address these deficits and reach maximum level of function.      Impairments: Endurance deficits, Mobility deficits, and Strength deficits  Rehab Potential: Good  Treatment Recommendations: Continue with current plan of care   Treatment Diagnosis: Fall, L intertrochanteric femur fx, IMN, DVT, osteoporosis, vitamin D deficiency,  Orientation: Oriented x4  Affect/Behavior: Appropriate and Cooperative  Safety/Judgement: intact   Basic Command Following: intact  Spiritual Cultural: no        History     Past Medical History:   Diagnosis Date    Cardiac arrhythmia     Low vitamin D level     Osteoporosis     Other pulmonary embolism without acute cor pulmonale        Past Surgical History:   Procedure Laterality Date    BREAST MASS EXCISION      x3  benign    LAPAROSCOPIC REPAIR OF INGUINAL HERNIA Bilateral        Home Environment     Admit Date: 11/03/22  Living Situation  Lives With: alone  Lives in: house  Patients Responsibilities: , Financial management, Health and wellness, Laundry, Leisure/play/hobbies, Meal preparation, Retired, Shopping, Yard Work  Number of Children: 4  Occupation:Retired:     Instrumental Activities of Daily  "Living     Previous Hand Dominance: Right Current Hand Dominance: Right     Other iADL Information:          Cognitive Skills Building                          Comment:      Dynamic Activities      Activity Assist Position Equipment Response   Activity 1 Horseshoes modified independence Standing Rolling walker and Rubber horseshoes good   Comment: Sit to stand was setup as was dynamic standing balance/reaching.  Standing tolerance was 3 minutes. UE coordination and sequencing skills were I. Playful with staff       Fine Motor Activities      Activity Assist Position Equipment Response           Comment:        Goals     Patient Goals  Patient Goal 1: "To be able to walk."    Short Term Goals    Goal  Goal Status   Will increase sit to stand to supervision Met   Will improve dynamic standing balance/reaching to supervision Met                 Long Term Goals    Goal Goal Status   Will increase standing tolerance to 5 mintues Progressing   Will improve dynamic standing balance/reaching to setup Met                     Plan       Patient to be seen: Daily  Duration: 1 week  Treatments planned: Energy conservation training  Treatment plan/goals established with Patient/Caregiver: Yes     "

## 2022-11-10 NOTE — PT/OT/SLP EVAL
Physical Therapy Rehab Re-Evaluation    Patient Name:  Niecy Joshua   MRN:  [id number]    Recommendations:     Discharge Recommendations:      Discharge Equipment Recommendations: walker, rolling   Barriers to discharge: Decreased caregiver support    Assessment:     Niecy Joshua is a 87 y.o. female admitted with a medical diagnosis of Closed comminuted intertrochanteric fracture of left femur with routine healing.  She presents with the following impairments/functional limitations:  weakness, impaired endurance, impaired functional mobility, gait instability, impaired balance, decreased lower extremity function, decreased safety awareness, pain, decreased ROM, edema.    Rehab Diagnosis: L femur fx s/p IM nail    Recent Surgery: * No surgery found *      General Precautions: Standard, fall     Orthopedic Precautions: LLE weight bearing as tolerated     Braces: N/A    Rehab Prognosis: Good; patient would benefit from acute skilled PT services to address these deficits and reach maximum level of function.      History:     Past Medical History:   Diagnosis Date    Cardiac arrhythmia     Low vitamin D level     Osteoporosis     Other pulmonary embolism without acute cor pulmonale        Past Surgical History:   Procedure Laterality Date    BREAST MASS EXCISION      x3  benign    LAPAROSCOPIC REPAIR OF INGUINAL HERNIA Bilateral        Subjective     Chief Complaint: Aching pain in anterior L hip  Patient/Family Comments/goals: N/A    Patients cultural, spiritual, Hindu conflicts given the current situation:         Living Environment  Lives With: alone  Living Arrangements: house  Home Accessibility: stairs to enter home  Number of Stairs, Main Entrance: five  Stair Railings, Main Entrance: railings on both sides of stairs    Prior Level of Function  Ambulation Skills: independent  Stairs: independent  Transfer Skills: independent  ADL Skills: independent  Work/Leisure Activity: independent  Cognitive  Communication: independent    Equipment used at home:  .  DME owned (not currently used): none.      Upon discharge, patient will have assistance from no one.    Objective:     Communicated with RN prior to session.  Patient found up in chair with peripheral IV, see catheter  upon PT entry to room.    Vitals   Vitals at Rest  BP     HR     O2 Sat     Pain Pain Rating 1: 4/10  Location - Side 1: Left  Location - Orientation 1: anterior  Location 1: hip  Pain Addressed 1: Reposition     Vitals With Activity  BP     HR     O2 Sat     Pain Pain Rating 1: 4/10  Location - Side 1: Left  Location 1: hip  Pain Addressed 1: Reposition     Respiratory Status: Room air    Exams    Tests and Measures:      PROM  AROM    Right  LE    WFL    Left    LE    WFL       Lower Extremity Strength:    Right LE  Left LE    Knee extension: 4+ Knee extension: 3   Knee flexion: 4+ Knee flexion: 3   Hip flexion: 4 Hip flexion: 3   Hip extension:  Not Tested Hip extension: Not Tested   Hip abduction: 4+ Hip abduction: 3   Hip adduction: 4+ Hip adduction: 3   Ankle dorsiflexion: 4+ Ankle dorsiflexion: 4+   Ankle plantarflexion: 4+ Ankle plantarflexion: 4+         Functional Mobility  PT drained 800 cc from see catheter at the beginning of the session.    GGs   Admit Current   Status  Goal   Functional Area: Care Score: Care Score:  Care Score:   Roll Left and Right 3 4 HOB flat; overall CGA to trunk Independent   Sit to Lying 3 4 HOB flat; overall CGA to trunk; use of leg ; VC for sequencing Independent   Lying to Sitting on Side of Bed 3 4 HOB flat; overall CGA to trunk; use of leg ; VC for sequencing Independent   Sit to Stand 3 4 With RW; overall CGA to trunk Independent   Chair/Bed-to-Chair Transfer 3 4 W/c > bed using stand step t/f with RW; overall CGA to trunk Independent   Car Transfer 88   Supervision or touching assistance   Walk 10 Feet 3 4  Independent   Walk 50 Feet with Two Turns 88 4 ~50 ft with RW using step to  gait pattern; overall CGA to trunk Supervision or touching assistance   Walk 150 Feet 88 88  Supervision or touching assistance   Walk 10 Feet Uneven Surface 88 4 ~15 ft on Mat with RW; overall CGA to trunk; VC for sequencing Supervision or touching assistance   1 Step (Curb) 88 88  Supervision or touching assistance   4 Steps 88 88  Supervision or touching assistance   12 Steps 88 88  Partial/moderate assistance   Picking Up Object 88 4 Reacher and RW; overall CGA to trunk Independent   Wheel 50 Feet with Two Turns 4 4  Independent   Wheel 150 Feet 3 4 ~150 ft with standard w/c using BUE propulsion; overall supervision Independent     Therapeutic Activities and Exercises:  Curb negotiation:  2 in curb in // bars using RW; x 2 trials; VC for sequencing; overall CGA to trunk    Activity Tolerance: Excellent    Patient left supine with all lines intact and call button in reach.    Education Provided: roles and goals of PT/PTA, transfer training, bed mob, gait training, stair training, balance training, safety awareness, body mechanics, assistive device, and fall prevention    Expected compliance: High compliance    GOALS:   Multidisciplinary Problems       Physical Therapy Goals          Problem: Physical Therapy    Goal Priority Disciplines Outcome Goal Variances Interventions   Physical Therapy Goal     PT, PT/OT Ongoing, Progressing     Description: Bed Mobility:  Roll left and right with supervision/touching assist. (MET)  Sit to supine transfer with supervision/touching assist. (MET)  Supine to sit transfer with supervision/touching assist. (MET)  Roll left and right independently  Sit to supine transfer with setup/clean-up assist  Supine to sit transfer with setup/clean-up assist      Transfers:  Sit to stand transfer with supervision/touching assist using RW. (MET)  Bed to chair transfer with supervision/touching assist using RW. (MET)  Car transfer with partial/moderate assist using RW. (MET)   an  object from the ground in standing position with supervision/touching assist using RW and reacher. (MET)  Sit to stand transfer independently using RW  Bed to chair transfer with setup/clean-up assist using RW  Car transfer with setup/clean-up assist using RW   an object from the ground in standing position independently using RW        Mobility:  Ambulate 50 feet with supervision/touching assist using RW. (MET)  Ambulate 15 feet on uneven surfaces/ramps with partial/moderate assist using RW. (MET)  Ascend/descend a 4 inch curb with partial/moderate assist using RW.   Ascend/descend 5 stairs with partial/moderate assist using left handrail.  Ambulate 15 feet on uneven surfaces/ramps with setup/clean-up assist using RW  Ambulate 100 feet with supervision/touching assist using RW                           Plan:     During this hospitalization, patient to be seen daily (5-7x/week) to address the identified rehab impairments via gait training, therapeutic exercises, therapeutic activities, wheelchair management/training and progress toward the following goals:    Plan of Care Expires:  11/16/22  PT Next Visit Date: 11/16/22  Plan of Care reviewed with: patient      Additional Infomation:     Spoke with CM. PT requesting at home bed height, car height for D/C, and height of steps at home. Pt states she has left sided rail only, however, Chart review states she has both.      Time Tracking:     Therapy Time   PT Received On: 11/10/22  PT Start Time: 1430  PT Stop Time: 1530  PT Total Time (min): 60 min  PT Individual: 60  Missed Time:    Time Missed due to:      Billable Minutes: Re-eval 20 and Therapeutic Activity 40    11/10/2022

## 2022-11-10 NOTE — PT/OT/SLP PROGRESS
Physical Therapy Inpatient Rehab Treatment    Patient Name:  Niecy Joshua   MRN:  [id number]    Recommendations:     Discharge Recommendations:      Discharge Equipment Recommendations: walker, rolling   Barriers to discharge: None    Assessment:     Niecy Joshua is a 87 y.o. female admitted with a medical diagnosis of Closed comminuted intertrochanteric fracture of left femur with routine healing.  She presents with the following impairments/functional limitations:  weakness, impaired endurance, impaired sensation, impaired functional mobility, gait instability, impaired balance, decreased lower extremity function, decreased safety awareness, pain, decreased ROM, edema, orthopedic precautions .    Rehab Diagnosis: L femur fx s/p IM nail    Recent Surgery: * No surgery found *      General Precautions: Standard, fall     Orthopedic Precautions:LLE weight bearing as tolerated     Braces: N/A    Rehab Prognosis: Good; patient would benefit from acute skilled PT services to address these deficits and reach maximum level of function.      History:     Past Medical History:   Diagnosis Date    Cardiac arrhythmia     Low vitamin D level     Osteoporosis     Other pulmonary embolism without acute cor pulmonale        Past Surgical History:   Procedure Laterality Date    BREAST MASS EXCISION      x3  benign    LAPAROSCOPIC REPAIR OF INGUINAL HERNIA Bilateral        Subjective     Chief Complaint: L hip pain     Respiratory Status: Room air    Patients cultural, spiritual, Taoist conflicts given the current situation: no      Objective:     Communicated with RN prior to session.  Patient found up in chair with    upon PT entry to room.    Pt is Oriented x3 and Alert, Cooperative, and Motivated.       Current   Status   Discharge   Goal   Functional Area: Care Score:     Roll Left and Right    Independent   Sit to Lying    Independent   Lying to Sitting on Side of Bed    Independent   Sit to Stand 4 With RW   Independent   Chair/Bed-to-Chair Transfer 4  Independent   Car Transfer 4 Performed with leg . Pt requires increased time to complete  Supervision or touching assistance   Walk 10 Feet 4  Independent   Walk 50 Feet with Two Turns    Supervision or touching assistance   Walk 150 Feet    Supervision or touching assistance   Walk 10 Feet Uneven Surface    Supervision or touching assistance   1 Step (Curb)    Supervision or touching assistance   4 Steps    Supervision or touching assistance   12 Steps    Partial/moderate assistance   Picking Up Object    Independent   Wheel 50 Feet with Two Turns    Independent   Wheel 150 Feet    Independent       Therapeutic Activities and Exercises:  Seated therx: RLE 2# LLE 0#; increased time and rest breaks throughout.     Standing side steps in // bars x 3 trials. Overall CGA for increased step length. Working on increased weight bearing and stability on LLE.     Activity Tolerance: Good    Patient left up in chair with all lines intact and call button in reach.    Education provided: roles and goals of PT/PTA, transfer training, bed mob, gait training, balance training, safety awareness, body mechanics, assistive device, and oriented to student    Expected compliance: High compliance    GOALS:   Multidisciplinary Problems       Physical Therapy Goals          Problem: Physical Therapy    Goal Priority Disciplines Outcome Goal Variances Interventions   Physical Therapy Goal     PT, PT/OT Ongoing, Progressing     Description: Bed Mobility:  Roll left and right with supervision/touching assist.  Sit to supine transfer with supervision/touching assist.  Supine to sit transfer with supervision/touching assist.    Transfers:  Sit to stand transfer with supervision/touching assist using RW.  Bed to chair transfer with supervision/touching assist using RW.  Car transfer with partial/moderate assist using RW.   an object from the ground in standing position with  supervision/touching assist using RW and reacher.    Mobility:  Ambulate 50 feet with supervision/touching assist using RW.  Ambulate 15 feet on uneven surfaces/ramps with partial/moderate assist using RW.  Ascend/descend a 4 inch curb with partial/moderate assist using RW.   Ascend/descend 5 stairs with partial/moderate assist using left handrail.                         Plan:     During this hospitalization, patient to be seen daily (5-7x/week) to address the identified rehab impairments via gait training, therapeutic exercises, therapeutic activities, wheelchair management/training and progress toward the following goals:    Plan of Care Expires:  11/10/22  PT Next Visit Date: 11/10/22  Plan of Care reviewed with: patient    Additional Information:         Time Tracking:     Therapy Time  PT Received On: 11/10/22  PT Start Time: 1100  PT Stop Time: 1200  PT Total Time (min): 60 min   PT Individual: 60  Missed Time:    Time Missed due to:      Billable Minutes: Therapeutic Activity 30 and Therapeutic Exercise 30    11/10/2022

## 2022-11-10 NOTE — PT/OT/SLP PROGRESS
Occupational Therapy Inpatient Rehab Treatment    Name: Niecy Joshua  MRN: [id number]    Assessment:  Niecy Joshua is a 87 y.o. female admitted with a medical diagnosis of Closed comminuted intertrochanteric fracture of left femur with routine healing.  She presents with the following impairments/functional limitations:  weakness, impaired endurance, impaired self care skills, impaired functional mobility, gait instability, impaired balance, decreased coordination, decreased upper extremity function, decreased lower extremity function, decreased safety awareness, pain.    General Precautions: Standard, fall     Orthopedic Precautions:LLE weight bearing as tolerated     Braces: N/A    Rehab Prognosis: Good; patient would benefit from acute skilled OT services to address these deficits and reach maximum level of function.      History:     Past Medical History:   Diagnosis Date    Cardiac arrhythmia     Low vitamin D level     Osteoporosis     Other pulmonary embolism without acute cor pulmonale        Past Surgical History:   Procedure Laterality Date    BREAST MASS EXCISION      x3  benign    LAPAROSCOPIC REPAIR OF INGUINAL HERNIA Bilateral        Subjective     Orientation: Oriented x4    Chief Complaint: pain    Patient/Family Comments/goals: return to PLOF    Respiratory Status: Room air    Patients cultural, spiritual, Baptist conflicts given the current situation: no       Objective:     Patient found up in chair with peripheral IV  upon OT entry to room.    Mobility   Patient completed:  Sit to Stand Transfer with supervision with rolling walker  Stand to Sit Transfer with supervision with rolling walker    Functional Mobility  In room mobility in OT gym with RW with SPV while simulating changing loads from washer to dryer.    ADLs   Current Status   Oral Hygiene 6   Putting On, Taking Off Footwear 4     Limiting Factors for ADLs: motor, endurance, limited ROM, balance, weakness, and safety awareness      IADLs: Educated on use of reacher and walker bag and other accessories for walker to adhere to walker safety. Performed simulation of washing clothes. Pt utilized reacher to retrieve items out of washer and dryer. Pt tolerated standing without rest break for ~5-6 mins during task.     Therapeutic Exercise  Pt performed UB strengthening with UBE for 5 mins forward and 5 mins backward to improve ADL performance.    Patient left up in chair with all lines intact and call button in reach.     Education provided: Roles and goals of OT, ADLs, transfer training, sequencing, safety precautions, fall prevention, equipment recommendations, and home safety    Multidisciplinary Problems       Occupational Therapy Goals          Problem: Occupational Therapy    Goal Priority Disciplines Outcome Interventions   Occupational Therapy Goal     OT, PT/OT Ongoing, Progressing    Description: ADLs:  Pt to perform grooming tasks with set up and standing at the sink.  Pt to perform UB dressing with set up   Pt to perform LB dressing with Partial/mod assist with AE as needed.  Pt to perform putting on/off footwear task with Max assist with AE as needed  Pt to perform toileting with partial/mod assist    Functional Transfers:  Pt to perform toilet transfers with partial/mod assist and RW  Pt to perform a tub transfer with partial/mod assist and RW/TTB  Pt to perform a walk-in shower transfer with partial/mod assist and RW    IADLs:  Pt to perform simple meal prep with partial/mod assist with RW     Balance, Strengthening, Endurance, Balance:  Pt to consistently demonstrate adherence to orthopedic precautions during all ADL's as instructed by OT.  Pt to demonstrate fair dynamic standing balance as required to perform ADL's from standing level.  Pt to demonstrate good BUE strength during functional task   Pt to demonstrate consistent adherence to breathing control and energy conservation techniques as educated by OT.                         Time Tracking     OT Received On: 11/10/22  Time In 0800     Time Out 0900  Total Time 60 min  Therapy Time: OT Individual: 60  Missed Time:    Missed Time Reason:      Billable Minutes: Self Care/Home Management 45 and Therapeutic Exercise 15    11/10/2022

## 2022-11-10 NOTE — PROGRESS NOTES
[facility] ([facility])  Rehab Progress Note    Patient Name: Niecy Joshua  MRN: [id number]  Age: 87 y.o. Sex: female  : 1935  Hospital Length of Stay: 7 days  Date of Service: 11/10/2022   Chief Complaint: Left hip fracture s/p IMN on 2022    Subjective:     Basic Information  Admit Information: 87-year-old white female presented to [facility] ED on 10/31/2022 complaining of left hip pain after falling from ground level.  PMH significant for unprovoked DVT diagnosed in  and osteoporosis.  Work up significant for hip fracture.  Tolerated left IMN on  without perioperative complications.  Orthopedic surgery recommended WBAT to RLE.  H&H continues to trend downward with hemoglobin 7.1 requiring 1 unit PRBC transfusion.  Tolerated transfer to [facility] inpatient rehab unit on  without incident.  Today's Information: No acute events overnight.  Sitting up in chair.  Reports good sleep and appetite.  Last BM .  Complains that bowel movements are small.  Reports she hasn't had a good one in a few days.  Did not tolerate discontinuation of M[place]arty and required in and out cath x 2.  Indwelling catheter reinserted early this morning. Vital signs at goal.  No labs or imaging today.    Review of patient's allergies indicates:  No Known Allergies     Current Facility-Administered Medications:     0.9%  NaCl infusion (for blood administration), , Intravenous, Q24H PRN, Luis Felipe Starkehart, FNP    acetaminophen tablet 650 mg, 650 mg, Oral, Q4H PRN, Luis Felipe FUNES Julius, FNP    acetaminophen tablet 650 mg, 650 mg, Oral, TID, Dilcia Chancete, FNP, 650 mg at 22    ALPRAZolam tablet 0.25 mg, 0.25 mg, Oral, TID PRN, Luis Felipe A Julius, FNP    apixaban tablet 5 mg, 5 mg, Oral, BID, Luis Felipe A Julius, FNP, 5 mg at 22    benzonatate capsule 100 mg, 100 mg, Oral, TID PRN, Luis Felipe Madrid, FNP    bisacodyL suppository 10 mg, 10 mg, Rectal, Daily PRN, Luis Felipe FUNES  Julius, FNP    docusate sodium capsule 100 mg, 100 mg, Oral, BID, Luis Felipe A Julius, FNP, 100 mg at 11/09/22 2000    furosemide tablet 60 mg, 60 mg, Oral, Daily, Luis Felipe A Julius, FNP, 60 mg at 11/09/22 1252    hydrALAZINE injection 10 mg, 10 mg, Intravenous, Q4H PRN, Luis Felipe A Julius, FNP    hydrOXYzine pamoate capsule 50 mg, 50 mg, Oral, Nightly PRN, Luis Felipe A Julius, FNP    labetalol 20 mg/4 mL (5 mg/mL) IV syring, 10 mg, Intravenous, Q4H PRN, Luis Felipe A Julius, FNP    methocarbamoL tablet 750 mg, 750 mg, Oral, QID, Luis Felipe A Julius, FNP, 750 mg at 11/09/22 2218    metoprolol injection 10 mg, 10 mg, Intravenous, Q2H PRN, Luis Felipe A Julius, FNP    nitroGLYCERIN SL tablet 0.4 mg, 0.4 mg, Sublingual, Q5 Min PRN, Luis Felipe A Julius, FNP    ondansetron disintegrating tablet 4 mg, 4 mg, Oral, Q6H PRN, Luis Felipe A Julius, FNP    oxyCODONE immediate release tablet 5 mg, 5 mg, Oral, Q4H PRN, Luis Felipe A Julius, FNP, 5 mg at 11/09/22 2002    pantoprazole EC tablet 40 mg, 40 mg, Oral, Daily, Luis Felipe A Julius, FNP, 40 mg at 11/09/22 0901    polyethylene glycol packet 17 g, 17 g, Oral, BID PRN, Luis Felipe A Julius, FNP, 17 g at 11/08/22 0829    promethazine tablet 25 mg, 25 mg, Oral, Q6H PRN, Luis Felipe A Julius, FNP     Review of Systems   Complete 12-point review of symptoms negative except for what's mentioned in HPI     Objective:     /71   Pulse 92   Temp 97.8 °F (36.6 °C) (Oral)   Resp 18   Wt 76.4 kg (168 lb 6.9 oz)   SpO2 98%   BMI 26.38 kg/m²        Intake/Output Summary (Last 24 hours) at 11/10/2022 0525  Last data filed at 11/10/2022 0055  Gross per 24 hour   Intake 480 ml   Output 900 ml   Net -420 ml         Physical Exam  Constitutional:       Appearance: Normal appearance.   HENT:      Head: Normocephalic.      Mouth/Throat:      Mouth: Mucous membranes are moist.   Eyes:      Pupils: Pupils are equal, round, and reactive to light.   Cardiovascular:      Rate and Rhythm:  Normal rate and regular rhythm.      Heart sounds: Normal heart sounds.      Comments: Regular rate with intermittent pauses.  LLE 2+ edema  Pulmonary:      Effort: Pulmonary effort is normal.      Breath sounds: Normal breath sounds.   Abdominal:      General: Bowel sounds are normal.      Palpations: Abdomen is soft.   Genitourinary:     Comments: Indwelling catheter  Musculoskeletal:      Cervical back: Neck supple.      Comments: Generalized weakness and muscle atrophy.  Left hip dressing clean and intact-moderate swelling appreciated.    Skin:     General: Skin is warm and dry.   Neurological:      General: No focal deficit present.      Mental Status: She is alert and oriented to person, place, and time.   Psychiatric:         Mood and Affect: Mood normal.         Behavior: Behavior normal.         Thought Content: Thought content normal.         Judgment: Judgment normal.   *MD performed and documented physical examination         Lines/Drains/Airways       None                   Labs  No results found for this or any previous visit (from the past 24 hour(s)).    Radiology  Left hip XR on 11/01/2022 at 9:34 a.m., IMPRESSION:Improved alignment following internal fixation of the left femur.    Assessment/Plan:     87 y.o. WF admitted on 11/3/2022    Left hip fracture   - s/p IMN on 11/01/2022  - WBAT to RLE  - staples to remove on 11/14/2022  - continue    Lasix 60 mg daily oral (initiated 11/9)                Oxycodone 5 mg q.4 hours p.r.n.                 Robaxin 750 mg q.i.d.                 Tylenol 650 mg t.i.d.  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 2020  - continue                Eliquis 5 mg b.i.d.(resumed 11/7)     Normocytic anemia  - asymptomatic  - s/p transfusion   x2 units PRBC on 11/4/2022                x1 units PRBC on 11/3/2022  - H/H trending up  - no evidence of active bleeds  - will closely monitor  and transfuse if needed      GERD  - Avoid spicy foods, and nothing to eat or drink within x2 hours of bedtime or laying flat (water is ok)   - Avoid NSAIDs (Advil, ibuprofen, naproxen...) and shankar-2 inhibitors (Mobic, Celebrex)    - continue                Protonix 40 mg daily      Vitamin D deficiency  - vitamin-D level 32.0 on 11/05  - continue                Vitamin-D 5000 units daily      Constipation  - last BM 11/8  - refusing suppository  - initiate   Lactulose 30 mg x 1 now  - continue  Colace 100 mg BID   Miralax 17 gm BID PRN     Urinary retention   - current   - Indwelling catheter reinserted 11/2   - discontinue indwelling catheter 11/9  - reinserted on 11/10     VTE Prophylaxis:  Eliquis 5 mg b.i.d.-resumed 11/7    COVID-19 testing:  Negative on 11/03/2022  COVID-19 vaccination status:  Vaccinated (Moderna):  07/23/2021 and 08/20/2021, and received 2 boosters     POA: Yes  Living will: Yes  Contacts: Lukas Joshua (son) [phone]                     Marya Gaytan (daughter) [phone]     CODE STATUS: Full Code  Internal Medicine (attending): Cb Fall MD  Physiatry (consulting):  Toño Polk MD     OUTPATIENT PROVIDERS  PCP:  King Duvall MD  Orthopedic surgery:  Florentino Friend MD     DISPOSITION: Condition stable.  Sleep hygiene, bowel maintenance, and appetite at goal.  Last BM 11/8.  Vital signs at goal with no recorded fevers.  Discontinue indwelling catheter.  Initiate Lactulose 30 g x 1.  Repeat labs in am.  Continue to monitor closely.  Notify of acute changes.    Staffing 11/7/2022: Continent of bowel.  Indwelling catheter to be discontinued on Wednesday. RT: Overall supervision.  Limited by left hip pain.  Improving.  Appetite is good OTW.  PT: Overall mod assist for bed mobility and transfers.  Ambulating 22 feet with walker.  OT: overall max to mod assist with LE dressing.  Limited by pain, balance, and endurance.  Needs more time. Projected discharge 11/17.     Brooks  DAR Madrid conducted independent physical examination and assisted with medical documentation.    Total time spent on this encounter including chart review and direct MD + NP 1-on-1 patient interaction: 42 minutes   Over 50% of this time was spent in counseling and coordination of care

## 2022-11-11 LAB
ANION GAP SERPL CALC-SCNC: 6 MEQ/L
BUN SERPL-MCNC: 28.7 MG/DL (ref 9.8–20.1)
CALCIUM SERPL-MCNC: 8.4 MG/DL (ref 8.4–10.2)
CHLORIDE SERPL-SCNC: 107 MMOL/L (ref 98–107)
CO2 SERPL-SCNC: 26 MMOL/L (ref 23–31)
CREAT SERPL-MCNC: 0.71 MG/DL (ref 0.55–1.02)
CREAT/UREA NIT SERPL: 40
GFR SERPLBLD CREATININE-BSD FMLA CKD-EPI: >60 MLS/MIN/1.73/M2
GLUCOSE SERPL-MCNC: 116 MG/DL (ref 82–115)
HCT VFR BLD AUTO: 31.6 % (ref 37–47)
HGB BLD-MCNC: 10.2 GM/DL (ref 12–16)
MAGNESIUM SERPL-MCNC: 2.1 MG/DL (ref 1.6–2.6)
POTASSIUM SERPL-SCNC: 4 MMOL/L (ref 3.5–5.1)
SODIUM SERPL-SCNC: 139 MMOL/L (ref 136–145)

## 2022-11-11 PROCEDURE — 36415 COLL VENOUS BLD VENIPUNCTURE: CPT | Performed by: NURSE PRACTITIONER

## 2022-11-11 PROCEDURE — 94799 UNLISTED PULMONARY SVC/PX: CPT

## 2022-11-11 PROCEDURE — 25000003 PHARM REV CODE 250: Performed by: NURSE PRACTITIONER

## 2022-11-11 PROCEDURE — 80048 BASIC METABOLIC PNL TOTAL CA: CPT | Performed by: NURSE PRACTITIONER

## 2022-11-11 PROCEDURE — 83735 ASSAY OF MAGNESIUM: CPT | Performed by: NURSE PRACTITIONER

## 2022-11-11 PROCEDURE — 11800000 HC REHAB PRIVATE ROOM

## 2022-11-11 PROCEDURE — 97535 SELF CARE MNGMENT TRAINING: CPT

## 2022-11-11 PROCEDURE — 97116 GAIT TRAINING THERAPY: CPT | Mod: CQ

## 2022-11-11 PROCEDURE — 97530 THERAPEUTIC ACTIVITIES: CPT | Mod: CQ

## 2022-11-11 PROCEDURE — 85014 HEMATOCRIT: CPT | Performed by: NURSE PRACTITIONER

## 2022-11-11 PROCEDURE — 97110 THERAPEUTIC EXERCISES: CPT

## 2022-11-11 RX ORDER — POTASSIUM CHLORIDE 20 MEQ/1
20 TABLET, EXTENDED RELEASE ORAL DAILY
Status: DISCONTINUED | OUTPATIENT
Start: 2022-11-11 | End: 2022-11-15

## 2022-11-11 RX ADMIN — APIXABAN 5 MG: 5 TABLET, FILM COATED ORAL at 07:11

## 2022-11-11 RX ADMIN — NALOXEGOL OXALATE 25 MG: 25 TABLET, FILM COATED ORAL at 08:11

## 2022-11-11 RX ADMIN — POTASSIUM CHLORIDE 20 MEQ: 20 TABLET, EXTENDED RELEASE ORAL at 12:11

## 2022-11-11 RX ADMIN — METHOCARBAMOL 750 MG: 750 TABLET ORAL at 12:11

## 2022-11-11 RX ADMIN — ACETAMINOPHEN 650 MG: 325 TABLET, FILM COATED ORAL at 12:11

## 2022-11-11 RX ADMIN — OXYCODONE HYDROCHLORIDE 5 MG: 5 TABLET ORAL at 08:11

## 2022-11-11 RX ADMIN — APIXABAN 5 MG: 5 TABLET, FILM COATED ORAL at 08:11

## 2022-11-11 RX ADMIN — METHOCARBAMOL 750 MG: 750 TABLET ORAL at 05:11

## 2022-11-11 RX ADMIN — FUROSEMIDE 60 MG: 20 TABLET ORAL at 08:11

## 2022-11-11 RX ADMIN — DOCUSATE SODIUM 100 MG: 100 CAPSULE, LIQUID FILLED ORAL at 08:11

## 2022-11-11 RX ADMIN — METHOCARBAMOL 750 MG: 750 TABLET ORAL at 04:11

## 2022-11-11 RX ADMIN — PANTOPRAZOLE SODIUM 40 MG: 40 TABLET, DELAYED RELEASE ORAL at 08:11

## 2022-11-11 RX ADMIN — ACETAMINOPHEN 650 MG: 325 TABLET, FILM COATED ORAL at 05:11

## 2022-11-11 RX ADMIN — ACETAMINOPHEN 650 MG: 325 TABLET, FILM COATED ORAL at 07:11

## 2022-11-11 RX ADMIN — DOCUSATE SODIUM 100 MG: 100 CAPSULE, LIQUID FILLED ORAL at 07:11

## 2022-11-11 NOTE — PT/OT/SLP PROGRESS
Physical Therapy Inpatient Rehab Treatment    Patient Name:  Niecy Joshua   MRN:  [id number]    Recommendations:     Discharge Recommendations:      Discharge Equipment Recommendations: walker, rolling   Barriers to discharge: Decreased caregiver support    Assessment:     Niecy Joshua is a 87 y.o. female admitted with a medical diagnosis of Closed comminuted intertrochanteric fracture of left femur with routine healing.  She presents with the following impairments/functional limitations:  weakness, impaired endurance, impaired functional mobility, gait instability, impaired balance, decreased lower extremity function, decreased safety awareness, pain, decreased ROM, edema.    Rehab Diagnosis: L femur fx s/p IM nail    Recent Surgery: * No surgery found *      General Precautions: Standard, fall     Orthopedic Precautions:LLE weight bearing as tolerated     Braces: N/A    Rehab Prognosis: Good; patient would benefit from acute skilled PT services to address these deficits and reach maximum level of function.      History:     Past Medical History:   Diagnosis Date    Cardiac arrhythmia     Low vitamin D level     Osteoporosis     Other pulmonary embolism without acute cor pulmonale        Past Surgical History:   Procedure Laterality Date    BREAST MASS EXCISION      x3  benign    LAPAROSCOPIC REPAIR OF INGUINAL HERNIA Bilateral        Subjective     Chief Complaint: Stiffness in L hip    Respiratory Status: Room air    Patients cultural, spiritual, Mandaen conflicts given the current situation: no      Objective:     Communicated with RN prior to session.  Patient found up in chair with peripheral IV, see catheter  upon PT entry to room.    Pt is Oriented x3 and Alert, Cooperative, and Motivated.    Vitals     Functional Mobility:        Current   Status   Discharge   Goal   Functional Area: Care Score:     Roll Left and Right    Independent   Sit to Lying 4 Performed on Mat; overall supervision; use of  leg  to LLE Independent   Lying to Sitting on Side of Bed 4 Performed on Mat; overall supervision; use of leg  to LLE Independent   Sit to Stand 4 With RW; overall supervision Independent   Chair/Bed-to-Chair Transfer 4 W/c <> Mat using stand step t/f with RW; overall CGA to trunk Independent   Car Transfer    Supervision or touching assistance   Walk 10 Feet    Independent   Walk 50 Feet with Two Turns    Supervision or touching assistance   Walk 150 Feet    Supervision or touching assistance   Walk 10 Feet Uneven Surface    Supervision or touching assistance   1 Step (Curb) 4  Supervision or touching assistance   4 Steps 4 4 steps on 4 in curb in // bars; overall CGA to trunk; BUE support Supervision or touching assistance   12 Steps    Partial/moderate assistance   Picking Up Object    Independent   Wheel 50 Feet with Two Turns 4  Independent   Wheel 150 Feet 4 ~180 ft with standard w/c using BUE propulsion; overall supervision Independent       Therapeutic Activities and Exercises:  Supine TherX: performed on Mat; 3 x 15 LLE  Quad set  Glute set  TKE on bolster  Hip and knee flexion  Hip abd/add   Ankle pumps      Activity Tolerance: Excellent    Patient left up in chair with all lines intact, call button in reach, and chair alarm on.    Education provided: roles and goals of PT/PTA, transfer training, bed mob, stair training, balance training, safety awareness, body mechanics, assistive device, strengthening exercises, and fall prevention    Expected compliance: High compliance    GOALS:   Multidisciplinary Problems       Physical Therapy Goals          Problem: Physical Therapy    Goal Priority Disciplines Outcome Goal Variances Interventions   Physical Therapy Goal     PT, PT/OT Ongoing, Progressing     Description: Bed Mobility:  Roll left and right with supervision/touching assist. (MET)  Sit to supine transfer with supervision/touching assist. (MET)  Supine to sit transfer with  supervision/touching assist. (MET)  Roll left and right independently  Sit to supine transfer with setup/clean-up assist  Supine to sit transfer with setup/clean-up assist      Transfers:  Sit to stand transfer with supervision/touching assist using RW. (MET)  Bed to chair transfer with supervision/touching assist using RW. (MET)  Car transfer with partial/moderate assist using RW. (MET)   an object from the ground in standing position with supervision/touching assist using RW and reacher. (MET)  Sit to stand transfer independently using RW  Bed to chair transfer with setup/clean-up assist using RW  Car transfer with setup/clean-up assist using RW   an object from the ground in standing position independently using RW        Mobility:  Ambulate 50 feet with supervision/touching assist using RW. (MET)  Ambulate 15 feet on uneven surfaces/ramps with partial/moderate assist using RW. (MET)  Ascend/descend a 4 inch curb with partial/moderate assist using RW.   Ascend/descend 5 stairs with partial/moderate assist using left handrail.  Ambulate 15 feet on uneven surfaces/ramps with setup/clean-up assist using RW  Ambulate 100 feet with supervision/touching assist using RW                           Plan:     During this hospitalization, patient to be seen daily (5-7x/week) to address the identified rehab impairments via gait training, therapeutic exercises, therapeutic activities, wheelchair management/training and progress toward the following goals:    Plan of Care Expires:  11/16/22  PT Next Visit Date: 11/16/22  Plan of Care reviewed with: patient    Additional Information:         Time Tracking:     Therapy Time  PT Received On: 11/11/22  PT Start Time: 1330  PT Stop Time: 1430  PT Total Time (min): 60 min   PT Individual: 60  Missed Time:    Time Missed due to:      Billable Minutes: Therapeutic Activity 30 and Therapeutic Exercise 30    11/11/2022

## 2022-11-11 NOTE — PROGRESS NOTES
11/11/22 0900   Rec Therapy Time Calculation   Date of Treatment 11/11/22   Rec Start Time 0900   Rec Stop Time 0930   Rec Total Time (min) 30 min   Time   Treatment time 2 units   Precautions   General Precautions fall   Orthopedic Precautions  LLE weight bearing as tolerated   Braces N/A   Pain/Comfort   Pain Rating 1 no pain   OTHER   Rehab identified problem list/impairments weakness;impaired endurance;impaired functional mobility;gait instability;decreased lower extremity function   Values/Beliefs/Spiritual Care   Spiritual, Cultural Beliefs, Adventist Practices, Values that Affect Care no   Overall Level of Functioning   Activity Tolerance Independent   Dynamic Sitting Balance/Reaching Does not occur   Dynamic Standing Balance/Reaching Mod Indep   Right UE Coodination/Dexterity Independent   Left UE Coordination/Dexterity Independent   Problem Solving/Sequencing Skills Independent   Memory Recall Independent   R/L Neglect/Inattention Does not occur   Attention Span Independent   Recreational Therapy Short Term Goals   Short Term Goal 1 Progression Met   Short Term Goal 2 Progression Met   Recreational Therapy Long Term Goals   Long Term Goal 1 Progression Progressing   Long Term Goal 2 Progression Met   Plan   Patient to be seen Daily   Planned Duration 1 week   Treatments Planned Energy conservation training   Treatment plan/goals estblished with Patient/Caregiver Yes

## 2022-11-11 NOTE — PLAN OF CARE
"Car heights:  one car is 29" and other is 24"    Steps:  largest is 4.5" and other three are 4"    Bed:  26"      "

## 2022-11-11 NOTE — PT/OT/SLP PROGRESS
Occupational Therapy Inpatient Rehab Treatment    Name: Niecy Joshua  MRN: [id number]    Assessment:  Niecy Joshua is a 87 y.o. female admitted with a medical diagnosis of Closed comminuted intertrochanteric fracture of left femur with routine healing.  She presents with the following impairments/functional limitations:  weakness, impaired endurance, impaired self care skills, impaired functional mobility, gait instability, impaired balance, decreased upper extremity function, decreased safety awareness, pain, decreased coordination, impaired sensation.    General Precautions: Standard, fall     Orthopedic Precautions:LLE weight bearing as tolerated     Braces: N/A    Rehab Prognosis: Good; patient would benefit from acute skilled OT services to address these deficits and reach maximum level of function.      History:     Past Medical History:   Diagnosis Date    Cardiac arrhythmia     Low vitamin D level     Osteoporosis     Other pulmonary embolism without acute cor pulmonale        Past Surgical History:   Procedure Laterality Date    BREAST MASS EXCISION      x3  benign    LAPAROSCOPIC REPAIR OF INGUINAL HERNIA Bilateral        Subjective     Orientation: Oriented x4    Chief Complaint: rough night with bowels    Patient/Family Comments/goals: Pt stated he didn't sleep well.    Respiratory Status: Room air    Patients cultural, spiritual, Mu-ism conflicts given the current situation: no       Objective:     Patient found up in chair upon OT entry to room.    Mobility   Patient completed:  Sit to Stand Transfer with independence with rolling walker  Stand to Sit Transfer with independence with rolling walker  Toilet Transfer Stand Pivot technique with supervision with  rolling walker  Tub Transfer Stand Pivot technique with supervision with rolling walker    Functional Mobility  In room FM with RW with supervision.    ADLs   Current Status   Oral Hygiene 6   Shower, Bathe Self 4   Upper Body Dressing 6    Lower Body Dressing 4   Toileting Hygiene 3   Toilet Transfer 4   Putting On, Taking Off Footwear 4     Limiting Factors for ADLs: motor, sensory, endurance, limited ROM, balance, weakness, coordination, and safety awareness     Patient left up in chair with all lines intact, call button in reach, and chair alarm on.     Education provided: Roles and goals of OT, ADLs, transfer training, assistive device, wheelchair precautions, sequencing, safety precautions, fall prevention, equipment recommendations, and home safety    Multidisciplinary Problems       Occupational Therapy Goals          Problem: Occupational Therapy    Goal Priority Disciplines Outcome Interventions   Occupational Therapy Goal     OT, PT/OT Ongoing, Progressing    Description: ADLs:  Pt to perform grooming tasks with set up and standing at the sink.  Pt to perform UB dressing with set up   Pt to perform LB dressing with Partial/mod assist with AE as needed.  Pt to perform putting on/off footwear task with Max assist with AE as needed  Pt to perform toileting with partial/mod assist    Functional Transfers:  Pt to perform toilet transfers with partial/mod assist and RW  Pt to perform a tub transfer with partial/mod assist and RW/TTB  Pt to perform a walk-in shower transfer with partial/mod assist and RW    IADLs:  Pt to perform simple meal prep with partial/mod assist with RW     Balance, Strengthening, Endurance, Balance:  Pt to consistently demonstrate adherence to orthopedic precautions during all ADL's as instructed by OT.  Pt to demonstrate fair dynamic standing balance as required to perform ADL's from standing level.  Pt to demonstrate good BUE strength during functional task   Pt to demonstrate consistent adherence to breathing control and energy conservation techniques as educated by OT.                        Time Tracking     OT Received On: 11/11/22  Time In 0800     Time Out 0900  Total Time 60 min  Therapy Time: OT Individual:  60  Missed Time:    Missed Time Reason:      Billable Minutes: Self Care/Home Management 60    11/11/2022

## 2022-11-11 NOTE — PT/OT/SLP PROGRESS
Physical Therapy Inpatient Rehab Treatment    Patient Name:  Niecy Joshua   MRN:  [id number]    Recommendations:     Discharge Recommendations:      Discharge Equipment Recommendations: walker, rolling   Barriers to discharge: Decreased caregiver support    Assessment:     Niecy Joshua is a 87 y.o. female admitted with a medical diagnosis of Closed comminuted intertrochanteric fracture of left femur with routine healing.  She presents with the following impairments/functional limitations:  weakness, impaired endurance, impaired self care skills, impaired functional mobility, gait instability, impaired balance, decreased lower extremity function, decreased safety awareness, pain, decreased ROM, orthopedic precautions, impaired muscle length, impaired coordination .    Rehab Diagnosis: L femur fx s/p IM nail    Recent Surgery: * No surgery found *      General Precautions: Standard, fall     Orthopedic Precautions:LLE weight bearing as tolerated     Braces: N/A    Rehab Prognosis: Good; patient would benefit from acute skilled PT services to address these deficits and reach maximum level of function.      History:     Past Medical History:   Diagnosis Date    Cardiac arrhythmia     Low vitamin D level     Osteoporosis     Other pulmonary embolism without acute cor pulmonale        Past Surgical History:   Procedure Laterality Date    BREAST MASS EXCISION      x3  benign    LAPAROSCOPIC REPAIR OF INGUINAL HERNIA Bilateral        Subjective     Chief Complaint: restroom needs     Respiratory Status: Room air    Patients cultural, spiritual, Yarsanism conflicts given the current situation: no      Objective:     Communicated with nursing  prior to session.  Patient found up in chair with see catheter, peripheral IV  upon PT entry to room.    Pt is Oriented x3 and Alert and Cooperative.  Functional Mobility:   Transfers:     Sit to Stand: Supervision or touching assistance  with rolling walker  Toilet Transfer:  Supervision or touching assistance  with  rolling walker  using  Step Transfer and post accidental bm assist with cleaning   Gait: Pt ambulates 80ft 2 trials required increased time slow tara seated rest break b/t vc for step through gt pattern and increase ANT  with RW with supervision with increased time .   Dynamic standing balance stood 5-7min for assist with cleaning after accidental bm and assisting with changing diaper pt also sat for bm 2nd time     Activity Tolerance: Good    Patient left up in chair with call button in reach, chair alarm on, and pt jamie tx requires much increased time with activities slow transition use of rw for safety/fall prevention .    Education provided: transfer training, gait training, balance training, safety awareness, assistive device, and fall prevention    Expected compliance: High compliance    GOALS:   Multidisciplinary Problems       Physical Therapy Goals          Problem: Physical Therapy    Goal Priority Disciplines Outcome Goal Variances Interventions   Physical Therapy Goal     PT, PT/OT Ongoing, Progressing     Description: Bed Mobility:  Roll left and right with supervision/touching assist. (MET)  Sit to supine transfer with supervision/touching assist. (MET)  Supine to sit transfer with supervision/touching assist. (MET)  Roll left and right independently  Sit to supine transfer with setup/clean-up assist  Supine to sit transfer with setup/clean-up assist      Transfers:  Sit to stand transfer with supervision/touching assist using RW. (MET)  Bed to chair transfer with supervision/touching assist using RW. (MET)  Car transfer with partial/moderate assist using RW. (MET)   an object from the ground in standing position with supervision/touching assist using RW and reacher. (MET)  Sit to stand transfer independently using RW  Bed to chair transfer with setup/clean-up assist using RW  Car transfer with setup/clean-up assist using RW   an object from the  ground in standing position independently using RW        Mobility:  Ambulate 50 feet with supervision/touching assist using RW. (MET)  Ambulate 15 feet on uneven surfaces/ramps with partial/moderate assist using RW. (MET)  Ascend/descend a 4 inch curb with partial/moderate assist using RW.   Ascend/descend 5 stairs with partial/moderate assist using left handrail.  Ambulate 15 feet on uneven surfaces/ramps with setup/clean-up assist using RW  Ambulate 100 feet with supervision/touching assist using RW                           Plan:     During this hospitalization, patient to be seen daily (5-7x/week) to address the identified rehab impairments via gait training, therapeutic exercises, therapeutic activities, wheelchair management/training and progress toward the following goals:    Plan of Care Expires:  11/16/22  PT Next Visit Date: 11/16/22  Plan of Care reviewed with: patient    Additional Information:         Time Tracking:     Therapy Time  PT Received On: 11/11/22  PT Start Time: 1100  PT Stop Time: 1200  PT Total Time (min): 60 min   PT Individual: 60  Missed Time:    Time Missed due to:      Billable Minutes: Gait Training 30min and Therapeutic Activity 30min    11/11/2022

## 2022-11-11 NOTE — PT/OT/SLP PROGRESS
Recreational Therapy Treatment    Date of Treatment: 11/11/22  Start Time: 0900  Stop Time: 0930  Total Time: 30 min  Missed Time:     General Precautions: fall  Ortho Precautions: LLE weight bearing as tolerated  Braces: N/A    Vitals   Vitals at Rest  BP    HR    O2 Sat    Pain      Vitals With Activity  BP    HR    O2 Sat    Pain        Treatment     Cognitive Skills Building   Cognitive Observation Activity Assist Position Equipment Response            Comment:          Dynamic Activities   Activity Assist Position Equipment Response   Activity 1 Bowling modified independence Standing Rolling walker and Rubber bowling balls good   Comment: Sit to stand was setup as was dynamic standing balance/reaching. Standing tolerance was 4 minutes. UE coordination and sequencing skills were I.        Fine Motor Activities   Activity Assist Position Equipment Response           Comment:          Additional Info: Pt ws motivated and determined    Goals     Short Term Goals    Goal  Goal Status   Will increase sit to stand to supervision Met   Will improve dynamic standing balance/reaching to supervision Met                 Long Term Goals    Goal Goal Status   Will increase standing tolerance to 5 mintues Progressing   Will improve dynamic standing balance/reaching to setup Met

## 2022-11-11 NOTE — PROGRESS NOTES
Inpatient Nutrition Evaluation    Admit Date: 11/3/2022   Total duration of encounter: 8 days    Nutrition Recommendation/Prescription     Continue current diet as tolerated   2.  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 b.i.d., normocytic anemia, GERD, vitamin-D deficiency, osteoporosis    Nutrition-Related Medications: lasix, pantoprazole, lactulose, Kcl, miralax      Nutrition-Related Labs: 11/11: Gluc 116(H), BUN 28.7(H), H/H 10.2/31.6(L)  11/4: Iron 23(L), TIBC 141(L), Iron Sat 16(L), Gluc 140(H), BUN 41.5(H), Ca 8.3(L), Alb 2.7(L), PAB 9.7(L)     Diet Order: Diet Adult Regular  Oral Supplement Order: none  Appetite/Oral Intake: good/% of meals  Factors Affecting Nutritional Intake: none identified  Food/Congregation/Cultural Preferences: none reported  Food Allergies: none reported    Skin Integrity: incision, bruised (ecchymotic)  Wound(s):  Incision     Comments  11/11: Pt reports good appetite. Observed ~75% breakfast tray consumed. Per EMR, is averaging ~75% meals x last 3 days. Po intake improving. Denies any further nutrition related issues at this time.     11/4:  Pt reports fair appetite, eating ~50% meals. Tells me is decreased from usual. Having loose stools-colace being held per NP note. No meals documented per EMR at this time. Offered pt ONS to assist her in meeting est needs. Pt politely declines. Encouraged protein intake at meals. Noted low PAB - likely d/t inflammation s/p surgery. Will monitor. Denies any recent wt loss. Tells me has UBW of 160 lbs. Per wt hx EMR, has wt of 165 lbs on (10/31).     Anthropometrics    11/4: No new wt, needs new wt     BMI Classification: overweight (BMI 25-29.9)    Usual Weight Provided By:  patient 157 lbs     Wt Readings from Last 5 Encounters:   11/05/22 76.4 kg (168 lb 6.9 oz)   10/31/22 74.8 kg (165 lb)   08/09/22 73.6 kg (162 lb 3.2 oz)     Weight Change(s) Since Admission:  Admit Weight: 76.4 kg (168 lb 6.9 oz) (11/05/22 0526)No admit wt at this time     Estimated/Assessed Needs     Weight Used For Calorie Calculations: 74.8 kg   Energy Calorie Requirements (kcal): 1472 kcal/day  Energy Need Method: MSJ x 1.2 SF  Weight Used For Protein Calculations: 74.8 kg   Protein Requirements: 89 g  Protein Need Method:  1.2 g/kg   Fluid Requirements (mL): 1870  Estimated Fluid Requirement Method: 25 mL/kg     Patient Education    Not applicable.    Monitoring & Evaluation     Dietitian will monitor food and beverage intake, weight, and weight change.  Nutrition Risk/Follow-Up: low (follow-up in 5-7 days)  Patients assigned 'low nutrition risk' status do not qualify for a full nutritional assessment but will be monitored and re-evaluated in a 5-7 day time period. Please consult if re-evaluation needed sooner.

## 2022-11-11 NOTE — PLAN OF CARE
"Spoke with son Lukas ([phone]).      Requested entrance step height:  they are 4" steps (modified from original brick steps)    Car height:  will call me back    Bed height:  will call me back    Rails on steps:  L sided railing with wall on right (wide steps, so he will measure to determine if patient can reach both concurrently)    Home health:  family has recommended [facility].  FOC being placed on chart.  "

## 2022-11-11 NOTE — PROGRESS NOTES
[facility] ([facility])  Rehab Progress Note    Patient Name: Niecy Joshua  MRN: [id number]  Age: 87 y.o. Sex: female  : 1935  Hospital Length of Stay: 8 days  Date of Service: 2022   Chief Complaint: Left hip fracture s/p IMN on 2022    Subjective:     Basic Information  Admit Information: 87-year-old white female presented to [facility] ED on 10/31/2022 complaining of left hip pain after falling from ground level.  PMH significant for unprovoked DVT diagnosed in  and osteoporosis.  Work up significant for hip fracture.  Tolerated left IMN on  without perioperative complications.  Orthopedic surgery recommended WBAT to RLE.  H&H continues to trend downward with hemoglobin 7.1 requiring 1 unit PRBC transfusion.  Tolerated transfer to [facility] inpatient rehab unit on  without incident.  Today's Information: No acute events overnight.  Sitting up in bed.  Reports good sleep overnight.  Reports large BM yesterday evening.  Appetite is good.  Decreased swelling to left lower extremity.  Vital signs at goal.  H&H stable.  BMP unremarkable.  No imaging today.    Review of patient's allergies indicates:  No Known Allergies     Current Facility-Administered Medications:     0.9%  NaCl infusion (for blood administration), , Intravenous, Q24H PRN, Luis Felipe A Julius, FNP    acetaminophen tablet 650 mg, 650 mg, Oral, Q4H PRN, Luis Felipe A Julius, FNP    acetaminophen tablet 650 mg, 650 mg, Oral, TID, Dilcia Ragland, FNP, 650 mg at 22 0507    ALPRAZolam tablet 0.25 mg, 0.25 mg, Oral, TID PRN, Luis Felipe A Julius, FNP    apixaban tablet 5 mg, 5 mg, Oral, BID, Luis Felipe A Julius, FNP, 5 mg at 22 0841    benzonatate capsule 100 mg, 100 mg, Oral, TID PRN, Luis Felipe A Julius, FNP    bisacodyL suppository 10 mg, 10 mg, Rectal, Daily PRN, Luis Felipe A Julius, FNP    docusate sodium capsule 100 mg, 100 mg, Oral, BID, Luis Felipe A Julius, FNP, 100 mg at 22  0841    furosemide tablet 60 mg, 60 mg, Oral, Daily, Luis Felipe A Julius, FNP, 60 mg at 11/11/22 0841    hydrALAZINE injection 10 mg, 10 mg, Intravenous, Q4H PRN, Luis Felipe A Julius, FNP    hydrOXYzine pamoate capsule 50 mg, 50 mg, Oral, Nightly PRN, Luis Felipe A Julius, FNP    labetalol 20 mg/4 mL (5 mg/mL) IV syring, 10 mg, Intravenous, Q4H PRN, Luis Felipe A Julius, FNP    methocarbamoL tablet 750 mg, 750 mg, Oral, QID, Luis Felipe A Julius, FNP, 750 mg at 11/11/22 0508    metoprolol injection 10 mg, 10 mg, Intravenous, Q2H PRN, Luis Felipe A Julius, FNP    naloxegoL (MOVANTIK) tablet 25 mg, 25 mg, Oral, Daily, Luis Felipe A Julius, FNP, 25 mg at 11/11/22 0841    nitroGLYCERIN SL tablet 0.4 mg, 0.4 mg, Sublingual, Q5 Min PRN, Luis Felipe A Julius, FNP    ondansetron disintegrating tablet 4 mg, 4 mg, Oral, Q6H PRN, Luis Felipe A Julius, FNP    oxyCODONE immediate release tablet 5 mg, 5 mg, Oral, Q4H PRN, Luis Felipe A Julius, FNP, 5 mg at 11/11/22 0841    pantoprazole EC tablet 40 mg, 40 mg, Oral, Daily, Luis Felipe A Julius, FNP, 40 mg at 11/11/22 0841    polyethylene glycol packet 17 g, 17 g, Oral, BID PRN, Luis Felipe A Julius, FNP, 17 g at 11/08/22 0829    promethazine tablet 25 mg, 25 mg, Oral, Q6H PRN, Luis Felipe A Julius, FNP     Review of Systems   Complete 12-point review of symptoms negative except for what's mentioned in HPI     Objective:     /64   Pulse 89   Temp 97.8 °F (36.6 °C) (Oral)   Resp 20   Wt 76.4 kg (168 lb 6.9 oz)   SpO2 95%   BMI 26.38 kg/m²        Intake/Output Summary (Last 24 hours) at 11/11/2022 1008  Last data filed at 11/11/2022 0651  Gross per 24 hour   Intake 720 ml   Output 850 ml   Net -130 ml         Physical Exam  Constitutional:       Appearance: Normal appearance.   HENT:      Head: Normocephalic.      Mouth/Throat:      Mouth: Mucous membranes are moist.   Eyes:      Pupils: Pupils are equal, round, and reactive to light.   Cardiovascular:      Rate and Rhythm: Normal  rate and regular rhythm.      Heart sounds: Normal heart sounds.      Comments: Regular rate with intermittent pauses.  LLE 2+ edema  Pulmonary:      Effort: Pulmonary effort is normal.      Breath sounds: Normal breath sounds.   Abdominal:      General: Bowel sounds are normal.      Palpations: Abdomen is soft.   Genitourinary:     Comments: Indwelling catheter  Musculoskeletal:      Cervical back: Neck supple.      Comments: Generalized weakness and muscle atrophy.  Left hip dressing clean and intact-moderate swelling appreciated.    Skin:     General: Skin is warm and dry.   Neurological:      General: No focal deficit present.      Mental Status: She is alert and oriented to person, place, and time.   Psychiatric:         Mood and Affect: Mood normal.         Behavior: Behavior normal.         Thought Content: Thought content normal.         Judgment: Judgment normal.   *MD performed and documented physical examination         Lines/Drains/Airways       Drain  Duration                  Urethral Catheter 11/10/22 0640 1 day                    Labs  Recent Results (from the past 24 hour(s))   Basic Metabolic Panel    Collection Time: 11/11/22  6:02 AM   Result Value Ref Range    Sodium Level 139 136 - 145 mmol/L    Potassium Level 4.0 3.5 - 5.1 mmol/L    Chloride 107 98 - 107 mmol/L    Carbon Dioxide 26 23 - 31 mmol/L    Glucose Level 116 (H) 82 - 115 mg/dL    Blood Urea Nitrogen 28.7 (H) 9.8 - 20.1 mg/dL    Creatinine 0.71 0.55 - 1.02 mg/dL    BUN/Creatinine Ratio 40     Calcium Level Total 8.4 8.4 - 10.2 mg/dL    Anion Gap 6.0 mEq/L    eGFR >60 mls/min/1.73/m2   Magnesium    Collection Time: 11/11/22  6:02 AM   Result Value Ref Range    Magnesium Level 2.10 1.60 - 2.60 mg/dL   Hemoglobin and Hematocrit    Collection Time: 11/11/22  6:06 AM   Result Value Ref Range    Hgb 10.2 (L) 12.0 - 16.0 gm/dL    Hct 31.6 (L) 37.0 - 47.0 %       Radiology  Left hip XR on 11/01/2022 at 9:34 a.m., IMPRESSION:Improved alignment  following internal fixation of the left femur.    Assessment/Plan:     87 y.o. WF admitted on 11/3/2022    Left hip fracture   - s/p IMN on 11/01/2022  - WBAT to RLE  - staples to remove on 11/14/2022  - initiate   K-Dur 20 mEq daily (initiated 11/11)  - continue    Lasix 60 mg daily oral (initiated 11/9)                Oxycodone 5 mg q.4 hours p.r.n.                 Robaxin 750 mg q.i.d.                 Tylenol 650 mg t.i.d.  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 2020  - continue                Eliquis 5 mg b.i.d.(resumed 11/7)     Normocytic anemia  - asymptomatic  - s/p transfusion   x2 units PRBC on 11/4/2022                x1 units PRBC on 11/3/2022  - H/H trending up  - no evidence of active bleeds  - will closely monitor and transfuse if needed      GERD  - Avoid spicy foods, and nothing to eat or drink within x2 hours of bedtime or laying flat (water is ok)   - Avoid NSAIDs (Advil, ibuprofen, naproxen...) and shankar-2 inhibitors (Mobic, Celebrex)    - continue                Protonix 40 mg daily      Vitamin D deficiency  - vitamin-D level 32.0 on 11/05  - continue                Vitamin-D 5000 units daily      Constipation  - last BM 11/10- large  - refusing suppository  - continue  Colace 100 mg BID   Miralax 17 gm BID PRN     Urinary retention   - current   - Indwelling catheter reinserted 11/2   - discontinue indwelling catheter 11/9  - reinserted on 11/10     VTE Prophylaxis:  Eliquis 5 mg b.i.d.-resumed 11/7    COVID-19 testing:  Negative on 11/03/2022  COVID-19 vaccination status:  Vaccinated (Moderna):  07/23/2021 and 08/20/2021, and received 2 boosters     POA: Yes  Living will: Yes  Contacts: Lukas Joshua (son) [phone]                     Marya Gaytan (daughter) [phone]     CODE STATUS: Full Code  Internal Medicine (attending): Cb Fall MD  Physiatry (consulting):  Toño Polk MD      OUTPATIENT PROVIDERS  PCP:  King Duvall MD  Orthopedic surgery:  Florentino Friend MD     DISPOSITION: Condition stable.  Sleep hygiene, bowel maintenance, and appetite at goal.  Reports large BM on 11/10.  Vital signs at goal with no recorded fevers.  BMP unremarkable.  H&H stable.  Continue Lasix 60 mg daily.  Monitor closely.  Notify of acute changes.  Initiate potassium 20 mEq daily and repeat BMP on Monday.    Staffing 11/7/2022: Continent of bowel.  Indwelling catheter to be discontinued on Wednesday. RT: Overall supervision.  Limited by left hip pain.  Improving.  Appetite is good OTW.  PT: Overall mod assist for bed mobility and transfers.  Ambulating 22 feet with walker.  OT: overall max to mod assist with LE dressing.  Limited by pain, balance, and endurance.  Needs more time. Projected discharge 11/17.     Brooks Madrid NP conducted independent physical examination and assisted with medical documentation.    Total time spent on this encounter including chart review and direct MD + NP 1-on-1 patient interaction: 42 minutes   Over 50% of this time was spent in counseling and coordination of care

## 2022-11-12 PROCEDURE — 94799 UNLISTED PULMONARY SVC/PX: CPT

## 2022-11-12 PROCEDURE — 11800000 HC REHAB PRIVATE ROOM

## 2022-11-12 PROCEDURE — 97110 THERAPEUTIC EXERCISES: CPT | Mod: CQ

## 2022-11-12 PROCEDURE — 25000003 PHARM REV CODE 250: Performed by: NURSE PRACTITIONER

## 2022-11-12 PROCEDURE — 97530 THERAPEUTIC ACTIVITIES: CPT | Mod: CQ

## 2022-11-12 PROCEDURE — 97116 GAIT TRAINING THERAPY: CPT | Mod: CQ

## 2022-11-12 RX ADMIN — APIXABAN 5 MG: 5 TABLET, FILM COATED ORAL at 08:11

## 2022-11-12 RX ADMIN — METHOCARBAMOL 750 MG: 750 TABLET ORAL at 05:11

## 2022-11-12 RX ADMIN — ACETAMINOPHEN 650 MG: 325 TABLET, FILM COATED ORAL at 09:11

## 2022-11-12 RX ADMIN — DOCUSATE SODIUM 100 MG: 100 CAPSULE, LIQUID FILLED ORAL at 09:11

## 2022-11-12 RX ADMIN — APIXABAN 5 MG: 5 TABLET, FILM COATED ORAL at 09:11

## 2022-11-12 RX ADMIN — METHOCARBAMOL 750 MG: 750 TABLET ORAL at 10:11

## 2022-11-12 RX ADMIN — FUROSEMIDE 60 MG: 20 TABLET ORAL at 08:11

## 2022-11-12 RX ADMIN — PANTOPRAZOLE SODIUM 40 MG: 40 TABLET, DELAYED RELEASE ORAL at 08:11

## 2022-11-12 RX ADMIN — OXYCODONE HYDROCHLORIDE 5 MG: 5 TABLET ORAL at 09:11

## 2022-11-12 RX ADMIN — POTASSIUM CHLORIDE 20 MEQ: 20 TABLET, EXTENDED RELEASE ORAL at 08:11

## 2022-11-12 RX ADMIN — ACETAMINOPHEN 650 MG: 325 TABLET, FILM COATED ORAL at 03:11

## 2022-11-12 RX ADMIN — ACETAMINOPHEN 650 MG: 325 TABLET, FILM COATED ORAL at 06:11

## 2022-11-12 RX ADMIN — METHOCARBAMOL 750 MG: 750 TABLET ORAL at 12:11

## 2022-11-12 RX ADMIN — METHOCARBAMOL 750 MG: 750 TABLET ORAL at 06:11

## 2022-11-12 RX ADMIN — METHOCARBAMOL 750 MG: 750 TABLET ORAL at 08:11

## 2022-11-12 NOTE — PT/OT/SLP PROGRESS
Physical Therapy Inpatient Rehab Treatment    Patient Name:  Niecy Joshua   MRN:  [id number]    Recommendations:     Discharge Recommendations:      Discharge Equipment Recommendations: walker, rolling   Barriers to discharge: Decreased caregiver support    Assessment:     Niecy Joshua is a 87 y.o. female admitted with a medical diagnosis of Closed comminuted intertrochanteric fracture of left femur with routine healing.  She presents with the following impairments/functional limitations:  weakness, impaired endurance, impaired functional mobility, gait instability, decreased lower extremity function, pain, decreased ROM, orthopedic precautions .    Rehab Diagnosis: L femur fx s/p IM nail    Recent Surgery: * No surgery found *      General Precautions: Standard, fall     Orthopedic Precautions:LLE weight bearing as tolerated     Braces: N/A    Rehab Prognosis: Good; patient would benefit from acute skilled PT services to address these deficits and reach maximum level of function.      History:     Past Medical History:   Diagnosis Date    Cardiac arrhythmia     Low vitamin D level     Osteoporosis     Other pulmonary embolism without acute cor pulmonale        Past Surgical History:   Procedure Laterality Date    BREAST MASS EXCISION      x3  benign    LAPAROSCOPIC REPAIR OF INGUINAL HERNIA Bilateral        Subjective     Chief Complaint: initial c/o L ankle pain due to swelling of LLE but better with mobility    Respiratory Status: Room air    Patients cultural, spiritual, Muslim conflicts given the current situation: no      Objective:      Patient found up in chair with see catheter, peripheral IV  upon PT entry to room.    Pt is Oriented x3 and Alert and Cooperative.    Vitals   Vitals at Rest  BP    HR    O2 Sat    Pain      Vitals With Activity  BP    HR    O2 Sat    Pain        Functional Mobility:   Transfers:     Sit to Stand: Supervision or touching assistance  with rolling walker  Gait: Pt  ambulates 55 ft then 70 ft with RW with Supervision or touching assistance .   Wheelchair Propulsion:  Pt propelled Standard wheelchair x 50 feet on Level tile with  Bilateral upper extremity with Set-up or clean-up assistance .      Current   Status  Discharge   Goal   Functional Area: Care Score:    Roll Left and Right   Independent   Sit to Lying   Independent   Lying to Sitting on Side of Bed   Independent   Sit to Stand 4 Independent   Chair/Bed-to-Chair Transfer   Independent   Car Transfer   Supervision or touching assistance   Walk 10 Feet 4 Independent   Walk 50 Feet with Two Turns 4 Supervision or touching assistance   Walk 150 Feet   Supervision or touching assistance   Walk 10 Feet Uneven Surface   Supervision or touching assistance   1 Step (Curb)   Supervision or touching assistance   4 Steps   Supervision or touching assistance   12 Steps   Partial/moderate assistance   Picking Up Object   Independent   Wheel 50 Feet with Two Turns 4 Independent   Wheel 150 Feet   Independent       Therapeutic Activities and Exercises:  Seated there-ex with RTB 2 x 10 each : ankle pumps, LAQ, marching, hams. Curls and hip abd./add.  Sit <> stand from w/c <> RW 2 x 10 with SPV  -donned B socks and B shoes prior to session with adaptive devices with setup assist.    Activity Tolerance: Excellent    Patient left up in chair with all lines intact and call button in reach.    Education provided: transfer training, strengthening exercises, and hip precautions    Expected compliance: High compliance    GOALS:   Multidisciplinary Problems       Physical Therapy Goals          Problem: Physical Therapy    Goal Priority Disciplines Outcome Goal Variances Interventions   Physical Therapy Goal     PT, PT/OT Ongoing, Progressing     Description: Bed Mobility:  Roll left and right with supervision/touching assist. (MET)  Sit to supine transfer with supervision/touching assist. (MET)  Supine to sit transfer with supervision/touching  assist. (MET)  Roll left and right independently  Sit to supine transfer with setup/clean-up assist  Supine to sit transfer with setup/clean-up assist      Transfers:  Sit to stand transfer with supervision/touching assist using RW. (MET)  Bed to chair transfer with supervision/touching assist using RW. (MET)  Car transfer with partial/moderate assist using RW. (MET)   an object from the ground in standing position with supervision/touching assist using RW and reacher. (MET)  Sit to stand transfer independently using RW  Bed to chair transfer with setup/clean-up assist using RW  Car transfer with setup/clean-up assist using RW   an object from the ground in standing position independently using RW        Mobility:  Ambulate 50 feet with supervision/touching assist using RW. (MET)  Ambulate 15 feet on uneven surfaces/ramps with partial/moderate assist using RW. (MET)  Ascend/descend a 4 inch curb with partial/moderate assist using RW.   Ascend/descend 5 stairs with partial/moderate assist using left handrail.  Ambulate 15 feet on uneven surfaces/ramps with setup/clean-up assist using RW  Ambulate 100 feet with supervision/touching assist using RW                           Plan:     During this hospitalization, patient to be seen daily (5-7x/week) to address the identified rehab impairments via gait training, therapeutic exercises, therapeutic activities, wheelchair management/training and progress toward the following goals:    Plan of Care Expires:  11/16/22  PT Next Visit Date: 11/16/22  Plan of Care reviewed with: patient    Additional Information:         Time Tracking:     Therapy Time  PT Received On: 11/12/22  PT Start Time: 1040  PT Stop Time: 1140  PT Total Time (min): 60 min   PT Individual: 60  Missed Time:    Time Missed due to:      Billable Minutes: Gait Training 15, Therapeutic Activity 30, and Therapeutic Exercise 15    11/12/2022

## 2022-11-12 NOTE — PROGRESS NOTES
[facility] ([facility])  Rehab Progress Note    Patient Name: Niecy Joshua  MRN: [id number]  Age: 87 y.o. Sex: female  : 1935  Hospital Length of Stay: 9 days  Date of Service: 2022   Chief Complaint: Left hip fracture s/p IMN on 2022    Subjective:     Basic Information  Admit Information: 87-year-old white female presented to [facility] ED on 10/31/2022 complaining of left hip pain after falling from ground level.  PMH significant for unprovoked DVT diagnosed in  and osteoporosis.  Work up significant for hip fracture.  Tolerated left IMN on  without perioperative complications.  Orthopedic surgery recommended WBAT to RLE.  H&H continues to trend downward with hemoglobin 7.1 requiring 1 unit PRBC transfusion.  Tolerated transfer to [facility] inpatient rehab unit on  without incident.  Today's Information: No acute events overnight.  Sitting comfortably in wheelchair at bedside.   and daughter present.  Pleasant mood.  No complaints.  Reports good sleep and appetite.  Continues to do many word search puzzles in her downtime.  Compliant with therapies.  No bowel or bladder issues reported.  Last reported BM on .  Vitals stable with no recent recorded fevers.  No new labs or imaging.    Review of patient's allergies indicates:  No Known Allergies     Current Facility-Administered Medications:     0.9%  NaCl infusion (for blood administration), , Intravenous, Q24H PRN, Luis Felipe FUNES Julius, FNP    acetaminophen tablet 650 mg, 650 mg, Oral, Q4H PRN, Luis Felipe FUNES Julius, FNP    acetaminophen tablet 650 mg, 650 mg, Oral, TID, Dilcia FUNES Ellyn, FNP, 650 mg at 22 0632    ALPRAZolam tablet 0.25 mg, 0.25 mg, Oral, TID PRN, Luis Felipe Starkehart, FNP    apixaban tablet 5 mg, 5 mg, Oral, BID, Luis Felipe A Julius, FNP, 5 mg at 22 0813    benzonatate capsule 100 mg, 100 mg, Oral, TID PRN, Luis Felipe Madrid, FNP    bisacodyL suppository 10 mg, 10 mg,  Rectal, Daily PRN, Luis Felipe A Julius, FNP    docusate sodium capsule 100 mg, 100 mg, Oral, BID, Luis Felipe A Julius, FNP, 100 mg at 11/11/22 1959    furosemide tablet 60 mg, 60 mg, Oral, Daily, Luis Felipe A Julius, FNP, 60 mg at 11/12/22 0813    hydrALAZINE injection 10 mg, 10 mg, Intravenous, Q4H PRN, Luis Felipe A Julius, FNP    hydrOXYzine pamoate capsule 50 mg, 50 mg, Oral, Nightly PRN, Luis Felipe A Julius, FNP    labetalol 20 mg/4 mL (5 mg/mL) IV syring, 10 mg, Intravenous, Q4H PRN, Luis Felipe A Julius, FNP    methocarbamoL tablet 750 mg, 750 mg, Oral, QID, Luis Felipe A Julius, FNP, 750 mg at 11/12/22 1253    metoprolol injection 10 mg, 10 mg, Intravenous, Q2H PRN, Luis Felipe A Julius, FNP    naloxegoL (MOVANTIK) tablet 25 mg, 25 mg, Oral, Daily, Luis Felipe A Julius, FNP, 25 mg at 11/11/22 0841    nitroGLYCERIN SL tablet 0.4 mg, 0.4 mg, Sublingual, Q5 Min PRN, Luis Felipe A Julius, FNP    ondansetron disintegrating tablet 4 mg, 4 mg, Oral, Q6H PRN, Luis Felipe A Julius, FNP    oxyCODONE immediate release tablet 5 mg, 5 mg, Oral, Q4H PRN, Luis Felipe A Julius, FNP, 5 mg at 11/11/22 2000    pantoprazole EC tablet 40 mg, 40 mg, Oral, Daily, Luis Felipe A Julius, FNP, 40 mg at 11/12/22 0813    polyethylene glycol packet 17 g, 17 g, Oral, BID PRN, Luis Felipe A Julius, FNP, 17 g at 11/08/22 0829    potassium chloride SA CR tablet 20 mEq, 20 mEq, Oral, Daily, Luis Felipe A Julius, FNP, 20 mEq at 11/12/22 0813    promethazine tablet 25 mg, 25 mg, Oral, Q6H PRN, Luis Felipe A Julius, FNP     Review of Systems   Complete 12-point review of symptoms negative except for what's mentioned in HPI     Objective:     /74   Pulse 86   Temp 98 °F (36.7 °C) (Oral)   Resp 16   Wt 77.2 kg (170 lb 3.1 oz)   SpO2 95%   BMI 26.66 kg/m²      Physical Exam  Constitutional:       Appearance: Normal appearance.   HENT:      Head: Normocephalic.      Mouth/Throat:      Mouth: Mucous membranes are moist.   Eyes:      Pupils: Pupils  are equal, round, and reactive to light.   Cardiovascular:      Rate and Rhythm: Normal rate and regular rhythm.      Heart sounds: Normal heart sounds.      Comments: Regular rate with intermittent pauses  Pulmonary:      Effort: Pulmonary effort is normal.      Breath sounds: Normal breath sounds.   Abdominal:      General: Bowel sounds are normal.      Palpations: Abdomen is soft.   Genitourinary:     Comments: Indwelling catheter  Musculoskeletal:      Cervical back: Neck supple.      Right lower leg: No edema.      Left lower leg: Edema present.      Comments: Generalized weakness and muscle atrophy.  Left hip dressing clean and intact-moderate swelling appreciated.    Skin:     General: Skin is warm and dry.   Neurological:      General: No focal deficit present.      Mental Status: She is alert and oriented to person, place, and time.   Psychiatric:         Mood and Affect: Mood normal.         Behavior: Behavior normal.         Thought Content: Thought content normal.         Judgment: Judgment normal.     Lines/Drains/Airways       Drain  Duration                  Urethral Catheter 11/10/22 0640 2 days                Labs  No results found for this or any previous visit (from the past 24 hour(s)).    Radiology  Left hip XR on 11/01/2022 at 9:34 a.m., IMPRESSION:Improved alignment following internal fixation of the left femur.    Assessment/Plan:     87 y.o. WF admitted on 11/3/2022    Left hip fracture   - s/p IMN on 11/01/2022  - WBAT to RLE  - staples to remove on 11/14/2022  - continue   K-Dur 20 mEq daily (initiated 11/11)  Lasix 60 mg daily oral (initiated 11/9)  Oxycodone 5 mg q.4 hours p.r.n.   Robaxin 750 mg q.i.d.   Tylenol 650 mg t.i.d.  - defer to physiatry for rehab and pain management  - PT/OT/RT/ST following     Unprovoked left lower extremity DVT  - Diagnosed in 2020  - continue  Eliquis 5 mg b.i.d.(resumed 11/7)     Normocytic anemia  - asymptomatic  - s/p transfusion  x2 units PRBC on  11/4/2022  x1 units PRBC on 11/3/2022  - H/H trending up  - no evidence of active bleeds  - will closely monitor and transfuse if needed      GERD  - Avoid spicy foods, and nothing to eat or drink within x2 hours of bedtime or laying flat (water is ok)   - Avoid NSAIDs (Advil, ibuprofen, naproxen...) and shankar-2 inhibitors (Mobic, Celebrex)    - continue  Protonix 40 mg daily      Vitamin D deficiency  - vitamin-D level 32.0 on 11/05  - continue  Vitamin-D 5000 units daily      Constipation  - last BM 11/12  - continue  Colace 100 mg BID   Miralax 17 gm BID PRN     Urinary retention   - current   - Indwelling catheter reinserted 11/2, discontinued indwelling catheter 11/9, reinserted on 11/10  - plan to re-attempt to discontinue on 11/15     VTE Prophylaxis:  Eliquis 5 mg b.i.d.-resumed 11/7    COVID-19 testing:  Negative on 11/03/2022  COVID-19 vaccination status:  Vaccinated (Moderna):  07/23/2021 and 08/20/2021, and received 2 boosters     POA: Yes  Living will: Yes  Contacts: Lukas Joshua (son) [phone]                     Marya Gaytan (daughter) [phone]     CODE STATUS: Full Code  Internal Medicine (attending): Cb Fall MD  Physiatry (consulting):  Toño Polk MD     OUTPATIENT PROVIDERS  PCP:  King Duvall MD  Orthopedic surgery:  Florentino Friend MD     DISPOSITION: Condition stable.  Sleep hygiene, bowel maintenance, and appetite at goal.   Last reported BM on 11/12.  Vitals stable with no recent recorded fevers.  No new labs or imaging.  Out of bed WA as tolerated.  Encourage nutrition and hydration.  Continue aggressive therapies and nutritional support.  Monitor closely and notify with any acute changes.      Staffing 11/7/2022: Continent of bowel.  Indwelling catheter to be discontinued on Wednesday. RT: Overall supervision.  Limited by left hip pain.  Improving.  Appetite is good OTW.  PT: Overall mod assist for bed mobility and transfers.  Ambulating 22 feet with walker.  OT:  overall max to mod assist with LE dressing.  Limited by pain, balance, and endurance.  Needs more time. Projected discharge 11/17.     Total time spent on this encounter including chart review and direct 1-on-1 patient interaction: 39 minutes   Over 50% of this time was spent in counseling and coordination of care

## 2022-11-13 PROCEDURE — 94799 UNLISTED PULMONARY SVC/PX: CPT

## 2022-11-13 PROCEDURE — 11800000 HC REHAB PRIVATE ROOM

## 2022-11-13 PROCEDURE — 25000003 PHARM REV CODE 250: Performed by: NURSE PRACTITIONER

## 2022-11-13 RX ADMIN — APIXABAN 5 MG: 5 TABLET, FILM COATED ORAL at 08:11

## 2022-11-13 RX ADMIN — METHOCARBAMOL 750 MG: 750 TABLET ORAL at 11:11

## 2022-11-13 RX ADMIN — ACETAMINOPHEN 650 MG: 325 TABLET, FILM COATED ORAL at 01:11

## 2022-11-13 RX ADMIN — POTASSIUM CHLORIDE 20 MEQ: 20 TABLET, EXTENDED RELEASE ORAL at 08:11

## 2022-11-13 RX ADMIN — PANTOPRAZOLE SODIUM 40 MG: 40 TABLET, DELAYED RELEASE ORAL at 08:11

## 2022-11-13 RX ADMIN — METHOCARBAMOL 750 MG: 750 TABLET ORAL at 05:11

## 2022-11-13 RX ADMIN — METHOCARBAMOL 750 MG: 750 TABLET ORAL at 01:11

## 2022-11-13 RX ADMIN — ACETAMINOPHEN 650 MG: 325 TABLET, FILM COATED ORAL at 09:11

## 2022-11-13 RX ADMIN — FUROSEMIDE 60 MG: 20 TABLET ORAL at 08:11

## 2022-11-13 RX ADMIN — APIXABAN 5 MG: 5 TABLET, FILM COATED ORAL at 09:11

## 2022-11-13 RX ADMIN — ACETAMINOPHEN 650 MG: 325 TABLET, FILM COATED ORAL at 05:11

## 2022-11-13 NOTE — PROGRESS NOTES
[facility] ([facility])  Rehab Progress Note    Patient Name: Niecy Joshua  MRN: [id number]  Age: 87 y.o. Sex: female  : 1935  Hospital Length of Stay: 10 days  Date of Service: 2022   Chief Complaint: Left hip fracture s/p IMN on 2022    Subjective:     Basic Information  Admit Information: 87-year-old white female presented to [facility] ED on 10/31/2022 complaining of left hip pain after falling from ground level.  PMH significant for unprovoked DVT diagnosed in  and osteoporosis.  Work up significant for hip fracture.  Tolerated left IMN on  without perioperative complications.  Orthopedic surgery recommended WBAT to RLE.  H&H continues to trend downward with hemoglobin 7.1 requiring 1 unit PRBC transfusion.  Tolerated transfer to [facility] inpatient rehab unit on  without incident.  Today's Information: No acute events overnight.  Resting in bed.  Reports good sleep and appetite.  BM at goal.  Vital signs and BM at goal.  No labs or imaging today.      Review of patient's allergies indicates:  No Known Allergies     Current Facility-Administered Medications:     0.9%  NaCl infusion (for blood administration), , Intravenous, Q24H PRN, Luis Felipe A Julius, FNP    acetaminophen tablet 650 mg, 650 mg, Oral, Q4H PRN, Luis Felipe A Julius, FNP    acetaminophen tablet 650 mg, 650 mg, Oral, TID, Dilcia FUNES [employer], FNP, 650 mg at 22    ALPRAZolam tablet 0.25 mg, 0.25 mg, Oral, TID PRN, Luis Felipe A Julius, FNP    apixaban tablet 5 mg, 5 mg, Oral, BID, Luis Felipe A Julius, FNP, 5 mg at 22    benzonatate capsule 100 mg, 100 mg, Oral, TID PRN, Luis Felipe A Julius, FNP    bisacodyL suppository 10 mg, 10 mg, Rectal, Daily PRN, Luis Felipe A Julius, FNP    docusate sodium capsule 100 mg, 100 mg, Oral, BID, Luis Felipe A Julius, FNP, 100 mg at 22    furosemide tablet 60 mg, 60 mg, Oral, Daily, LLUVIA Camargo, 60 mg at 22 5990     hydrALAZINE injection 10 mg, 10 mg, Intravenous, Q4H PRN, Luis Felipe A Julius, FNP    hydrOXYzine pamoate capsule 50 mg, 50 mg, Oral, Nightly PRN, Luis Felipe A Julius, FNP    labetalol 20 mg/4 mL (5 mg/mL) IV syring, 10 mg, Intravenous, Q4H PRN, Luis Felipe A Julius, FNP    methocarbamoL tablet 750 mg, 750 mg, Oral, QID, Luis Felipe A Julius, FNP, 750 mg at 11/12/22 2200    metoprolol injection 10 mg, 10 mg, Intravenous, Q2H PRN, Luis Felipe A Julius, FNP    naloxegoL (MOVANTIK) tablet 25 mg, 25 mg, Oral, Daily, Luis Felipe A Julius, FNP, 25 mg at 11/11/22 0841    nitroGLYCERIN SL tablet 0.4 mg, 0.4 mg, Sublingual, Q5 Min PRN, Luis Felipe A Julius, FNP    ondansetron disintegrating tablet 4 mg, 4 mg, Oral, Q6H PRN, Luis Felipe A Julius, FNP    oxyCODONE immediate release tablet 5 mg, 5 mg, Oral, Q4H PRN, Luis Felipe A Julius, FNP, 5 mg at 11/12/22 2115    pantoprazole EC tablet 40 mg, 40 mg, Oral, Daily, Luis Felipe A Julius, FNP, 40 mg at 11/12/22 0813    polyethylene glycol packet 17 g, 17 g, Oral, BID PRN, Luis Felipe A Julius, FNP, 17 g at 11/08/22 0829    potassium chloride SA CR tablet 20 mEq, 20 mEq, Oral, Daily, Luis Felipe A Julius, FNP, 20 mEq at 11/12/22 0813    promethazine tablet 25 mg, 25 mg, Oral, Q6H PRN, Luis Felipe A Julius, FNP     Review of Systems   Complete 12-point review of symptoms negative except for what's mentioned in HPI     Objective:     /67   Pulse 80   Temp 98.1 °F (36.7 °C) (Oral)   Resp 18   Wt 77.2 kg (170 lb 3.1 oz)   SpO2 95%   BMI 26.66 kg/m²      Physical Exam  Constitutional:       Appearance: Normal appearance.   HENT:      Head: Normocephalic.      Mouth/Throat:      Mouth: Mucous membranes are moist.   Eyes:      Pupils: Pupils are equal, round, and reactive to light.   Cardiovascular:      Rate and Rhythm: Normal rate and regular rhythm.      Heart sounds: Normal heart sounds.      Comments: Regular rate with intermittent pauses  Pulmonary:      Effort: Pulmonary effort  is normal.      Breath sounds: Normal breath sounds.   Abdominal:      General: Bowel sounds are normal.      Palpations: Abdomen is soft.   Genitourinary:     Comments: Indwelling catheter  Musculoskeletal:      Cervical back: Neck supple.      Right lower leg: No edema.      Left lower leg: Edema present.      Comments: Generalized weakness and muscle atrophy.  Left hip dressing clean and intact-moderate swelling appreciated.    Skin:     General: Skin is warm and dry.   Neurological:      General: No focal deficit present.      Mental Status: She is alert and oriented to person, place, and time.   Psychiatric:         Mood and Affect: Mood normal.         Behavior: Behavior normal.         Thought Content: Thought content normal.         Judgment: Judgment normal.     Lines/Drains/Airways       Drain  Duration                  Urethral Catheter 11/10/22 0640 2 days                Labs  No results found for this or any previous visit (from the past 24 hour(s)).    Radiology  Left hip XR on 11/01/2022 at 9:34 a.m., IMPRESSION:Improved alignment following internal fixation of the left femur.    Assessment/Plan:     87 y.o. WF admitted on 11/3/2022    Left hip fracture   - s/p IMN on 11/01/2022  - WBAT to RLE  - staples to remove on 11/14/2022  - continue   K-Dur 20 mEq daily (initiated 11/11)  Lasix 60 mg daily oral (initiated 11/9)  Oxycodone 5 mg q.4 hours p.r.n.   Robaxin 750 mg q.i.d.   Tylenol 650 mg t.i.d.  - defer to physiatry for rehab and pain management  - PT/OT/RT/ST following     Unprovoked left lower extremity DVT  - Diagnosed in 2020  - continue  Eliquis 5 mg b.i.d.(resumed 11/7)     Normocytic anemia  - asymptomatic  - s/p transfusion  x2 units PRBC on 11/4/2022  x1 units PRBC on 11/3/2022  - H/H trending up  - no evidence of active bleeds  - will closely monitor and transfuse if needed      GERD  - Avoid spicy foods, and nothing to eat or drink within x2 hours of bedtime or laying flat (water is ok)    - Avoid NSAIDs (Advil, ibuprofen, naproxen...) and shankar-2 inhibitors (Mobic, Celebrex)    - continue  Protonix 40 mg daily      Vitamin D deficiency  - vitamin-D level 32.0 on 11/05  - continue  Vitamin-D 5000 units daily      Constipation  - last BM 11/12  - continue  Colace 100 mg BID   Miralax 17 gm BID PRN     Urinary retention   - current   - Indwelling catheter reinserted 11/2, discontinued indwelling catheter 11/9, reinserted on 11/10  - plan to re-attempt to discontinue on 11/15     VTE Prophylaxis:  Eliquis 5 mg b.i.d.-resumed 11/7    COVID-19 testing:  Negative on 11/03/2022  COVID-19 vaccination status:  Vaccinated (Moderna):  07/23/2021 and 08/20/2021, and received 2 boosters     POA: Yes  Living will: Yes  Contacts: Lukas Joshua (son) [phone]                     Marya Gaytan (daughter) [phone]     CODE STATUS: Full Code  Internal Medicine (attending): Cb Fall MD  Physiatry (consulting):  Toño Polk MD     OUTPATIENT PROVIDERS  PCP:  King Duvall MD  Orthopedic surgery:  Florentino Friend MD     DISPOSITION: Condition stable.  Sleep hygiene, bowel maintenance, and appetite at goal.   Last BM on 11/12.  Vitals stable with no recent recorded fevers.  No new labs or imaging.   Continue aggressive therapies and nutritional support.  Monitor closely and notify with any acute changes.      Staffing 11/7/2022: Continent of bowel.  Indwelling catheter to be discontinued on Wednesday. RT: Overall supervision.  Limited by left hip pain.  Improving.  Appetite is good OTW.  PT: Overall mod assist for bed mobility and transfers.  Ambulating 22 feet with walker.  OT: overall max to mod assist with LE dressing.  Limited by pain, balance, and endurance.  Needs more time. Projected discharge 11/17.     Total time spent on this encounter including chart review and direct 1-on-1 patient interaction: 36 minutes   Over 50% of this time was spent in counseling and coordination of care

## 2022-11-13 NOTE — PLAN OF CARE
Problem: Skin Injury Risk Increased  Goal: Skin Health and Integrity  Outcome: Ongoing, Progressing  Intervention: Optimize Skin Protection  Flowsheets (Taken 11/12/2022 1933)  Pressure Reduction Techniques: frequent weight shift encouraged  Pressure Reduction Devices: pressure-redistributing mattress utilized  Head of Bed (HOB) Positioning: HOB at 30-45 degrees     Problem: Fall Injury Risk  Goal: Absence of Fall and Fall-Related Injury  Outcome: Ongoing, Progressing  Intervention: Identify and Manage Contributors  Flowsheets (Taken 11/12/2022 1932)  Self-Care Promotion: safe use of adaptive equipment encouraged  Medication Review/Management:   high-risk medications identified   medications reviewed

## 2022-11-14 LAB
ALBUMIN SERPL-MCNC: 2.8 GM/DL (ref 3.4–4.8)
ALBUMIN/GLOB SERPL: 1 RATIO (ref 1.1–2)
ALP SERPL-CCNC: 365 UNIT/L (ref 40–150)
ALT SERPL-CCNC: 57 UNIT/L (ref 0–55)
AST SERPL-CCNC: 38 UNIT/L (ref 5–34)
BASOPHILS # BLD AUTO: 0.03 X10(3)/MCL (ref 0–0.2)
BASOPHILS NFR BLD AUTO: 0.4 %
BILIRUBIN DIRECT+TOT PNL SERPL-MCNC: 1.3 MG/DL
BUN SERPL-MCNC: 30.1 MG/DL (ref 9.8–20.1)
CALCIUM SERPL-MCNC: 8.4 MG/DL (ref 8.4–10.2)
CHLORIDE SERPL-SCNC: 109 MMOL/L (ref 98–107)
CO2 SERPL-SCNC: 26 MMOL/L (ref 23–31)
CREAT SERPL-MCNC: 0.71 MG/DL (ref 0.55–1.02)
EOSINOPHIL # BLD AUTO: 0.23 X10(3)/MCL (ref 0–0.9)
EOSINOPHIL NFR BLD AUTO: 2.9 %
ERYTHROCYTE [DISTWIDTH] IN BLOOD BY AUTOMATED COUNT: 19.8 % (ref 11.5–17)
GFR SERPLBLD CREATININE-BSD FMLA CKD-EPI: >60 MLS/MIN/1.73/M2
GLOBULIN SER-MCNC: 2.7 GM/DL (ref 2.4–3.5)
GLUCOSE SERPL-MCNC: 121 MG/DL (ref 82–115)
HCT VFR BLD AUTO: 31.2 % (ref 37–47)
HGB BLD-MCNC: 10.2 GM/DL (ref 12–16)
IMM GRANULOCYTES # BLD AUTO: 0.07 X10(3)/MCL (ref 0–0.04)
IMM GRANULOCYTES NFR BLD AUTO: 0.9 %
LYMPHOCYTES # BLD AUTO: 1.9 X10(3)/MCL (ref 0.6–4.6)
LYMPHOCYTES NFR BLD AUTO: 24.2 %
MAGNESIUM SERPL-MCNC: 2.3 MG/DL (ref 1.6–2.6)
MCH RBC QN AUTO: 29.3 PG (ref 27–31)
MCHC RBC AUTO-ENTMCNC: 32.7 MG/DL (ref 33–36)
MCV RBC AUTO: 89.7 FL (ref 80–94)
MONOCYTES # BLD AUTO: 1 X10(3)/MCL (ref 0.1–1.3)
MONOCYTES NFR BLD AUTO: 12.7 %
NEUTROPHILS # BLD AUTO: 4.6 X10(3)/MCL (ref 2.1–9.2)
NEUTROPHILS NFR BLD AUTO: 58.9 %
NRBC BLD AUTO-RTO: 0 %
PHOSPHATE SERPL-MCNC: 2.7 MG/DL (ref 2.3–4.7)
PLATELET # BLD AUTO: 317 X10(3)/MCL (ref 130–400)
PMV BLD AUTO: 9.9 FL (ref 7.4–10.4)
POTASSIUM SERPL-SCNC: 4.3 MMOL/L (ref 3.5–5.1)
PROT SERPL-MCNC: 5.5 GM/DL (ref 5.8–7.6)
RBC # BLD AUTO: 3.48 X10(6)/MCL (ref 4.2–5.4)
SODIUM SERPL-SCNC: 141 MMOL/L (ref 136–145)
WBC # SPEC AUTO: 7.9 X10(3)/MCL (ref 4.5–11.5)

## 2022-11-14 PROCEDURE — 83735 ASSAY OF MAGNESIUM: CPT | Performed by: NURSE PRACTITIONER

## 2022-11-14 PROCEDURE — 97535 SELF CARE MNGMENT TRAINING: CPT

## 2022-11-14 PROCEDURE — 84100 ASSAY OF PHOSPHORUS: CPT | Performed by: NURSE PRACTITIONER

## 2022-11-14 PROCEDURE — 99233 PR SUBSEQUENT HOSPITAL CARE,LEVL III: ICD-10-PCS | Mod: ,,, | Performed by: NURSE PRACTITIONER

## 2022-11-14 PROCEDURE — 25000003 PHARM REV CODE 250: Performed by: NURSE PRACTITIONER

## 2022-11-14 PROCEDURE — 11800000 HC REHAB PRIVATE ROOM

## 2022-11-14 PROCEDURE — 99233 SBSQ HOSP IP/OBS HIGH 50: CPT | Mod: ,,, | Performed by: NURSE PRACTITIONER

## 2022-11-14 PROCEDURE — 85025 COMPLETE CBC W/AUTO DIFF WBC: CPT | Performed by: NURSE PRACTITIONER

## 2022-11-14 PROCEDURE — 97530 THERAPEUTIC ACTIVITIES: CPT | Mod: CQ

## 2022-11-14 PROCEDURE — 97530 THERAPEUTIC ACTIVITIES: CPT

## 2022-11-14 PROCEDURE — 80053 COMPREHEN METABOLIC PANEL: CPT | Performed by: NURSE PRACTITIONER

## 2022-11-14 PROCEDURE — 99900035 HC TECH TIME PER 15 MIN (STAT)

## 2022-11-14 PROCEDURE — 36415 COLL VENOUS BLD VENIPUNCTURE: CPT | Performed by: NURSE PRACTITIONER

## 2022-11-14 PROCEDURE — 94799 UNLISTED PULMONARY SVC/PX: CPT

## 2022-11-14 RX ADMIN — METHOCARBAMOL 750 MG: 750 TABLET ORAL at 11:11

## 2022-11-14 RX ADMIN — METHOCARBAMOL 750 MG: 750 TABLET ORAL at 12:11

## 2022-11-14 RX ADMIN — METHOCARBAMOL 750 MG: 750 TABLET ORAL at 05:11

## 2022-11-14 RX ADMIN — APIXABAN 5 MG: 5 TABLET, FILM COATED ORAL at 08:11

## 2022-11-14 RX ADMIN — ACETAMINOPHEN 650 MG: 325 TABLET, FILM COATED ORAL at 12:11

## 2022-11-14 RX ADMIN — POTASSIUM CHLORIDE 20 MEQ: 20 TABLET, EXTENDED RELEASE ORAL at 08:11

## 2022-11-14 RX ADMIN — FUROSEMIDE 60 MG: 20 TABLET ORAL at 08:11

## 2022-11-14 RX ADMIN — PANTOPRAZOLE SODIUM 40 MG: 40 TABLET, DELAYED RELEASE ORAL at 08:11

## 2022-11-14 RX ADMIN — OXYCODONE HYDROCHLORIDE 5 MG: 5 TABLET ORAL at 08:11

## 2022-11-14 RX ADMIN — NALOXEGOL OXALATE 25 MG: 25 TABLET, FILM COATED ORAL at 08:11

## 2022-11-14 RX ADMIN — ACETAMINOPHEN 650 MG: 325 TABLET, FILM COATED ORAL at 05:11

## 2022-11-14 RX ADMIN — DOCUSATE SODIUM 100 MG: 100 CAPSULE, LIQUID FILLED ORAL at 08:11

## 2022-11-14 RX ADMIN — OXYCODONE HYDROCHLORIDE 5 MG: 5 TABLET ORAL at 12:11

## 2022-11-14 RX ADMIN — ACETAMINOPHEN 650 MG: 325 TABLET, FILM COATED ORAL at 08:11

## 2022-11-14 NOTE — PT/OT/SLP PROGRESS
Physical Therapy Inpatient Rehab Treatment    Patient Name:  Niecy Joshua   MRN:  [id number]    Recommendations:     Discharge Recommendations:      Discharge Equipment Recommendations: walker, rolling   Barriers to discharge: None    Assessment:     Niecy Joshua is a 87 y.o. female admitted with a medical diagnosis of Closed comminuted intertrochanteric fracture of left femur with routine healing.  She presents with the following impairments/functional limitations:  weakness, impaired endurance, impaired functional mobility, gait instability, impaired balance, decreased safety awareness, pain, decreased ROM .    Rehab Diagnosis: L femur fx s/p IM nail    Recent Surgery: * No surgery found *      General Precautions: Standard, fall     Orthopedic Precautions:LLE weight bearing as tolerated     Braces: N/A    Rehab Prognosis: Good; patient would benefit from acute skilled PT services to address these deficits and reach maximum level of function.      History:     Past Medical History:   Diagnosis Date    Cardiac arrhythmia     Low vitamin D level     Osteoporosis     Other pulmonary embolism without acute cor pulmonale        Past Surgical History:   Procedure Laterality Date    BREAST MASS EXCISION      x3  benign    LAPAROSCOPIC REPAIR OF INGUINAL HERNIA Bilateral        Subjective     Chief Complaint: N/A    Respiratory Status: Room air    Patients cultural, spiritual, [employer] conflicts given the current situation: no      Objective:     Communicated with RN prior to session.  Patient found  sitting in w/c with family present   with   all needs in reach upon PT entry to room.    Pt is Oriented x3 and Alert, Cooperative, and Motivated.        Functional Mobility:        Current   Status  Discharge   Goal   Functional Area: Care Score:    Roll Left and Right 6 Independent   Sit to Lying 5  With leg   Independent   Lying to Sitting on Side of Bed 5 Independent   Sit to Stand 6 Independent    Chair/Bed-to-Chair Transfer 6  With RW  Independent   Car Transfer 4  With RW and Leg  overall CGA  Supervision or touching assistance   Walk 10 Feet   Independent   Walk 50 Feet with Two Turns   Supervision or touching assistance   Walk 150 Feet   Supervision or touching assistance   Walk 10 Feet Uneven Surface   Supervision or touching assistance   1 Step (Curb) 4 Supervision or touching assistance   4 Steps 4  L rails only with overall CGA and SBA Supervision or touching assistance   12 Steps 88 Partial/moderate assistance   Picking Up Object   Independent   Wheel 50 Feet with Two Turns   Independent   Wheel 150 Feet   Independent       Therapeutic Activities and Exercises:  Educted pt and family on proper guarding with stairs and car t/f. Educated on HEP program.     Patient's ambulation and functional ADL's cannot be safely satisfied with the use of a single AD.  Patient is restricted secondary to WBAT to LLE, demonstrates weakness of BLE and BUE and significant balance deficits.  Patient requires a rolling walker for functional mobility and dynamic standing balance activities during ADLO's and for fall prevention.  The previously stated deficits in strength make ambulation a high energy task for this patient.  The patient demonstrates decreased functional endurance and requires frequent seated rest breaks during ambulation, therefore, the patient will also require the use of a wheelchair.     Activity Tolerance: Excellent    Patient left up in chair with all lines intact, call button in reach, and family present.    Education provided: roles and goals of PT/PTA, transfer training, bed mob, gait training, stair training, balance training, safety awareness, body mechanics, assistive device, wheelchair management, and strengthening exercises    Expected compliance: High compliance    GOALS:   Multidisciplinary Problems       Physical Therapy Goals          Problem: Physical Therapy    Goal Priority  Disciplines Outcome Goal Variances Interventions   Physical Therapy Goal     PT, PT/OT Ongoing, Progressing     Description: Bed Mobility:  Roll left and right with supervision/touching assist. (MET)  Sit to supine transfer with supervision/touching assist. (MET)  Supine to sit transfer with supervision/touching assist. (MET)  Roll left and right independently  Sit to supine transfer with setup/clean-up assist  Supine to sit transfer with setup/clean-up assist      Transfers:  Sit to stand transfer with supervision/touching assist using RW. (MET)  Bed to chair transfer with supervision/touching assist using RW. (MET)  Car transfer with partial/moderate assist using RW. (MET)   an object from the ground in standing position with supervision/touching assist using RW and reacher. (MET)  Sit to stand transfer independently using RW  Bed to chair transfer with setup/clean-up assist using RW  Car transfer with setup/clean-up assist using RW   an object from the ground in standing position independently using RW        Mobility:  Ambulate 50 feet with supervision/touching assist using RW. (MET)  Ambulate 15 feet on uneven surfaces/ramps with partial/moderate assist using RW. (MET)  Ascend/descend a 4 inch curb with partial/moderate assist using RW.   Ascend/descend 5 stairs with partial/moderate assist using left handrail.  Ambulate 15 feet on uneven surfaces/ramps with setup/clean-up assist using RW  Ambulate 100 feet with supervision/touching assist using RW                           Plan:     During this hospitalization, patient to be seen daily (5-7x/week) to address the identified rehab impairments via gait training, therapeutic exercises, therapeutic activities, wheelchair management/training and progress toward the following goals:    Plan of Care Expires:  11/16/22  PT Next Visit Date: 11/16/22  Plan of Care reviewed with: patient, family    Additional Information:         Time Tracking:     Therapy  Time  PT Received On: 11/14/22  PT Start Time: 1300  PT Stop Time: 1400  PT Total Time (min): 60 min   PT Individual: 60  Missed Time:    Time Missed due to:      Billable Minutes: Community/Work Re-entry 60    11/14/2022

## 2022-11-14 NOTE — PLAN OF CARE
11/14/22 0612        Incision/Site 11/01/22 0828 Left Leg   Date First Assessed/Time First Assessed: 11/01/22 0828   Side: Left  Location: Leg   Dressing Appearance Dry   Dressing Changed;Island/border

## 2022-11-14 NOTE — PT/OT/SLP PROGRESS
"Occupational Therapy Inpatient Rehab Treatment    Name: Niecy Joshua  MRN: [id number]    Assessment:  Niecy Joshua is a 87 y.o. female admitted with a medical diagnosis of Closed comminuted intertrochanteric fracture of left femur with routine healing.  She presents with the following impairments/functional limitations:  weakness, impaired endurance, impaired self care skills, impaired functional mobility, gait instability, impaired balance, impaired cognition, decreased coordination, decreased upper extremity function, decreased lower extremity function, decreased safety awareness, edema.    General Precautions: Standard, fall     Orthopedic Precautions:LLE weight bearing as tolerated     Braces: N/A    Rehab Prognosis: Good; patient would benefit from acute skilled OT services to address these deficits and reach maximum level of function.      History:     Past Medical History:   Diagnosis Date    Cardiac arrhythmia     Low vitamin D level     Osteoporosis     Other pulmonary embolism without acute cor pulmonale        Past Surgical History:   Procedure Laterality Date    BREAST MASS EXCISION      x3  benign    LAPAROSCOPIC REPAIR OF INGUINAL HERNIA Bilateral        Subjective     Orientation: Oriented x4    Chief Complaint: wanting to get see out    Patient/Family Comments/goals: "to go home Thursday"    Respiratory Status: Room air    Patients cultural, spiritual, L[employer] day conflicts given the current situation: no       Objective:     Patient found up in chair with  (3 children present for family training)  upon OT entry to room.    Mobility   Patient completed:  Sit to Stand Transfer with independence with rolling walker  Stand to Sit Transfer with independence with rolling walker  Shower Transfer Stand Pivot technique with supervision with rolling walker    ADLs  Children present for family training.  Reviewed need for grab bars in shower at home and other walker accessories to assist in pt's " independence at home. Recommended removal of throw mats at home. Reviewed equipment needs and practiced using AE for LE dressing. Answered all questions of family.    Limiting Factors for ADLs: motor, endurance, limited ROM, balance, weakness, coordination, and safety awareness     Patient left up in chair with all lines intact and call button in reach.     Education provided: Roles and goals of OT, ADLs, transfer training, assistive device, sequencing, safety precautions, fall prevention, equipment recommendations, and home safety    Multidisciplinary Problems       Occupational Therapy Goals          Problem: Occupational Therapy    Goal Priority Disciplines Outcome Interventions   Occupational Therapy Goal     OT, PT/OT Ongoing, Progressing    Description: ADLs:  Pt to perform grooming tasks with set up and standing at the sink.  Pt to perform UB dressing with set up   Pt to perform LB dressing with Partial/mod assist with AE as needed.  Pt to perform putting on/off footwear task with Max assist with AE as needed  Pt to perform toileting with partial/mod assist    Functional Transfers:  Pt to perform toilet transfers with partial/mod assist and RW  Pt to perform a tub transfer with partial/mod assist and RW/TTB  Pt to perform a walk-in shower transfer with partial/mod assist and RW    IADLs:  Pt to perform simple meal prep with partial/mod assist with RW     Balance, Strengthening, Endurance, Balance:  Pt to consistently demonstrate adherence to orthopedic precautions during all ADL's as instructed by OT.  Pt to demonstrate fair dynamic standing balance as required to perform ADL's from standing level.  Pt to demonstrate good BUE strength during functional task   Pt to demonstrate consistent adherence to breathing control and energy conservation techniques as educated by OT.                        Time Tracking     OT Received On: 11/14/22  Time In 1400     Time Out 1500  Total Time 60 min  Therapy Time: OT  Individual: 60  Missed Time:    Missed Time Reason:      Billable Minutes: Self Care/Home Management 60    11/14/2022

## 2022-11-14 NOTE — PROGRESS NOTES
Subjective:  HPI: 87 y.o. WF with PMH of DVT, osteoporosis, and vitamin-D deficiency presented to [facility] ED on 10/31/2022 for mechanical fall.  Patient reports she was walking to the bathroom when she fell onto her left hip.  Patient denies hitting head, LOC, chest pain, shortness of breath, abdominal pain, nausea, vomiting, dizziness, syncope, and fever.  Patient reports improvement of left hip pain after pain medications given in ED. Initial vital signs in the ED were /68, pulse 70, respirations 16, temperature 36.8° C, and SpO2 97%.  Labs revealed WBC 14.8, chloride 109, BUN 22.4, and glucose 148.  Chest x-ray revealed no acute pulmonary process.  Left hip x-ray revealed comminuted left intertrochanteric femur fracture.  Orthopedics was consulted with plans for surgery. Patient was admitted to hospital medicine service for further medical management. She underwent left intertrochanteric femur fracture intramedullary nailing on 11/1 with Dr. Friend. Participating with therapy. Functional status includes bed mobility and transfers requiring minimum assistance. Amb w/RW for 5 ft. Patient was evaluated, accepted, and admitted to inpatient rehab to improve functional status. Transferred to [facility] on 11/3 without incident.    11/14: Seen with therapy, seated in WC heading to and from her room. Staff notes incontinent loose bowels following medication for constipation. IM plans to discontinue see catheter again tomorrow. If continued urinary retention, see will be replaced and patient will f/u with Urology outpatient. VSSAF.     Review of Systems  Depression/Anxiety: no complaints     ALPRAZolam tablet 0.25 mg TID PRN Anxiety  Pain: left hip with movement-controlled     acetaminophen tablet 650 mg TID  methocarbamoL tablet 750 mg TID  acetaminophen tablet 650 mg q4hr PRN mild pain  oxyCODONE immediate release tablet 5 mg q4hr PRN mod pain  Bowels/Bladder: last BM 11/14 (loose incontinent stools), 11/6 following  enema, 11/5 following suppository- not feeling fully evacuated; 11/3 (loose, incontinent-small amount)   DC Mccarty 11/9-replaced 11/10 2/2 continued retention   Appetite: decreased from baseline-improving    Sleep: good          Physical Exam  General: well-developed, well-nourished, in no acute distress  Respiratory: equal chest rise, no SOB, no audible wheeze  Cardiovascular: regular rate and rhythm, BLE edema  Gastrointestinal: soft, non-tender, non-distended   Musculoskeletal: decreased ROM/strength to LLE  Integumentary: no rashes or skin lesions present, Left hip incisions x 4-staples, dressings-drainage saturated dressing   Neurologic: cranial nerves intact, no signs of peripheral neurological deficit, motor/sensory function intact      Labs:   Latest Reference Range & Units 11/14/22 05:19   WBC 4.5 - 11.5 x10(3)/mcL 7.9   RBC 4.20 - 5.40 x10(6)/mcL 3.48 (L)   Hemoglobin 12.0 - 16.0 gm/dL 10.2 (L)   Hematocrit 37.0 - 47.0 % 31.2 (L)   MCV 80.0 - 94.0 fL 89.7   MCH 27.0 - 31.0 pg 29.3   MCHC 33.0 - 36.0 mg/dL 32.7 (L)   RDW 11.5 - 17.0 % 19.8 (H)   Platelets 130 - 400 x10(3)/mcL 317   MPV 7.4 - 10.4 fL 9.9   Neut % % 58.9   LYMPH % % 24.2   Mono % % 12.7   Eosinophil % % 2.9   Basophil % % 0.4   Immature Granulocytes % 0.9   Neut # 2.1 - 9.2 x10(3)/mcL 4.6   Lymph # 0.6 - 4.6 x10(3)/mcL 1.90   Mono # 0.1 - 1.3 x10(3)/mcL 1.00   Eos # 0 - 0.9 x10(3)/mcL 0.23   Baso # 0 - 0.2 x10(3)/mcL 0.03   Immature Grans (Abs) 0 - 0.04 x10(3)/mcL 0.07 (H)   nRBC % 0.0   Sodium 136 - 145 mmol/L 141   Potassium 3.5 - 5.1 mmol/L 4.3   Chloride 98 - 107 mmol/L 109 (H)   CO2 23 - 31 mmol/L 26   BUN 9.8 - 20.1 mg/dL 30.1 (H)   Creatinine 0.55 - 1.02 mg/dL 0.71   eGFR mls/min/1.73/m2 >60   Glucose 82 - 115 mg/dL 121 (H)   Calcium 8.4 - 10.2 mg/dL 8.4   Phosphorus 2.3 - 4.7 mg/dL 2.7   Magnesium 1.60 - 2.60 mg/dL 2.30   Alkaline Phosphatase 40 - 150 unit/L 365 (H)   PROTEIN TOTAL 5.8 - 7.6 gm/dL 5.5 (L)   Albumin 3.4 - 4.8 gm/dL 2.8  (L)   Albumin/Globulin Ratio 1.1 - 2.0 ratio 1.0 (L)   BILIRUBIN TOTAL <=1.5 mg/dL 1.3   AST 5 - 34 unit/L 38 (H)   ALT 0 - 55 unit/L 57 (H)   Globulin, Total 2.4 - 3.5 gm/dL 2.7   (L): Data is abnormally low  (H): Data is abnormally high      Assessment/Plan  Hospital   Closed comminuted intertrochanteric fracture of left femur     Non-Hospital   Osteoporosis   Cardiac arrhythmia   Other pulmonary embolism without acute cor pulmonale   Chronic deep vein thrombosis (DVT) of distal vein of lower extremity   Low vitamin D level       Wounds: Left hip incisions x 4-staples, dressings-drainage saturated dressing   S/p left intertrochanteric femur fracture intramedullary nailing on 11/1 with Dr. Friend  Precautions: WBAT LLE  Bracing/AD: RW  Swallowing: Regular Diet  Function: Tolerating therapy. Continue PT/OT  VTE Prophylaxis: SCDs. Eliquis on Hold. Receiving Blood.   Code Status: FULL CODE   Discharge:Lives alone in the [place] in a single-story home with 4-5 steps and bilateral rails to enter the residence. She completed high school. No  history. She is a retired . Patient is . She lives alone and was completely independent. Pts son lives behind her. He states that they have good family support and will be able to assist her after rehab.    Children (4). Date 11/17 Thursday.

## 2022-11-14 NOTE — PROGRESS NOTES
[facility] ([facility])  Rehab Progress Note    Patient Name: Niecy Joshua  MRN: [id number]  Age: 87 y.o. Sex: female  : 1935  Hospital Length of Stay: 11 days  Date of Service: 2022   Chief Complaint: Left hip fracture s/p IMN on 2022    Subjective:     Basic Information  Admit Information: 87-year-old white female presented to [facility] ED on 10/31/2022 complaining of left hip pain after falling from ground level.  PMH significant for unprovoked DVT diagnosed in  and osteoporosis.  Work up significant for hip fracture.  Tolerated left IMN on  without perioperative complications.  Orthopedic surgery recommended WBAT to RLE.  H&H continues to trend downward with hemoglobin 7.1 requiring 1 unit PRBC transfusion.  Tolerated transfer to [facility] inpatient rehab unit on  without incident.  Today's Information: No acute events overnight.  Sitting up in chair.  Reports good sleep and appetite.  Last BM .  BP moderately controlled.  Cbc and CMP unremarkable.  Remains with left lower extremity swelling-stable.  No imaging today.    Review of patient's allergies indicates:  No Known Allergies     Current Facility-Administered Medications:     0.9%  NaCl infusion (for blood administration), , Intravenous, Q24H PRN, Luis Felipe A Julius, FNP    acetaminophen tablet 650 mg, 650 mg, Oral, Q4H PRN, Luis Felipe A Julius, FNP    acetaminophen tablet 650 mg, 650 mg, Oral, TID, Dilcia Ragland, FNP, 650 mg at 22 0534    ALPRAZolam tablet 0.25 mg, 0.25 mg, Oral, TID PRN, Luis Felipe A Julius, FNP    apixaban tablet 5 mg, 5 mg, Oral, BID, Luis Felipe A Julius, FNP, 5 mg at 22 0826    benzonatate capsule 100 mg, 100 mg, Oral, TID PRN, Luis Felipe A Julius, FNP    bisacodyL suppository 10 mg, 10 mg, Rectal, Daily PRN, Luis Felipe A Julius, FNP    docusate sodium capsule 100 mg, 100 mg, Oral, BID, Luis Felipe A Julius, FNP, 100 mg at 22 0811    furosemide tablet 60  mg, 60 mg, Oral, Daily, Luis Felipe A Julius, FNP, 60 mg at 11/14/22 0827    hydrALAZINE injection 10 mg, 10 mg, Intravenous, Q4H PRN, Luis Felipe A Julius, FNP    hydrOXYzine pamoate capsule 50 mg, 50 mg, Oral, Nightly PRN, Luis Felipe A Julius, FNP    labetalol 20 mg/4 mL (5 mg/mL) IV syring, 10 mg, Intravenous, Q4H PRN, Luis Felipe A Julius, FNP    methocarbamoL tablet 750 mg, 750 mg, Oral, QID, Luis Felipe A Julius, FNP, 750 mg at 11/14/22 0534    metoprolol injection 10 mg, 10 mg, Intravenous, Q2H PRN, Luis Felipe A Julius, FNP    naloxegoL (MOVANTIK) tablet 25 mg, 25 mg, Oral, Daily, Luis Felipe A Julius, FNP, 25 mg at 11/14/22 0827    nitroGLYCERIN SL tablet 0.4 mg, 0.4 mg, Sublingual, Q5 Min PRN, Luis Felipe A Julius, FNP    ondansetron disintegrating tablet 4 mg, 4 mg, Oral, Q6H PRN, Luis Felipe A Julius, FNP    oxyCODONE immediate release tablet 5 mg, 5 mg, Oral, Q4H PRN, Luis Felipe A Julius, FNP, 5 mg at 11/14/22 0827    pantoprazole EC tablet 40 mg, 40 mg, Oral, Daily, Luis Felipe A Julius, FNP, 40 mg at 11/14/22 0827    polyethylene glycol packet 17 g, 17 g, Oral, BID PRN, Luis Felipe A Julius, FNP, 17 g at 11/08/22 0829    potassium chloride SA CR tablet 20 mEq, 20 mEq, Oral, Daily, Luis Felipe A Julius, FNP, 20 mEq at 11/14/22 0827    promethazine tablet 25 mg, 25 mg, Oral, Q6H PRN, Luis Felipe A Julius, FNP     Review of Systems   Complete 12-point review of symptoms negative except for what's mentioned in HPI     Objective:     /69   Pulse 86   Temp 98.2 °F (36.8 °C)   Resp 20   Wt 77.2 kg (170 lb 3.1 oz)   SpO2 95%   BMI 26.66 kg/m²      Physical Exam  Constitutional:       Appearance: Normal appearance.   HENT:      Head: Normocephalic.      Mouth/Throat:      Mouth: Mucous membranes are moist.   Eyes:      Pupils: Pupils are equal, round, and reactive to light.   Cardiovascular:      Rate and Rhythm: Normal rate and regular rhythm.      Heart sounds: Normal heart sounds.      Comments: Regular  rate with intermittent pauses  Pulmonary:      Effort: Pulmonary effort is normal.      Breath sounds: Normal breath sounds.   Abdominal:      General: Bowel sounds are normal.      Palpations: Abdomen is soft.   Genitourinary:     Comments: Indwelling catheter  Musculoskeletal:      Cervical back: Neck supple.      Right lower leg: No edema.      Left lower leg: Edema present.      Comments: Generalized weakness and muscle atrophy.  Left hip dressing clean and intact-moderate swelling appreciated.    Skin:     General: Skin is warm and dry.   Neurological:      General: No focal deficit present.      Mental Status: She is alert and oriented to person, place, and time.   Psychiatric:         Mood and Affect: Mood normal.         Behavior: Behavior normal.         Thought Content: Thought content normal.         Judgment: Judgment normal.   *MD performed and documented physical examination       Lines/Drains/Airways       Drain  Duration                  Urethral Catheter 11/10/22 0640 4 days                Labs  Recent Results (from the past 24 hour(s))   Phosphorus    Collection Time: 11/14/22  5:19 AM   Result Value Ref Range    Phosphorus Level 2.7 2.3 - 4.7 mg/dL   Comprehensive Metabolic Panel    Collection Time: 11/14/22  5:19 AM   Result Value Ref Range    Sodium Level 141 136 - 145 mmol/L    Potassium Level 4.3 3.5 - 5.1 mmol/L    Chloride 109 (H) 98 - 107 mmol/L    Carbon Dioxide 26 23 - 31 mmol/L    Glucose Level 121 (H) 82 - 115 mg/dL    Blood Urea Nitrogen 30.1 (H) 9.8 - 20.1 mg/dL    Creatinine 0.71 0.55 - 1.02 mg/dL    Calcium Level Total 8.4 8.4 - 10.2 mg/dL    Protein Total 5.5 (L) 5.8 - 7.6 gm/dL    Albumin Level 2.8 (L) 3.4 - 4.8 gm/dL    Globulin 2.7 2.4 - 3.5 gm/dL    Albumin/Globulin Ratio 1.0 (L) 1.1 - 2.0 ratio    Bilirubin Total 1.3 <=1.5 mg/dL    Alkaline Phosphatase 365 (H) 40 - 150 unit/L    Alanine Aminotransferase 57 (H) 0 - 55 unit/L    Aspartate Aminotransferase 38 (H) 5 - 34 unit/L     eGFR >60 mls/min/1.73/m2   Magnesium    Collection Time: 11/14/22  5:19 AM   Result Value Ref Range    Magnesium Level 2.30 1.60 - 2.60 mg/dL   CBC with Differential    Collection Time: 11/14/22  5:19 AM   Result Value Ref Range    WBC 7.9 4.5 - 11.5 x10(3)/mcL    RBC 3.48 (L) 4.20 - 5.40 x10(6)/mcL    Hgb 10.2 (L) 12.0 - 16.0 gm/dL    Hct 31.2 (L) 37.0 - 47.0 %    MCV 89.7 80.0 - 94.0 fL    MCH 29.3 27.0 - 31.0 pg    MCHC 32.7 (L) 33.0 - 36.0 mg/dL    RDW 19.8 (H) 11.5 - 17.0 %    Platelet 317 130 - 400 x10(3)/mcL    MPV 9.9 7.4 - 10.4 fL    Neut % 58.9 %    Lymph % 24.2 %    Mono % 12.7 %    Eos % 2.9 %    Basophil % 0.4 %    Lymph # 1.90 0.6 - 4.6 x10(3)/mcL    Neut # 4.6 2.1 - 9.2 x10(3)/mcL    Mono # 1.00 0.1 - 1.3 x10(3)/mcL    Eos # 0.23 0 - 0.9 x10(3)/mcL    Baso # 0.03 0 - 0.2 x10(3)/mcL    IG# 0.07 (H) 0 - 0.04 x10(3)/mcL    IG% 0.9 %    NRBC% 0.0 %       Radiology  Left hip XR on 11/01/2022 at 9:34 a.m., IMPRESSION:Improved alignment following internal fixation of the left femur.    Assessment/Plan:     87 y.o. WF admitted on 11/3/2022    Left hip fracture   - s/p IMN on 11/01/2022  - WBAT to RLE  - staples to remove on 11/14/2022  - continue   K-Dur 20 mEq daily (initiated 11/11)  Lasix 60 mg daily oral (initiated 11/9)  Oxycodone 5 mg q.4 hours p.r.n.   Robaxin 750 mg q.i.d.   Tylenol 650 mg t.i.d.  - defer to physiatry for rehab and pain management  - PT/OT/RT/ST following     Unprovoked left lower extremity DVT  - Diagnosed in 2020  - continue  Eliquis 5 mg b.i.d.(resumed 11/7)     Normocytic anemia  - asymptomatic  - s/p transfusion  x2 units PRBC on 11/4/2022  x1 units PRBC on 11/3/2022  - H/H trending up  - no evidence of active bleeds  - will closely monitor and transfuse if needed      GERD  - Avoid spicy foods, and nothing to eat or drink within x2 hours of bedtime or laying flat (water is ok)   - Avoid NSAIDs (Advil, ibuprofen, naproxen...) and shankar-2 inhibitors (Mobic, Celebrex)    -  continue  Protonix 40 mg daily      Vitamin D deficiency  - vitamin-D level 32.0 on 11/05  - continue  Vitamin-D 5000 units daily      Constipation  - last BM 11/13  - continue  Colace 100 mg BID   Miralax 17 gm BID PRN     Urinary retention   - current   - Indwelling catheter reinserted 11/2, discontinued indwelling catheter 11/9, reinserted on 11/10  - plan to re-attempt to discontinue on 11/15     VTE Prophylaxis:  Eliquis 5 mg b.i.d.-resumed 11/7    COVID-19 testing:  Negative on 11/03/2022  COVID-19 vaccination status:  Vaccinated (Moderna):  07/23/2021 and 08/20/2021, and received 2 boosters     POA: Yes  Living will: Yes  Contacts: Lukas Joshua (son) [phone]                     Marya Gaytan (daughter) [phone]     CODE STATUS: Full Code  Internal Medicine (attending): Cb Fall MD  Physiatry (consulting):  Toño Polk MD     OUTPATIENT PROVIDERS  PCP:  King Duvall MD  Orthopedic surgery:  Florentino Friend MD     DISPOSITION: Condition stable.  Sleep hygiene, bowel maintenance, and appetite at goal.   Last BM on 11/13.  BP moderately controlled.  Vitals otherwise stable with no recent recorded fevers.  Lab work stable.  Plan to discontinue indwelling catheter in the morning.  Continue aggressive therapies and nutritional support.  Monitor closely and notify with any acute changes.  Staffing completed today.    Staffing 11/14/2022: Incontinent of bowel.  Indwelling catheter.  Discontinue in am.  RT: Overall set up to independent. Doing well.  Appetite is good. PT: Moderate fall risk.  Inconsistent.  Needs wheelchair for home. Ambulating 70 feet RW CGA.  Limited by pain, fatigue and endurance.  Working on steps today with family training.  OT: Overall supervision to independent with ADLs.  Toileting needs min assistance for thorough cleaning. Projected discharge 11/17.     Brooks Madrid NP conducted independent physical examination and assisted with medical documentation.    Total time  spent on this encounter including chart review and direct MD + NP 1-on-1 patient interaction: 43 minutes   Over 50% of this time was spent in counseling and coordination of care

## 2022-11-14 NOTE — PT/OT/SLP PROGRESS
"Occupational Therapy Inpatient Rehab Treatment    Name: Niecy Josuha  MRN: [id number]    Assessment:  Niecy Joshua is a 87 y.o. female admitted with a medical diagnosis of Closed comminuted intertrochanteric fracture of left femur with routine healing.  She presents with the following impairments/functional limitations:  weakness, impaired endurance, impaired sensation, impaired self care skills, impaired functional mobility, impaired balance, gait instability, decreased upper extremity function, decreased lower extremity function, decreased safety awareness, decreased coordination.    General Precautions: Standard, fall     Orthopedic Precautions:LLE weight bearing as tolerated     Braces: N/A    Rehab Prognosis: Good; patient would benefit from acute skilled OT services to address these deficits and reach maximum level of function.      History:     Past Medical History:   Diagnosis Date    Cardiac arrhythmia     Low vitamin D level     Osteoporosis     Other pulmonary embolism without acute cor pulmonale        Past Surgical History:   Procedure Laterality Date    BREAST MASS EXCISION      x3  benign    LAPAROSCOPIC REPAIR OF INGUINAL HERNIA Bilateral        Subjective     Orientation: Oriented x4    Chief Complaint: minimal pain    Patient/Family Comments/goals: "I had a good weekend"    Respiratory Status: Room air    Patients cultural, spiritual, Zoroastrian conflicts given the current situation: no       Objective:     Patient found up in chair with see catheter  upon OT entry to room.    Mobility   Patient completed:  Sit to Stand Transfer with independence with rolling walker  Stand to Sit Transfer with independence with rolling walker    Functional Mobility  Standing activity with RT/OT co-tx of bean bag toss.    Limiting Factors for ADLs: motor, endurance, limited ROM, balance, weakness, and safety awareness     Therapeutic Activities  Pt tolerated static/dynamic standing for 10 mins X 2 trials " with independence and no LOB.    Patient left up in chair with all lines intact and call button in reach.     Education provided: Roles and goals of OT, transfer training, safety precautions, fall prevention, and home safety    Multidisciplinary Problems       Occupational Therapy Goals          Problem: Occupational Therapy    Goal Priority Disciplines Outcome Interventions   Occupational Therapy Goal     OT, PT/OT Ongoing, Progressing    Description: ADLs:  Pt to perform grooming tasks with set up and standing at the sink.  Pt to perform UB dressing with set up   Pt to perform LB dressing with Partial/mod assist with AE as needed.  Pt to perform putting on/off footwear task with Max assist with AE as needed  Pt to perform toileting with partial/mod assist    Functional Transfers:  Pt to perform toilet transfers with partial/mod assist and RW  Pt to perform a tub transfer with partial/mod assist and RW/TTB  Pt to perform a walk-in shower transfer with partial/mod assist and RW    IADLs:  Pt to perform simple meal prep with partial/mod assist with RW     Balance, Strengthening, Endurance, Balance:  Pt to consistently demonstrate adherence to orthopedic precautions during all ADL's as instructed by OT.  Pt to demonstrate fair dynamic standing balance as required to perform ADL's from standing level.  Pt to demonstrate good BUE strength during functional task   Pt to demonstrate consistent adherence to breathing control and energy conservation techniques as educated by OT.                        Time Tracking     OT Received On: 11/14/22  Time In 0900     Time Out 0930  Total Time 30 min  Therapy Time: OT Individual: 30  Missed Time:    Missed Time Reason:      Billable Minutes: Therapeutic Activity 30    11/14/2022

## 2022-11-14 NOTE — PT/OT/SLP PROGRESS
Recreational Therapy Treatment        Date of Treatment: 11/14/22  Start Time: 0900  Stop Time: 0930  Total Time: 30 min  Missed Time:    General Precautions: fall  Ortho Precautions: LLE weight bearing as tolerated  Braces: N/A    Vitals   Vitals at Rest  BP    HR    O2 Sat    Pain 3/10     Vitals With Activity  BP    HR    O2 Sat    Pain 3/10       Treatment     Cognitive Skills Building   Cognitive Observation Activity Assist Position Equipment Response            Comment:          Dynamic Activities   Activity Assist Position Equipment Response   Activity 1 Bean bag toos independence Standing Rolling walker and Bean bags good   Comment: Sit to stand was I as was dynamic standing balance/reaching. Standing tolerance was 10 minutes. I with UE coordination and problem solving skills. Playful with staff       Fine Motor Activities   Activity Assist Position Equipment Response           Comment:          Additional Info: Pt progress, plan of care and discharge plans were discussed during staffing at 0930    Goals     Short Term Goals    Goal  Goal Status   Will increase sit to stand to supervision Met   Will improve dynamic standing balance/reaching to supervision Met                 Long Term Goals    Goal Goal Status   Will increase standing tolerance to 5 mintues Met   Will improve dynamic standing balance/reaching to setup Met

## 2022-11-14 NOTE — PT/OT/SLP PROGRESS
Physical Therapy Inpatient Rehab Treatment    Patient Name:  Niecy Joshua   MRN:  [id number]    Recommendations:     Discharge Recommendations:      Discharge Equipment Recommendations: walker, rolling   Barriers to discharge: Decreased caregiver support    Assessment:     Niecy Joshua is a 87 y.o. female admitted with a medical diagnosis of Closed comminuted intertrochanteric fracture of left femur with routine healing.  She presents with the following impairments/functional limitations:  weakness, impaired endurance, impaired self care skills, impaired functional mobility, impaired balance, gait instability .    Rehab Diagnosis: L femur fx s/p IM nail    Recent Surgery: * No surgery found *      General Precautions: Standard, fall     Orthopedic Precautions:LLE weight bearing as tolerated     Braces: N/A    Rehab Prognosis: Good; patient would benefit from acute skilled PT services to address these deficits and reach maximum level of function.      History:     Past Medical History:   Diagnosis Date    Cardiac arrhythmia     Low vitamin D level     Osteoporosis     Other pulmonary embolism without acute cor pulmonale        Past Surgical History:   Procedure Laterality Date    BREAST MASS EXCISION      x3  benign    LAPAROSCOPIC REPAIR OF INGUINAL HERNIA Bilateral        Subjective     Chief Complaint: none     Respiratory Status: Room air    Patients cultural, spiritual, Caodaism conflicts given the current situation: no      Objective:     Communicated with nursing  prior to session.  Patient found up in chair with Other (comments) (in wc)  upon PT entry to room.    Pt is Oriented x3 and Alert, Cooperative, and Motivated.    Functional Mobility:   Bed Mobility:     Supine to Sit: supervision use of leg   Sit to Supine: supervision use of leg    Transfers:     Sit to Stand: Independent with rolling walker  Car Transfer: supervision  with  rolling walker and leg    using  Step Transfer  "and car seat at 29" and 24"   4 stairs with left handrail with touching a with increased time per pts home environment      Current   Status  Discharge   Goal   Functional Area: Care Score:    Roll Left and Right   Independent   Sit to Lying 4 Independent   Lying to Sitting on Side of Bed 4 Independent   Sit to Stand 6 Independent   Chair/Bed-to-Chair Transfer   Independent   Car Transfer 4 Supervision or touching assistance   Walk 10 Feet   Independent   Walk 50 Feet with Two Turns   Supervision or touching assistance   Walk 150 Feet   Supervision or touching assistance   Walk 10 Feet Uneven Surface   Supervision or touching assistance   1 Step (Curb) 4 Supervision or touching assistance   4 Steps 4 Supervision or touching assistance   12 Steps 88 Partial/moderate assistance   Picking Up Object   Independent   Wheel 50 Feet with Two Turns   Independent   Wheel 150 Feet   Independent       Activity Tolerance: Excellent    Patient left up in chair with call button in reach and pt jamie tx well pt stated wants to use car height of 24" easier to get into spoke with PT .    Education provided: transfer training, bed mob, stair training, and assistive device    Expected compliance: High compliance    GOALS:   Multidisciplinary Problems       Physical Therapy Goals          Problem: Physical Therapy    Goal Priority Disciplines Outcome Goal Variances Interventions   Physical Therapy Goal     PT, PT/OT Ongoing, Progressing     Description: Bed Mobility:  Roll left and right with supervision/touching assist. (MET)  Sit to supine transfer with supervision/touching assist. (MET)  Supine to sit transfer with supervision/touching assist. (MET)  Roll left and right independently  Sit to supine transfer with setup/clean-up assist  Supine to sit transfer with setup/clean-up assist      Transfers:  Sit to stand transfer with supervision/touching assist using RW. (MET)  Bed to chair transfer with supervision/touching assist using RW. " (MET)  Car transfer with partial/moderate assist using RW. (MET)   an object from the ground in standing position with supervision/touching assist using RW and reacher. (MET)  Sit to stand transfer independently using RW  Bed to chair transfer with setup/clean-up assist using RW  Car transfer with setup/clean-up assist using RW   an object from the ground in standing position independently using RW        Mobility:  Ambulate 50 feet with supervision/touching assist using RW. (MET)  Ambulate 15 feet on uneven surfaces/ramps with partial/moderate assist using RW. (MET)  Ascend/descend a 4 inch curb with partial/moderate assist using RW.   Ascend/descend 5 stairs with partial/moderate assist using left handrail.  Ambulate 15 feet on uneven surfaces/ramps with setup/clean-up assist using RW  Ambulate 100 feet with supervision/touching assist using RW                           Plan:     During this hospitalization, patient to be seen daily (5-7x/week) to address the identified rehab impairments via gait training, therapeutic exercises, therapeutic activities, wheelchair management/training and progress toward the following goals:    Plan of Care Expires:  11/16/22  PT Next Visit Date: 11/16/22  Plan of Care reviewed with: patient    Additional Information:         Time Tracking:     Therapy Time  PT Received On: 11/14/22  PT Start Time: 1030  PT Stop Time: 1100  PT Total Time (min): 30 min   PT Individual: 30  Missed Time:    Time Missed due to:      Billable Minutes: Therapeutic Activity 30min    11/14/2022

## 2022-11-14 NOTE — PLAN OF CARE
Problem: Skin Injury Risk Increased  Goal: Skin Health and Integrity  Outcome: Ongoing, Progressing  Intervention: Optimize Skin Protection  Flowsheets (Taken 11/13/2022 1950)  Pressure Reduction Techniques:   frequent weight shift encouraged   weight shift assistance provided  Pressure Reduction Devices: pressure-redistributing mattress utilized  Head of Bed (HOB) Positioning: HOB at 30-45 degrees     Problem: Fall Injury Risk  Goal: Absence of Fall and Fall-Related Injury  Outcome: Ongoing, Progressing  Intervention: Promote Injury-Free Environment  Flowsheets (Taken 11/13/2022 1950)  Safety Promotion/Fall Prevention:   assistive device/personal item within reach   medications reviewed   nonskid shoes/socks when out of bed   gait belt with ambulation   instructed to call staff for mobility

## 2022-11-14 NOTE — PROGRESS NOTES
11/14/22 0900   Rec Therapy Time Calculation   Date of Treatment 11/14/22   Rec Start Time 0900   Rec Stop Time 0930   Rec Total Time (min) 30 min   Time   Treatment time 2 units   Precautions   Orthopedic Precautions  LLE weight bearing as tolerated   Pain/Comfort   Pain Rating 1 3/10   Location - Side 1 Left   Location 1 hip   Pain Addressed 1 Reposition   Pain Rating Post-Intervention 1 3/10   OTHER   Rehab identified problem list/impairments weakness;impaired endurance;impaired sensation;impaired self care skills;impaired functional mobility;impaired balance;gait instability;decreased upper extremity function;decreased lower extremity function;decreased safety awareness;decreased coordination   Values/Beliefs/Spiritual Care   Spiritual, Cultural Beliefs, Protestant Practices, Values that Affect Care no   Overall Level of Functioning   Activity Tolerance Independent   Dynamic Sitting Balance/Reaching Does not occur   Dynamic Standing Balance/Reaching Independent   Right UE Coodination/Dexterity Independent   Left UE Coordination/Dexterity Independent   Problem Solving/Sequencing Skills Independent   Memory Recall Independent   R/L Neglect/Inattention Does not occur   Attention Span Independent   Recreational Therapy Short Term Goals   Short Term Goal 1 Progression Met   Short Term Goal 2 Progression Met   Recreational Therapy Long Term Goals   Long Term Goal 1 Progression Met   Long Term Goal 2 Progression Met   Plan   Patient to be seen Daily   Planned Duration Other (Comment)  (3 days)   Treatments Planned Energy conservation training   Treatment plan/goals estblished with Patient/Caregiver Yes

## 2022-11-15 PROCEDURE — 11800000 HC REHAB PRIVATE ROOM

## 2022-11-15 PROCEDURE — 97110 THERAPEUTIC EXERCISES: CPT

## 2022-11-15 PROCEDURE — 99233 SBSQ HOSP IP/OBS HIGH 50: CPT | Mod: ,,, | Performed by: PHYSICAL MEDICINE & REHABILITATION

## 2022-11-15 PROCEDURE — 99233 PR SUBSEQUENT HOSPITAL CARE,LEVL III: ICD-10-PCS | Mod: ,,, | Performed by: PHYSICAL MEDICINE & REHABILITATION

## 2022-11-15 PROCEDURE — 97168 OT RE-EVAL EST PLAN CARE: CPT

## 2022-11-15 PROCEDURE — 25000003 PHARM REV CODE 250: Performed by: NURSE PRACTITIONER

## 2022-11-15 PROCEDURE — 97530 THERAPEUTIC ACTIVITIES: CPT

## 2022-11-15 PROCEDURE — 97535 SELF CARE MNGMENT TRAINING: CPT

## 2022-11-15 PROCEDURE — 94799 UNLISTED PULMONARY SVC/PX: CPT

## 2022-11-15 PROCEDURE — 51702 INSERT TEMP BLADDER CATH: CPT

## 2022-11-15 RX ORDER — POTASSIUM CHLORIDE 750 MG/1
10 TABLET, EXTENDED RELEASE ORAL DAILY
Status: DISCONTINUED | OUTPATIENT
Start: 2022-11-15 | End: 2022-11-17 | Stop reason: HOSPADM

## 2022-11-15 RX ORDER — GABAPENTIN 100 MG/1
200 CAPSULE ORAL 3 TIMES DAILY
Status: DISCONTINUED | OUTPATIENT
Start: 2022-11-15 | End: 2022-11-17 | Stop reason: HOSPADM

## 2022-11-15 RX ORDER — FUROSEMIDE 40 MG/1
40 TABLET ORAL DAILY
Status: DISCONTINUED | OUTPATIENT
Start: 2022-11-15 | End: 2022-11-17 | Stop reason: HOSPADM

## 2022-11-15 RX ADMIN — NALOXEGOL OXALATE 25 MG: 25 TABLET, FILM COATED ORAL at 08:11

## 2022-11-15 RX ADMIN — METHOCARBAMOL 750 MG: 750 TABLET ORAL at 05:11

## 2022-11-15 RX ADMIN — GABAPENTIN 200 MG: 100 CAPSULE ORAL at 05:11

## 2022-11-15 RX ADMIN — PANTOPRAZOLE SODIUM 40 MG: 40 TABLET, DELAYED RELEASE ORAL at 08:11

## 2022-11-15 RX ADMIN — METHOCARBAMOL 750 MG: 750 TABLET ORAL at 12:11

## 2022-11-15 RX ADMIN — POTASSIUM CHLORIDE 10 MEQ: 750 TABLET, EXTENDED RELEASE ORAL at 08:11

## 2022-11-15 RX ADMIN — DOCUSATE SODIUM 100 MG: 100 CAPSULE, LIQUID FILLED ORAL at 10:11

## 2022-11-15 RX ADMIN — DOCUSATE SODIUM 100 MG: 100 CAPSULE, LIQUID FILLED ORAL at 08:11

## 2022-11-15 RX ADMIN — METHOCARBAMOL 750 MG: 750 TABLET ORAL at 10:11

## 2022-11-15 RX ADMIN — FUROSEMIDE 40 MG: 40 TABLET ORAL at 08:11

## 2022-11-15 RX ADMIN — GABAPENTIN 200 MG: 100 CAPSULE ORAL at 10:11

## 2022-11-15 RX ADMIN — APIXABAN 5 MG: 5 TABLET, FILM COATED ORAL at 10:11

## 2022-11-15 RX ADMIN — GABAPENTIN 200 MG: 100 CAPSULE ORAL at 02:11

## 2022-11-15 RX ADMIN — APIXABAN 5 MG: 5 TABLET, FILM COATED ORAL at 08:11

## 2022-11-15 NOTE — PT/OT/SLP PROGRESS
"Occupational Therapy Inpatient Rehab Treatment    Name: Niecy Joshua  MRN: [id number]    Assessment:  Niecy Joshua is a 87 y.o. female admitted with a medical diagnosis of Closed comminuted intertrochanteric fracture of left femur with routine healing.  She presents with the following impairments/functional limitations:  weakness, impaired endurance, impaired self care skills, impaired functional mobility, gait instability, impaired balance, decreased upper extremity function, decreased lower extremity function, decreased safety awareness, impaired sensation, pain, edema.    General Precautions: Standard, fall     Orthopedic Precautions:LLE weight bearing as tolerated     Braces: N/A    Rehab Prognosis: Good; patient would benefit from acute skilled OT services to address these deficits and reach maximum level of function.      History:     Past Medical History:   Diagnosis Date    Cardiac arrhythmia     Low vitamin D level     Osteoporosis     Other pulmonary embolism without acute cor pulmonale        Past Surgical History:   Procedure Laterality Date    BREAST MASS EXCISION      x3  benign    INTRAMEDULLARY RODDING OF FEMUR Left 11/1/2022    Procedure: INSERTION, INTRAMEDULLARY AMA, FEMUR;  Surgeon: Florentino Friend DO;  Location: [facility];  Service: Orthopedics;  Laterality: Left;  LEFT    LAPAROSCOPIC REPAIR OF INGUINAL HERNIA Bilateral        Subjective     Orientation: Oriented x4    Chief Complaint: unable to urinate    Patient/Family Comments/goals: "to keep the see out"    Respiratory Status: Room air    Patients cultural, spiritual, Buddhist conflicts given the current situation: no       Objective:     Patient found up in chair upon OT entry to room.    Mobility   Patient completed:  Sit to Supine with modified independence with leg   Sit to Stand Transfer with independence with rolling walker  Stand to Sit Transfer with independence with rolling walker  Bed to Chair Transfer using Stand " Pivot technique with independence with rolling walker    Functional Mobility  FM with RW room 410 to OT gym ~70' before needing a seated break - stated she was tired.  W/C mobility - propelled self back to room 120' independently.    ADLs   Current Status   Eating 6   Oral Hygiene 6   Shower, Bathe Self 6   Upper Body Dressing 6   Lower Body Dressing 4   Toileting Hygiene 6   Toilet Transfer 5   Putting On, Taking Off Footwear 6     Limiting Factors for ADLs: motor, endurance, limited ROM, balance, weakness, coordination, cognition, and safety awareness     Therapeutic Exercise  Performed BUE there ex with UBE for 6 mins forward and 6 mins backward and performed 3# dowel exe 2 sets of 15 reps all planes all while seated in W/C with rest breaks between.    Patient left up in chair with all lines intact and call button in reach.     Education provided: Roles and goals of OT, ADLs, transfer training, wheelchair precautions, sequencing, safety precautions, fall prevention, equipment recommendations, and home safety    Multidisciplinary Problems       Occupational Therapy Goals          Problem: Occupational Therapy    Goal Priority Disciplines Outcome Interventions   Occupational Therapy Goal     OT, PT/OT Ongoing, Progressing    Description: ADLs:  Pt to perform grooming tasks with set up and standing at the sink. MET  Pt to perform UB dressing with set up MET  Pt to perform LB dressing with Partial/mod assist with AE as needed. MET  Pt to perform putting on/off footwear task with Max assist with AE as needed. MET  Pt to perform toileting with partial/mod assist. MET    Functional Transfers:  Pt to perform toilet transfers with partial/mod assist and RW. MET  Pt to perform a tub transfer with partial/mod assist and RW/TTB. MET   Pt to perform a walk-in shower transfer with partial/mod assist and RW. MET    IADLs:  Pt to perform simple meal prep with partial/mod assist with RW     Balance, Strengthening, Endurance,  Balance:  Pt to consistently demonstrate adherence to orthopedic precautions during all ADL's as instructed by OT. MET  Pt to demonstrate fair dynamic standing balance as required to perform ADL's from standing level. MET  Pt to demonstrate good BUE strength during functional task   Pt to demonstrate consistent adherence to breathing control and energy conservation techniques as educated by OT. MET                       Time Tracking   (Pt seen twice in PM - 4499-8723 & 3940-3116)    OT Received On: 11/15/22  Time In  (1300, 1500)     Time Out  (1330, 1530)  Total Time 60 min  Therapy Time: OT Individual: 60  Missed Time:    Missed Time Reason:      Billable Minutes: Therapeutic Activity 15 and Therapeutic Exercise 45    11/15/2022

## 2022-11-15 NOTE — PLAN OF CARE
Spoke with son Lukas re: BSC not being covered.  He told me to cancel it, since they have 1 that they can move between the two houses.  Cancelled with Jerry at [facility].      Lukas will be here for pickup Thursday between 1030 and 1100.

## 2022-11-15 NOTE — PROGRESS NOTES
[facility] ([facility])  Rehab Progress Note    Patient Name: Niecy Joshua  MRN: [id number]  Age: 87 y.o. Sex: female  : 1935  Hospital Length of Stay: 12 days  Date of Service: 11/15/2022   Chief Complaint: Left hip fracture s/p IMN on 2022    Subjective:     Basic Information  Admit Information: 87-year-old white female presented to [facility] ED on 10/31/2022 complaining of left hip pain after falling from ground level.  PMH significant for unprovoked DVT diagnosed in  and osteoporosis.  Work up significant for hip fracture.  Tolerated left IMN on  without perioperative complications.  Orthopedic surgery recommended WBAT to RLE.  H&H continues to trend downward with hemoglobin 7.1 requiring 1 unit PRBC transfusion.  Tolerated transfer to [facility] inpatient rehab unit on  without incident.  Today's Information: No acute events overnight.  Sitting up in chair.  Indwelling catheter discontinued early this morning.  Appetite is good.  Last BM .  Vital signs at goal with no recorded fevers.  No labs or imaging today.    Review of patient's allergies indicates:  No Known Allergies     Current Facility-Administered Medications:     0.9%  NaCl infusion (for blood administration), , Intravenous, Q24H PRN, Luis Felipe A Julius, FNP    acetaminophen tablet 650 mg, 650 mg, Oral, Q4H PRN, Luis Felipe A Julius, FNP    acetaminophen tablet 650 mg, 650 mg, Oral, TID, Dilcia Ragland, FNP, 650 mg at 22    ALPRAZolam tablet 0.25 mg, 0.25 mg, Oral, TID PRN, Luis Felipe A Julius, FNP    apixaban tablet 5 mg, 5 mg, Oral, BID, Luis Felipe A Julius, FNP, 5 mg at 22    benzonatate capsule 100 mg, 100 mg, Oral, TID PRN, Luis Felipe A Julius, FNP    bisacodyL suppository 10 mg, 10 mg, Rectal, Daily PRN, Luis Felipe A Julius, FNP    docusate sodium capsule 100 mg, 100 mg, Oral, BID, Luis Felipe A Julius, FNP, 100 mg at 22    furosemide tablet 60 mg, 60 mg,  Oral, Daily, Luis Felipe A Julius, FNP, 60 mg at 11/14/22 0827    hydrALAZINE injection 10 mg, 10 mg, Intravenous, Q4H PRN, Luis Felipe A Julius, FNP    hydrOXYzine pamoate capsule 50 mg, 50 mg, Oral, Nightly PRN, Luis Felipe A Julius, FNP    labetalol 20 mg/4 mL (5 mg/mL) IV syring, 10 mg, Intravenous, Q4H PRN, Luis Felipe A Julius, FNP    methocarbamoL tablet 750 mg, 750 mg, Oral, QID, Luis Felipe A Julius, FNP, 750 mg at 11/14/22 2300    metoprolol injection 10 mg, 10 mg, Intravenous, Q2H PRN, Luis Felipe A Julius, FNP    naloxegoL (MOVANTIK) tablet 25 mg, 25 mg, Oral, Daily, Luis Felipe A Julius, FNP, 25 mg at 11/14/22 0827    nitroGLYCERIN SL tablet 0.4 mg, 0.4 mg, Sublingual, Q5 Min PRN, Luis Felipe A Julius, FNP    ondansetron disintegrating tablet 4 mg, 4 mg, Oral, Q6H PRN, Luis Felipe A Julius, FNP    oxyCODONE immediate release tablet 5 mg, 5 mg, Oral, Q4H PRN, Luis Felipe A Jluius, FNP, 5 mg at 11/14/22 1254    pantoprazole EC tablet 40 mg, 40 mg, Oral, Daily, Luis Felipe A Julius, FNP, 40 mg at 11/14/22 0827    polyethylene glycol packet 17 g, 17 g, Oral, BID PRN, Luis Felipe A Julius, FNP, 17 g at 11/08/22 0829    potassium chloride SA CR tablet 20 mEq, 20 mEq, Oral, Daily, Luis Felipe A Julius, FNP, 20 mEq at 11/14/22 0827    promethazine tablet 25 mg, 25 mg, Oral, Q6H PRN, Luis Felipe A Julius, FNP     Review of Systems   Complete 12-point review of symptoms negative except for what's mentioned in HPI     Objective:     /64   Pulse 71   Temp 98.1 °F (36.7 °C) (Oral)   Resp 18   Wt 77.2 kg (170 lb 3.1 oz)   SpO2 100%   BMI 26.66 kg/m²      Physical Exam  Constitutional:       Appearance: Normal appearance.   HENT:      Head: Normocephalic.      Mouth/Throat:      Mouth: Mucous membranes are moist.   Eyes:      Pupils: Pupils are equal, round, and reactive to light.   Cardiovascular:      Rate and Rhythm: Normal rate and regular rhythm.      Heart sounds: Normal heart sounds.      Comments: Regular rate  with intermittent pauses.  LLE dependent edema  Pulmonary:      Effort: Pulmonary effort is normal.      Breath sounds: Normal breath sounds.   Abdominal:      General: Bowel sounds are normal.      Palpations: Abdomen is soft.   Musculoskeletal:      Cervical back: Neck supple.      Comments: Generalized weakness and muscle atrophy.  Left hip dressing clean and intact-moderate swelling appreciated.    Skin:     General: Skin is warm and dry.   Neurological:      General: No focal deficit present.      Mental Status: She is alert and oriented to person, place, and time.   Psychiatric:         Mood and Affect: Mood normal.         Behavior: Behavior normal.         Thought Content: Thought content normal.         Judgment: Judgment normal.   *MD performed and documented physical examination       Lines/Drains/Airways       Drain  Duration                  Urethral Catheter 11/10/22 0640 4 days                Labs  Recent Results (from the past 24 hour(s))   Phosphorus    Collection Time: 11/14/22  5:19 AM   Result Value Ref Range    Phosphorus Level 2.7 2.3 - 4.7 mg/dL   Comprehensive Metabolic Panel    Collection Time: 11/14/22  5:19 AM   Result Value Ref Range    Sodium Level 141 136 - 145 mmol/L    Potassium Level 4.3 3.5 - 5.1 mmol/L    Chloride 109 (H) 98 - 107 mmol/L    Carbon Dioxide 26 23 - 31 mmol/L    Glucose Level 121 (H) 82 - 115 mg/dL    Blood Urea Nitrogen 30.1 (H) 9.8 - 20.1 mg/dL    Creatinine 0.71 0.55 - 1.02 mg/dL    Calcium Level Total 8.4 8.4 - 10.2 mg/dL    Protein Total 5.5 (L) 5.8 - 7.6 gm/dL    Albumin Level 2.8 (L) 3.4 - 4.8 gm/dL    Globulin 2.7 2.4 - 3.5 gm/dL    Albumin/Globulin Ratio 1.0 (L) 1.1 - 2.0 ratio    Bilirubin Total 1.3 <=1.5 mg/dL    Alkaline Phosphatase 365 (H) 40 - 150 unit/L    Alanine Aminotransferase 57 (H) 0 - 55 unit/L    Aspartate Aminotransferase 38 (H) 5 - 34 unit/L    eGFR >60 mls/min/1.73/m2   Magnesium    Collection Time: 11/14/22  5:19 AM   Result Value Ref Range     Magnesium Level 2.30 1.60 - 2.60 mg/dL   CBC with Differential    Collection Time: 11/14/22  5:19 AM   Result Value Ref Range    WBC 7.9 4.5 - 11.5 x10(3)/mcL    RBC 3.48 (L) 4.20 - 5.40 x10(6)/mcL    Hgb 10.2 (L) 12.0 - 16.0 gm/dL    Hct 31.2 (L) 37.0 - 47.0 %    MCV 89.7 80.0 - 94.0 fL    MCH 29.3 27.0 - 31.0 pg    MCHC 32.7 (L) 33.0 - 36.0 mg/dL    RDW 19.8 (H) 11.5 - 17.0 %    Platelet 317 130 - 400 x10(3)/mcL    MPV 9.9 7.4 - 10.4 fL    Neut % 58.9 %    Lymph % 24.2 %    Mono % 12.7 %    Eos % 2.9 %    Basophil % 0.4 %    Lymph # 1.90 0.6 - 4.6 x10(3)/mcL    Neut # 4.6 2.1 - 9.2 x10(3)/mcL    Mono # 1.00 0.1 - 1.3 x10(3)/mcL    Eos # 0.23 0 - 0.9 x10(3)/mcL    Baso # 0.03 0 - 0.2 x10(3)/mcL    IG# 0.07 (H) 0 - 0.04 x10(3)/mcL    IG% 0.9 %    NRBC% 0.0 %       Radiology  Left hip XR on 11/01/2022 at 9:34 a.m., IMPRESSION:Improved alignment following internal fixation of the left femur.    Assessment/Plan:     87 y.o. WF admitted on 11/3/2022    Left hip fracture   - s/p IMN on 11/01/2022  - WBAT to RLE  - staples to remove on 11/14/2022  - discontinued Tylenol 650 mg t.i.d. on 11/15  - initiate   Gabapentin 200 mg t.i.d. (initiated 11/15)  - continue   K-Dur 10 mEq daily (initiated 11/11) (decreased 11/15)  Lasix 40 mg daily oral (initiated 11/9) (decreased 11/15)  Oxycodone 5 mg q.4 hours p.r.n.   Robaxin 750 mg q.i.d.   - defer to physiatry for rehab and pain management  - PT/OT/RT/ST following     Unprovoked left lower extremity DVT  - Diagnosed in 2020  - continue  Eliquis 5 mg b.i.d.(resumed 11/7)     Normocytic anemia  - asymptomatic  - s/p transfusion  x2 units PRBC on 11/4/2022  x1 units PRBC on 11/3/2022  - H/H trending up  - no evidence of active bleeds  - will closely monitor and transfuse if needed      GERD  - Avoid spicy foods, and nothing to eat or drink within x2 hours of bedtime or laying flat (water is ok)   - Avoid NSAIDs (Advil, ibuprofen, naproxen...) and shankar-2 inhibitors (Mobic,  Celebrex)    - continue  Protonix 40 mg daily      Vitamin D deficiency  - vitamin-D level 32.0 on 11/05  - continue  Vitamin-D 5000 units daily      Constipation  - last BM 11/14  - continue  Colace 100 mg BID   Miralax 17 gm BID PRN     Urinary retention   - current   - Indwelling catheter reinserted 11/2, discontinued indwelling catheter 11/9, reinserted on 11/10  - discontinue indwelling catheter on 11/15     VTE Prophylaxis:  Eliquis 5 mg b.i.d.-resumed 11/7    COVID-19 testing:  Negative on 11/03/2022  COVID-19 vaccination status:  Vaccinated (Moderna):  07/23/2021 and 08/20/2021, and received 2 boosters     POA: Yes  Living will: Yes  Contacts: Lukas Joshua (son) [phone]                     Marya Gaytan (daughter) [phone]     CODE STATUS: Full Code  Internal Medicine (attending): Cb Fall MD  Physiatry (consulting):  Toño Polk MD     OUTPATIENT PROVIDERS  PCP:  King Duvall MD  Orthopedic surgery:  Florentino Friend MD     DISPOSITION: Condition stable.  Sleep hygiene, bowel maintenance, and appetite at goal.  Last BM 11/14.  Vital signs at goal with no recorded fevers.  No labs or imaging today.  Reviewing lab work yesterday alk-phos trending up.  Liver enzymes trending up slightly.  Discontinue Tylenol and initiate gabapentin 200 mg t.i.d..  Indwelling catheter discontinued.  Bladder scan q.6 hours to monitor for retention.  Decrease Lasix 40 mg daily and potassium 10 mEq daily.  Continue aggressive mobilization as tolerated.  Monitor closely.  Notify of acute changes.    Staffing 11/14/2022: Incontinent of bowel.  Indwelling catheter.  Discontinue in am.  RT: Overall set up to independent. Doing well.  Appetite is good. PT: Moderate fall risk.  Inconsistent.  Needs wheelchair for home. Ambulating 70 feet RW CGA.  Limited by pain, fatigue and endurance.  Working on steps today with family training.  OT: Overall supervision to independent with ADLs.  Toileting needs min assistance for  thorough cleaning. Projected discharge 11/17.     Brooks Madrid NP conducted independent physical examination and assisted with medical documentation.    Total time spent on this encounter including chart review and direct MD + NP 1-on-1 patient interaction: 44 minutes   Over 50% of this time was spent in counseling and coordination of care

## 2022-11-15 NOTE — PROGRESS NOTES
Subjective:  HPI: 87 y.o. WF with PMH of DVT, osteoporosis, and vitamin-D deficiency presented to [facility] ED on 10/31/2022 for mechanical fall.  Patient reports she was walking to the bathroom when she fell onto her left hip.  Patient denies hitting head, LOC, chest pain, shortness of breath, abdominal pain, nausea, vomiting, dizziness, syncope, and fever.  Patient reports improvement of left hip pain after pain medications given in ED. Initial vital signs in the ED were /68, pulse 70, respirations 16, temperature 36.8° C, and SpO2 97%.  Labs revealed WBC 14.8, chloride 109, BUN 22.4, and glucose 148.  Chest x-ray revealed no acute pulmonary process.  Left hip x-ray revealed comminuted left intertrochanteric femur fracture.  Orthopedics was consulted with plans for surgery. Patient was admitted to hospital medicine service for further medical management. She underwent left intertrochanteric femur fracture intramedullary nailing on 11/1 with Dr. Friend. Participating with therapy. Functional status includes bed mobility and transfers requiring minimum assistance. Amb w/RW for 5 ft. Patient was evaluated, accepted, and admitted to inpatient rehab to improve functional status. Transferred to [facility] on 11/3 without incident.    11/15: Seen with PT, seated in WC.  Tells me that her Mccarty catheter was removed this morning, and she hasn't yet urinated despite sitting on commode and trying. Encouraged fluid intake. States that OT wore her out, but continuing to participate. VSSAF.      Review of Systems  Depression/Anxiety: no complaints     ALPRAZolam tablet 0.25 mg TID PRN Anxiety  Pain: left hip with movement-controlled     acetaminophen tablet 650 mg TID  methocarbamoL tablet 750 mg TID  acetaminophen tablet 650 mg q4hr PRN mild pain  oxyCODONE immediate release tablet 5 mg q4hr PRN mod pain  Bowels/Bladder: last BM 11/15 x 2 (loose incontinent stools), 11/6 following enema, 11/5 following suppository- not feeling  fully evacuated; 11/3 (loose, incontinent-small amount)   DC Mccarty 11/9-replaced 11/10 2/2 continued retention   Appetite: decreased from baseline-improving    Sleep: good          Physical Exam  General: well-developed, well-nourished, in no acute distress  Respiratory: equal chest rise, no SOB, no audible wheeze  Cardiovascular: regular rate and rhythm, BLE edema  Gastrointestinal: soft, non-tender, non-distended   Musculoskeletal: decreased ROM/strength to LLE  Integumentary: no rashes or skin lesions present, Left hip incisions x 4-staples, dressings-drainage saturated dressing   Neurologic: cranial nerves intact, no signs of peripheral neurological deficit, motor/sensory function intact  *MD performed and documented physical examination        Assessment/Plan  Hospital   Closed comminuted intertrochanteric fracture of left femur     Non-Hospital   Osteoporosis   Cardiac arrhythmia   Other pulmonary embolism without acute cor pulmonale   Chronic deep vein thrombosis (DVT) of distal vein of lower extremity   Low vitamin D level       Wounds: Left hip incisions x 4-staples, dressings-drainage saturated dressing   S/p left intertrochanteric femur fracture intramedullary nailing on 11/1 with Dr. Friend  Precautions: WBAT LLE  Bracing/AD: RW  Swallowing: Regular Diet  Function: Tolerating therapy. Continue PT/OT  VTE Prophylaxis: SCDs. Eliquis on Hold. Receiving Blood.   Code Status: FULL CODE   Discharge:Lives alone in the [place] in a single-story home with 4-5 steps and bilateral rails to enter the residence. She completed high school. No  history. She is a retired . Patient is . She lives alone and was completely independent. Pts son lives behind her. He states that they have good family support and will be able to assist her after rehab.    Children (4). Date 11/17 Thursday.     Dos 11/15/22  Patient seen in PT gym today  Participation and progress toward goals noted  Continues  working on strength, mobility, balance and increasing endurance  Progress and problems discussed with patient and therapists  Questions answered  Chart reviewed and discussed with Dr gonzalez and medicine team as well as Dedra Ragland, my FNP  Continue present management             Dilcia Ragland NP, conducted additional independent physical examination and assisted with medical documentation.

## 2022-11-15 NOTE — PLAN OF CARE
Problem: Fall Injury Risk  Goal: Absence of Fall and Fall-Related Injury  Outcome: Ongoing, Progressing  Intervention: Promote Injury-Free Environment  Flowsheets (Taken 11/15/2022 5881)  Safety Promotion/Fall Prevention:   assistive device/personal item within reach   Fall Risk signage in place   lighting adjusted   nonskid shoes/socks when out of bed   side rails raised x 3   instructed to call staff for mobility

## 2022-11-15 NOTE — PROGRESS NOTES
Sent orders for HH PT/OT/RN to [facility] via [facility].    Sent orders for w/c and BSC to [facility].  Jerry with [facility] will contact marleny Lukas for payment for BSC since not covered by Medicare under new guidelines (patient agreed to pay).

## 2022-11-15 NOTE — PT/OT/SLP RE-EVAL
"Occupational Therapy Inpatient Rehab Re-Evaluation    Name: Niecy Joshua  MRN: [id number]    Recommendations:     Discharge Recommendations:  home with home health   Discharge Equipment Recommendations: walker, rolling, shower chair, bedside commode   Barriers to discharge: None    Assessment:  Niecy Joshua is a 87 y.o. female admitted with a medical diagnosis of Closed comminuted intertrochanteric fracture of left femur with routine healing.  She presents with the following impairments/functional limitations:  weakness, impaired endurance, impaired sensation, impaired self care skills, impaired functional mobility, gait instability, impaired balance, decreased upper extremity function, decreased lower extremity function, decreased safety awareness, pain, edema.    General Precautions: Standard, fall     Orthopedic Precautions:LLE weight bearing as tolerated     Braces: N/A    Rehab Prognosis: Good; patient would benefit from acute skilled OT services to address these deficits and reach maximum level of function.      History:     Past Medical History:   Diagnosis Date    Cardiac arrhythmia     Low vitamin D level     Osteoporosis     Other pulmonary embolism without acute cor pulmonale        Past Surgical History:   Procedure Laterality Date    BREAST MASS EXCISION      x3  benign    INTRAMEDULLARY RODDING OF FEMUR Left 11/1/2022    Procedure: INSERTION, INTRAMEDULLARY AMA, FEMUR;  Surgeon: Florentino Friend DO;  Location: [facility];  Service: Orthopedics;  Laterality: Left;  LEFT    LAPAROSCOPIC REPAIR OF INGUINAL HERNIA Bilateral        Subjective     Orientation: Oriented x4    Chief Complaint: "I still haven't urinated"    Patient/Family Comments/goals: "To keep see out"    Vitals  Vitals at Rest  BP     HR     O2 Sat     Pain Pain Rating 1: 4/10  Location - Side 1: Left  Location 1: hip  Pain Addressed 1: Reposition, Cessation of Activity  Pain Rating Post-Intervention 1: 0/10     Vitals With Activity  BP   "   HR     O2 Sat     Pain Pain Rating 1: 3/10  Location - Side 1: Left  Location 1: hip  Pain Addressed 1: Reposition, Cessation of Activity  Pain Rating Post-Intervention 1: 3/10     Respiratory Status: Room air    Patients cultural, spiritual, Orthodox conflicts given the current situation: no       Living Environment   Living Environment  Lives With: alone  Living Arrangements: house  Home Accessibility: stairs to enter home  Number of Stairs, Main Entrance: five  Stair Railings, Main Entrance: railing on left side (ascending)  Shower Setup: Walk-in shower    Prior Level of Function  BADL: Independent    IADL: Independent    Equipment used at home:  .  DME owned (not currently used): none.      Upon discharge, patient will have assistance from family.    Objective:     Patient found up in chair upon OT entry to room.    Mobility   Patient completed:  Sit to Stand Transfer with independence with rolling walker  Stand to Sit Transfer with independence with rolling walker  Toilet Transfer Stand Pivot technique with modified independence with  rolling walker  Tub Transfer Stand Pivot technique with modified independence with rolling walker    Functional Mobility   In room mobility with RW Mod I for ADLs    ADLs     Current Status   Eating 6   Oral Hygiene 6   Shower, Bathe Self 6   Upper Body Dressing 6   Lower Body Dressing 4   Toileting Hygiene 6   Toilet Transfer 5   Putting On, Taking Off Footwear 6     Limiting Factors for ADLs: motor, sensory, endurance, limited ROM, balance, weakness, coordination, and safety awareness       Exams     ROM:          -       WFL    Hand Dominance: Right    ROM Hand  Left Hand: WFL  Right Hand: WFL    Strength  Overall Strength:          -       WFL    Balance    Sitting  Sitting Surface: TTB  Static: No UE Support, Normal  Dynamic: No UE extremity support, Fair (+)    Standing  Static: Bilateral UE Extremity Support, Good (-)  Dynamic: Bilateral UE extremity support, Fair  (+)    Patient left up in chair with all lines intact and PTSalvador present.    Education provided: Roles and goals of OT, ADLs, transfer training, assistive device, sequencing, safety precautions, fall prevention, post-op precautions, equipment recommendations, and home safety    Multidisciplinary Problems       Occupational Therapy Goals          Problem: Occupational Therapy    Goal Priority Disciplines Outcome Interventions   Occupational Therapy Goal     OT, PT/OT Ongoing, Progressing    Description: ADLs:  Pt to perform grooming tasks with set up and standing at the sink. MET  Pt to perform UB dressing with set up MET  Pt to perform LB dressing with Partial/mod assist with AE as needed. MET  Pt to perform putting on/off footwear task with Max assist with AE as needed. MET  Pt to perform toileting with partial/mod assist. MET    Functional Transfers:  Pt to perform toilet transfers with partial/mod assist and RW. MET  Pt to perform a tub transfer with partial/mod assist and RW/TTB. MET   Pt to perform a walk-in shower transfer with partial/mod assist and RW. MET    IADLs:  Pt to perform simple meal prep with partial/mod assist with RW     Balance, Strengthening, Endurance, Balance:  Pt to consistently demonstrate adherence to orthopedic precautions during all ADL's as instructed by OT. MET  Pt to demonstrate fair dynamic standing balance as required to perform ADL's from standing level. MET  Pt to demonstrate good BUE strength during functional task   Pt to demonstrate consistent adherence to breathing control and energy conservation techniques as educated by OT. MET                       Plan     During this hospitalization, patient to be seen 5 x/week to address the identified rehab impairments via self-care/home management, therapeutic activities, therapeutic exercises and progress toward the following goals:    Plan of Care Expires:  11/21/22    Time Tracking     OT Received On: 11/15/22  Time In 1000      Time Out 1100  Total Time 60 min  Therapy Time: OT Individual: 60  Missed Time:    Missed Time Reason:      Billable Minutes: Re-eval 15 and Self Care/Home Management 45    11/15/2022

## 2022-11-15 NOTE — PT/OT/SLP PROGRESS
Physical Therapy Inpatient Rehab Treatment    Patient Name:  Niecy Joshua   MRN:  [id number]    Recommendations:     Discharge Recommendations:      Discharge Equipment Recommendations: walker, rolling   Barriers to discharge: None    Assessment:     Niecy Joshua is a 87 y.o. female admitted with a medical diagnosis of Closed comminuted intertrochanteric fracture of left femur with routine healing.  She presents with the following impairments/functional limitations:  weakness, impaired endurance, impaired functional mobility, gait instability, impaired balance, decreased safety awareness, pain, decreased ROM .    Rehab Diagnosis: L femur fx s/p IM nail    Recent Surgery: * No surgery found *      General Precautions: Standard, fall     Orthopedic Precautions:LLE weight bearing as tolerated     Braces: N/A    Rehab Prognosis: Good; patient would benefit from acute skilled PT services to address these deficits and reach maximum level of function.      History:     Past Medical History:   Diagnosis Date    Cardiac arrhythmia     Low vitamin D level     Osteoporosis     Other pulmonary embolism without acute cor pulmonale        Past Surgical History:   Procedure Laterality Date    BREAST MASS EXCISION      x3  benign    INTRAMEDULLARY RODDING OF FEMUR Left 11/1/2022    Procedure: INSERTION, INTRAMEDULLARY AMA, FEMUR;  Surgeon: Florentino Friend DO;  Location: [facility];  Service: Orthopedics;  Laterality: Left;  LEFT    LAPAROSCOPIC REPAIR OF INGUINAL HERNIA Bilateral        Subjective     Chief Complaint: Im tired     Respiratory Status: Room air    Patients cultural, spiritual, Moravian conflicts given the current situation: no      Objective:     Communicated with RN prior to session.  Patient found up in chair with    upon PT entry to room.    Pt is Oriented x3 and Alert, Cooperative, and Motivated.    Functional Mobility:      Current   Status   Discharge   Goal   Functional Area: Care Score:     Roll Left and  Right 6  Independent   Sit to Lying 6  Independent   Lying to Sitting on Side of Bed 6 With leg   Independent   Sit to Stand 6 With RW  Independent   Chair/Bed-to-Chair Transfer 6 With Rw  Independent   Car Transfer    Supervision or touching assistance   Walk 10 Feet    Independent   Walk 50 Feet with Two Turns    Supervision or touching assistance   Walk 150 Feet    Supervision or touching assistance   Walk 10 Feet Uneven Surface    Supervision or touching assistance   1 Step (Curb)    Supervision or touching assistance   4 Steps    Supervision or touching assistance   12 Steps    Partial/moderate assistance   Picking Up Object 5 With reacher  Independent   Wheel 50 Feet with Two Turns    Independent   Wheel 150 Feet    Independent       Therapeutic Activities and Exercises:  Supine therx 15 x 3 BLE    Hip and knee flexion    Hip abd   Glute squeezes    Quad set    SLR (AAROM)     Seated there X 15 x 3 RLE 2#; LLE 0#    Knee ext   Hip flex    Ankle pumps    Hip abd with ball    Hip add with red theraband    Knee flex with red theraband     Activity Tolerance: Excellent    Patient left supine with all lines intact and call button in reach.    Education provided: roles and goals of PT/PTA, transfer training, bed mob, gait training, strengthening exercises, and fall prevention    Expected compliance: High compliance    GOALS:   Multidisciplinary Problems       Physical Therapy Goals          Problem: Physical Therapy    Goal Priority Disciplines Outcome Goal Variances Interventions   Physical Therapy Goal     PT, PT/OT Ongoing, Progressing     Description: Bed Mobility:  Roll left and right with supervision/touching assist. (MET)  Sit to supine transfer with supervision/touching assist. (MET)  Supine to sit transfer with supervision/touching assist. (MET)  Roll left and right independently  Sit to supine transfer with setup/clean-up assist  Supine to sit transfer with setup/clean-up assist      Transfers:  Sit  to stand transfer with supervision/touching assist using RW. (MET)  Bed to chair transfer with supervision/touching assist using RW. (MET)  Car transfer with partial/moderate assist using RW. (MET)   an object from the ground in standing position with supervision/touching assist using RW and reacher. (MET)  Sit to stand transfer independently using RW  Bed to chair transfer with setup/clean-up assist using RW  Car transfer with setup/clean-up assist using RW   an object from the ground in standing position independently using RW        Mobility:  Ambulate 50 feet with supervision/touching assist using RW. (MET)  Ambulate 15 feet on uneven surfaces/ramps with partial/moderate assist using RW. (MET)  Ascend/descend a 4 inch curb with partial/moderate assist using RW.   Ascend/descend 5 stairs with partial/moderate assist using left handrail.  Ambulate 15 feet on uneven surfaces/ramps with setup/clean-up assist using RW  Ambulate 100 feet with supervision/touching assist using RW                           Plan:     During this hospitalization, patient to be seen daily (5-7x/week) to address the identified rehab impairments via gait training, therapeutic exercises, therapeutic activities, wheelchair management/training and progress toward the following goals:    Plan of Care Expires:  11/16/22  PT Next Visit Date: 11/16/22  Plan of Care reviewed with: patient, family    Additional Information:         Time Tracking:     Therapy Time  PT Received On: 11/15/22  PT Start Time: 1100  PT Stop Time: 1200  PT Total Time (min): 60 min   PT Individual: 60  Missed Time:    Time Missed due to:      Billable Minutes: Therapeutic Activity 15 and Therapeutic Exercise 45    11/15/2022

## 2022-11-15 NOTE — PLAN OF CARE
Problem: Occupational Therapy  Goal: Occupational Therapy Goal  Description: ADLs:  Pt to perform grooming tasks with set up and standing at the sink. MET  Pt to perform UB dressing with set up MET  Pt to perform LB dressing with Partial/mod assist with AE as needed. MET  Pt to perform putting on/off footwear task with Max assist with AE as needed. MET  Pt to perform toileting with partial/mod assist. MET    Functional Transfers:  Pt to perform toilet transfers with partial/mod assist and RW. MET  Pt to perform a tub transfer with partial/mod assist and RW/TTB. MET   Pt to perform a walk-in shower transfer with partial/mod assist and RW. MET    IADLs:  Pt to perform simple meal prep with partial/mod assist with RW     Balance, Strengthening, Endurance, Balance:  Pt to consistently demonstrate adherence to orthopedic precautions during all ADL's as instructed by OT. MET  Pt to demonstrate fair dynamic standing balance as required to perform ADL's from standing level. MET  Pt to demonstrate good BUE strength during functional task   Pt to demonstrate consistent adherence to breathing control and energy conservation techniques as educated by OT. MET  Outcome: Ongoing, Progressing

## 2022-11-16 PROCEDURE — 97164 PT RE-EVAL EST PLAN CARE: CPT

## 2022-11-16 PROCEDURE — 94799 UNLISTED PULMONARY SVC/PX: CPT

## 2022-11-16 PROCEDURE — 99233 SBSQ HOSP IP/OBS HIGH 50: CPT | Mod: ,,, | Performed by: PHYSICAL MEDICINE & REHABILITATION

## 2022-11-16 PROCEDURE — 94761 N-INVAS EAR/PLS OXIMETRY MLT: CPT

## 2022-11-16 PROCEDURE — 97530 THERAPEUTIC ACTIVITIES: CPT | Mod: CQ

## 2022-11-16 PROCEDURE — 25000003 PHARM REV CODE 250: Performed by: NURSE PRACTITIONER

## 2022-11-16 PROCEDURE — 99233 PR SUBSEQUENT HOSPITAL CARE,LEVL III: ICD-10-PCS | Mod: ,,, | Performed by: PHYSICAL MEDICINE & REHABILITATION

## 2022-11-16 PROCEDURE — 11800000 HC REHAB PRIVATE ROOM

## 2022-11-16 PROCEDURE — 97535 SELF CARE MNGMENT TRAINING: CPT

## 2022-11-16 PROCEDURE — 97530 THERAPEUTIC ACTIVITIES: CPT

## 2022-11-16 PROCEDURE — 97116 GAIT TRAINING THERAPY: CPT | Mod: CQ

## 2022-11-16 PROCEDURE — 99900035 HC TECH TIME PER 15 MIN (STAT)

## 2022-11-16 PROCEDURE — 97110 THERAPEUTIC EXERCISES: CPT

## 2022-11-16 RX ORDER — TAMSULOSIN HYDROCHLORIDE 0.4 MG/1
0.4 CAPSULE ORAL NIGHTLY
Status: DISCONTINUED | OUTPATIENT
Start: 2022-11-16 | End: 2022-11-17 | Stop reason: HOSPADM

## 2022-11-16 RX ADMIN — TAMSULOSIN HYDROCHLORIDE 0.4 MG: 0.4 CAPSULE ORAL at 09:11

## 2022-11-16 RX ADMIN — GABAPENTIN 200 MG: 100 CAPSULE ORAL at 06:11

## 2022-11-16 RX ADMIN — GABAPENTIN 200 MG: 100 CAPSULE ORAL at 01:11

## 2022-11-16 RX ADMIN — METHOCARBAMOL 750 MG: 750 TABLET ORAL at 09:11

## 2022-11-16 RX ADMIN — POTASSIUM CHLORIDE 10 MEQ: 750 TABLET, EXTENDED RELEASE ORAL at 09:11

## 2022-11-16 RX ADMIN — METHOCARBAMOL 750 MG: 750 TABLET ORAL at 01:11

## 2022-11-16 RX ADMIN — APIXABAN 5 MG: 5 TABLET, FILM COATED ORAL at 09:11

## 2022-11-16 RX ADMIN — DOCUSATE SODIUM 100 MG: 100 CAPSULE, LIQUID FILLED ORAL at 09:11

## 2022-11-16 RX ADMIN — GABAPENTIN 200 MG: 100 CAPSULE ORAL at 09:11

## 2022-11-16 RX ADMIN — NALOXEGOL OXALATE 25 MG: 25 TABLET, FILM COATED ORAL at 09:11

## 2022-11-16 RX ADMIN — FUROSEMIDE 40 MG: 40 TABLET ORAL at 09:11

## 2022-11-16 RX ADMIN — PANTOPRAZOLE SODIUM 40 MG: 40 TABLET, DELAYED RELEASE ORAL at 09:11

## 2022-11-16 RX ADMIN — METHOCARBAMOL 750 MG: 750 TABLET ORAL at 06:11

## 2022-11-16 RX ADMIN — METHOCARBAMOL 750 MG: 750 TABLET ORAL at 05:11

## 2022-11-16 NOTE — PROGRESS NOTES
Subjective:  HPI: 87 y.o. WF with PMH of DVT, osteoporosis, and vitamin-D deficiency presented to [facility] ED on 10/31/2022 for mechanical fall.  Patient reports she was walking to the bathroom when she fell onto her left hip.  Patient denies hitting head, LOC, chest pain, shortness of breath, abdominal pain, nausea, vomiting, dizziness, syncope, and fever.  Patient reports improvement of left hip pain after pain medications given in ED. Initial vital signs in the ED were /68, pulse 70, respirations 16, temperature 36.8° C, and SpO2 97%.  Labs revealed WBC 14.8, chloride 109, BUN 22.4, and glucose 148.  Chest x-ray revealed no acute pulmonary process.  Left hip x-ray revealed comminuted left intertrochanteric femur fracture.  Orthopedics was consulted with plans for surgery. Patient was admitted to hospital medicine service for further medical management. She underwent left intertrochanteric femur fracture intramedullary nailing on 11/1 with Dr. Friend. Participating with therapy. Functional status includes bed mobility and transfers requiring minimum assistance. Amb w/RW for 5 ft. Patient was evaluated, accepted, and admitted to inpatient rehab to improve functional status. Transferred to [facility] on 11/3 without incident.    11/16: Seen in patient room, seated in WC. Mccarty catheter reinserted 2/2 continued urinary retention. Patient obviously disappointed. Discussed f/u with Urology outpatient. States that she has never had any other doctors than PCP that she saw yearly for check-ups. Discussed discharge plans and home exercises. Appreciative of her time here on Rehab as she leaves tomorrow. No complaints. VSSAF.      Review of Systems  Depression/Anxiety: no complaints     ALPRAZolam tablet 0.25 mg TID PRN Anxiety  Pain: left hip with movement-controlled     acetaminophen tablet 650 mg TID  methocarbamoL tablet 750 mg TID  acetaminophen tablet 650 mg q4hr PRN mild pain  oxyCODONE immediate release tablet 5 mg q4hr  PRN mod pain  Bowels/Bladder: last BM 11/16   DC Mccarty 11/15-replaced 11/16 2/2 continued retention   Appetite: decreased from baseline-improving    Sleep: good          Physical Exam  General: well-developed, well-nourished, in no acute distress  Respiratory: equal chest rise, no SOB, no audible wheeze  Cardiovascular: regular rate and rhythm, BLE edema  Gastrointestinal: soft, non-tender, non-distended   Musculoskeletal: decreased ROM/strength to LLE  Integumentary: no rashes or skin lesions present, Left hip incisions x 4-staples, dressings-drainage saturated dressing   Neurologic: cranial nerves intact, no signs of peripheral neurological deficit, motor/sensory function intact  *MD performed and documented physical examination        Assessment/Plan  Hospital   Closed comminuted intertrochanteric fracture of left femur     Non-Hospital   Osteoporosis   Cardiac arrhythmia   Other pulmonary embolism without acute cor pulmonale   Chronic deep vein thrombosis (DVT) of distal vein of lower extremity   Low vitamin D level       Wounds: Left hip incisions x 4-staples, dressings-drainage saturated dressing   S/p left intertrochanteric femur fracture intramedullary nailing on 11/1 with Dr. Friend  Precautions: WBAT LLE  Bracing/AD: RW  Swallowing: Regular Diet  Function: Tolerating therapy. Continue PT/OT  VTE Prophylaxis: SCDs. Eliquis on Hold. Receiving Blood.   Code Status: FULL CODE   Discharge:Lives alone in the [place] in a single-story home with 4-5 steps and bilateral rails to enter the residence. She completed high school. No  history. She is a retired . Patient is . She lives alone and was completely independent. Pts son lives behind her. He states that they have good family support and will be able to assist her after rehab.    Children (4). Date 11/17 Thursday.     Dos 11/16/22  Patient seen in PT gym today  Participation and progress toward goals noted  Continues working on  strength, mobility, balance and increasing endurance  Progress and problems discussed with patient and therapists  Questions answered  Chart reviewed and discussed with Dr gonzalez and medicine team as well as Dedra Ragland, my FNP  Continue present management             Dilcia Ragland NP, conducted additional independent physical examination and assisted with medical documentation.

## 2022-11-16 NOTE — PROGRESS NOTES
11/16/22 0900   Rec Therapy Time Calculation   Date of Treatment 11/16/22   Rec Start Time 0900   Rec Stop Time 0930   Rec Total Time (min) 30 min   Time   Treatment time 2 units   Precautions   General Precautions fall   Orthopedic Precautions  LLE weight bearing as tolerated   Braces N/A   OTHER   Rehab identified problem list/impairments weakness;impaired endurance;impaired functional mobility;gait instability;decreased lower extremity function   Values/Beliefs/Spiritual Care   Spiritual, Cultural Beliefs, Anglican Practices, Values that Affect Care no   Overall Level of Functioning   Activity Tolerance Independent   Dynamic Sitting Balance/Reaching Does not occur   Dynamic Standing Balance/Reaching Independent   Right UE Coodination/Dexterity Independent   Left UE Coordination/Dexterity Independent   Problem Solving/Sequencing Skills Independent   Memory Recall Independent   R/L Neglect/Inattention Does not occur   Attention Span Independent   Recreational Therapy Short Term Goals   Short Term Goal 1 Progression Met   Short Term Goal 2 Progression Met   Recreational Therapy Long Term Goals   Long Term Goal 1 Progression Met   Long Term Goal 2 Progression Met   Plan   Patient to be seen Daily   Planned Duration Other (Comment)  (1 day)   Treatments Planned Energy conservation training   Treatment plan/goals estblished with Patient/Caregiver Yes

## 2022-11-16 NOTE — PT/OT/SLP DISCHARGE
Recreational Therapy Discharge      Date of Treatment: 11/16/22  Start Time: 0900  Stop Time: 0930  Total Time: 30 min  Missed Time:     Assessment      Niecy Joshua is a 87 y.o. female admitted with a medical diagnosis of Closed comminuted intertrochanteric fracture of left femur with routine healing.  She presents with the following impairments/functional limitations:  weakness, impaired endurance, impaired functional mobility, gait instability, decreased lower extremity function .    Rehab Diagnosis:     Recent Surgery: * No surgery found *      General Precautions: Standard, fall     Orthopedic Precautions:LLE weight bearing as tolerated     Braces: N/A    Rehab Prognosis: Good; patient would benefit from acute skilled Recreational Therapy services to address these deficits and reach maximum level of function.      Impairments: Endurance deficits, Mobility deficits, and Strength deficits  Rehab Potential: Good  Treatment Recommendations: Complete discharge plan  Treatment Diagnosis: Fall, L intertrochanteric femur fx, IMN, DVT, osteoporosis, vitamin D deficiency,  Orientation: Oriented x4  Affect/Behavior: Appropriate and Cooperative  Safety/Judgement: intact   Basic Command Following: intact  Spiritual Cultural: no        History     Past Medical History:   Diagnosis Date    Cardiac arrhythmia     Low vitamin D level     Osteoporosis     Other pulmonary embolism without acute cor pulmonale        Past Surgical History:   Procedure Laterality Date    BREAST MASS EXCISION      x3  benign    INTRAMEDULLARY RODDING OF FEMUR Left 11/1/2022    Procedure: INSERTION, INTRAMEDULLARY AMA, FEMUR;  Surgeon: Florentino Friend DO;  Location: [facility];  Service: Orthopedics;  Laterality: Left;  LEFT    LAPAROSCOPIC REPAIR OF INGUINAL HERNIA Bilateral        Home Environment     Admit Date: 11/03/22  Living Situation  Lives With: alone  Lives in: house  Patients Responsibilities: , Financial management, Health and  "wellness, Laundry, Leisure/play/hobbies, Meal preparation, Retired, Shopping, Yard Work  Number of Children: 4  Occupation:Retired:     Instrumental Activities of Daily Living     Previous Hand Dominance: Right Current Hand Dominance: Right     Other iADL Information:          Cognitive Skills Building         Cognitive Observation Activity Assist Position Equipment Response            Comment:      Dynamic Activities      Activity Assist Position Equipment Response   Activity 1 Bocce ball independence Standing Rolling walker and Bocce balls good   Comment: Sit to stand was I as was dynamic standing balance/reaching. Standing tolerance was 10 minutes. UE coordination and sequenicng skills were I. Playfully competitive with staff       Fine Motor Activities      Activity Assist Position Equipment Response           Comment:        Goals     Patient Goals  Patient Goal 1: "To be able to walk."    Short Term Goals    Goal  Goal Status   Will increase sit to stand to supervision Met   Will improve dynamic standing balance/reaching to supervision Met                 Long Term Goals    Goal Goal Status   Will increase standing tolerance to 5 mintues Met   Will improve dynamic standing balance/reaching to setup Met                     Plan       Patient to be seen: Daily  Duration: Other (Comment) (1 day)  Treatments planned: Energy conservation training  Treatment plan/goals established with Patient/Caregiver: Yes     "

## 2022-11-16 NOTE — PROGRESS NOTES
[facility] ([facility])  Rehab Progress Note    Patient Name: Niecy Joshua  MRN: [id number]  Age: 87 y.o. Sex: female  : 1935  Hospital Length of Stay: 13 days  Date of Service: 2022   Chief Complaint: Left hip fracture s/p IMN on 2022    Subjective:     Basic Information  Admit Information: 87-year-old white female presented to [facility] ED on 10/31/2022 complaining of left hip pain after falling from ground level.  PMH significant for unprovoked DVT diagnosed in  and osteoporosis.  Work up significant for hip fracture.  Tolerated left IMN on  without perioperative complications.  Orthopedic surgery recommended WBAT to RLE.  H&H continues to trend downward with hemoglobin 7.1 requiring 1 unit PRBC transfusion.  Tolerated transfer to [facility] inpatient rehab unit on  without incident.  Today's Information: No acute events overnight.  Sitting up in chair.  Reports good sleep and appetite.  Last BM 11/15.  Vital signs at goal with no recorded fevers.  No new labs or imaging today.  Required reinsertion of indwelling catheter on 11/15 due to retention.    Review of patient's allergies indicates:  No Known Allergies     Current Facility-Administered Medications:     0.9%  NaCl infusion (for blood administration), , Intravenous, Q24H PRN, Luis Felipe A Julius, FNP    acetaminophen tablet 650 mg, 650 mg, Oral, Q4H PRN, Luis Felipe A Julius, FNP    ALPRAZolam tablet 0.25 mg, 0.25 mg, Oral, TID PRN, Luis Felipe A Julius, FNP    apixaban tablet 5 mg, 5 mg, Oral, BID, Luis Felipe A Julius, FNP, 5 mg at 22 0950    benzonatate capsule 100 mg, 100 mg, Oral, TID PRN, Luis Felipe A Julius, FNP    bisacodyL suppository 10 mg, 10 mg, Rectal, Daily PRN, Luis Felipe A Julius, FNP    docusate sodium capsule 100 mg, 100 mg, Oral, BID, Luis Felipe A Julius, FNP, 100 mg at 22 0950    furosemide tablet 40 mg, 40 mg, Oral, Daily, Luis Felipe A Julius, FNP, 40 mg at 22 0917     gabapentin capsule 200 mg, 200 mg, Oral, TID, Luis Felipe A Julius, FNP, 200 mg at 11/16/22 0616    hydrALAZINE injection 10 mg, 10 mg, Intravenous, Q4H PRN, Luis Felipe A Julius, FNP    hydrOXYzine pamoate capsule 50 mg, 50 mg, Oral, Nightly PRN, Luis Felipe A Julius, FNP    labetalol 20 mg/4 mL (5 mg/mL) IV syring, 10 mg, Intravenous, Q4H PRN, Luis Felipe A Julius, FNP    methocarbamoL tablet 750 mg, 750 mg, Oral, QID, Luis Felipe A Julius, FNP, 750 mg at 11/16/22 0617    metoprolol injection 10 mg, 10 mg, Intravenous, Q2H PRN, Luis Felipe A Julius, FNP    naloxegoL (MOVANTIK) tablet 25 mg, 25 mg, Oral, Daily, Luis Felipe A Julius, FNP, 25 mg at 11/16/22 0950    nitroGLYCERIN SL tablet 0.4 mg, 0.4 mg, Sublingual, Q5 Min PRN, Luis Felipe A Julius, FNP    ondansetron disintegrating tablet 4 mg, 4 mg, Oral, Q6H PRN, Luis Felipe A Julius, FNP    oxyCODONE immediate release tablet 5 mg, 5 mg, Oral, Q4H PRN, Luis Felipe A Julius, FNP, 5 mg at 11/14/22 1254    pantoprazole EC tablet 40 mg, 40 mg, Oral, Daily, Luis Felipe A Julius, FNP, 40 mg at 11/16/22 0950    polyethylene glycol packet 17 g, 17 g, Oral, BID PRN, Luis Felipe A Julius, FNP, 17 g at 11/08/22 0829    potassium chloride SA CR tablet 10 mEq, 10 mEq, Oral, Daily, Luis Felipe A Julius, FNP, 10 mEq at 11/16/22 0950    promethazine tablet 25 mg, 25 mg, Oral, Q6H PRN, Luis Felipe A Julius, FNP     Review of Systems   Complete 12-point review of symptoms negative except for what's mentioned in HPI     Objective:     /64   Pulse 90   Temp 98.6 °F (37 °C) (Oral)   Resp 18   Wt 77.2 kg (170 lb 3.1 oz)   SpO2 96%   BMI 26.66 kg/m²      Physical Exam  Constitutional:       Appearance: Normal appearance.   HENT:      Head: Normocephalic.      Mouth/Throat:      Mouth: Mucous membranes are moist.   Eyes:      Pupils: Pupils are equal, round, and reactive to light.   Cardiovascular:      Rate and Rhythm: Normal rate and regular rhythm.      Heart sounds: Normal heart  sounds.      Comments: Regular rate with intermittent pauses.  LLE dependent edema  Pulmonary:      Effort: Pulmonary effort is normal.      Breath sounds: Normal breath sounds.   Abdominal:      General: Bowel sounds are normal.      Palpations: Abdomen is soft.   Musculoskeletal:      Cervical back: Neck supple.      Comments: Generalized weakness and muscle atrophy.  Left hip dressing clean and intact-moderate swelling appreciated.    Skin:     General: Skin is warm and dry.   Neurological:      General: No focal deficit present.      Mental Status: She is alert and oriented to person, place, and time.   Psychiatric:         Mood and Affect: Mood normal.         Behavior: Behavior normal.         Thought Content: Thought content normal.         Judgment: Judgment normal.   *MD performed and documented physical examination       Lines/Drains/Airways       None               Labs  No results found for this or any previous visit (from the past 24 hour(s)).    Radiology  Left hip XR on 11/01/2022 at 9:34 a.m., IMPRESSION:Improved alignment following internal fixation of the left femur.    Assessment/Plan:     87 y.o. WF admitted on 11/3/2022    Left hip fracture   - s/p IMN on 11/01/2022  - WBAT to RLE  - staples to remove on 11/14/2022  - discontinued Tylenol 650 mg t.i.d. on 11/15  - continue   Gabapentin 200 mg t.i.d. (initiated 11/15)   K-Dur 10 mEq daily (initiated 11/11) (decreased 11/15)  Lasix 40 mg daily oral (initiated 11/9) (decreased 11/15)  Oxycodone 5 mg q.4 hours p.r.n.   Robaxin 750 mg q.i.d.   - defer to physiatry for rehab and pain management  - PT/OT/RT/ST following     Unprovoked left lower extremity DVT  - Diagnosed in 2020  - continue  Eliquis 5 mg b.i.d.(resumed 11/7)     Normocytic anemia  - asymptomatic  - s/p transfusion  x2 units PRBC on 11/4/2022  x1 units PRBC on 11/3/2022  - H/H trending up  - no evidence of active bleeds  - will closely monitor and transfuse if needed      GERD  - Avoid  spicy foods, and nothing to eat or drink within x2 hours of bedtime or laying flat (water is ok)   - Avoid NSAIDs (Advil, ibuprofen, naproxen...) and shankar-2 inhibitors (Mobic, Celebrex)    - continue  Protonix 40 mg daily      Vitamin D deficiency  - vitamin-D level 32.0 on 11/05  - continue  Vitamin-D 5000 units daily      Constipation  - last BM 11/14  - continue  Colace 100 mg BID   Miralax 17 gm BID PRN     Urinary retention   - current   - Indwelling catheter reinserted 11/2, discontinued indwelling catheter 11/9, reinserted on 11/10  - discontinued indwelling catheter on 11/15, reinserted on 11/15  - initiate   Flomax 0.4 mg nightly x 2 weeks and to follow up with Urology outpatient     VTE Prophylaxis:  Eliquis 5 mg b.i.d.-resumed 11/7    COVID-19 testing:  Negative on 11/03/2022  COVID-19 vaccination status:  Vaccinated (Moderna):  07/23/2021 and 08/20/2021, and received 2 boosters     POA: Yes  Living will: Yes  Contacts: Lukas Joshua (son) [phone]                     Marya Gaytan (daughter) [phone]     CODE STATUS: Full Code  Internal Medicine (attending): Cb Fall MD  Physiatry (consulting):  Toño Polk MD     OUTPATIENT PROVIDERS  PCP:  King Duvall MD  Orthopedic surgery:  Florentino Friend MD     DISPOSITION: Condition stable.  Sleep hygiene, bowel maintenance, and appetite at goal.  Last BM 11/14.  Vital signs at goal with no recorded fevers.  No labs or imaging today.  Required reinsertion of indwelling catheter on 11/15 due to retention.  Initiate Flomax 0.4 mg at bedtime.  Plan to discontinue home with leg bag and to follow-up with urology in 2 weeks Continue aggressive mobility as tolerated.  Monitor closely.  Notify of acute changes.    Staffing 11/14/2022: Incontinent of bowel.  Indwelling catheter.  Discontinue in am.  RT: Overall set up to independent. Doing well.  Appetite is good. PT: Moderate fall risk.  Inconsistent.  Needs wheelchair for home. Ambulating 70 feet RW  CGA.  Limited by pain, fatigue and endurance.  Working on steps today with family training.  OT: Overall supervision to independent with ADLs.  Toileting needs min assistance for thorough cleaning. Projected discharge 11/17.     Brooks Madrid NP conducted independent physical examination and assisted with medical documentation.    Total time spent on this encounter including chart review and direct MD + NP 1-on-1 patient interaction: 39 minutes   Over 50% of this time was spent in counseling and coordination of care

## 2022-11-16 NOTE — PLAN OF CARE
Problem: Physical Therapy  Goal: Physical Therapy Goal  Description: Bed Mobility:  Roll left and right with supervision/touching assist. (MET)  Sit to supine transfer with supervision/touching assist. (MET)  Supine to sit transfer with supervision/touching assist. (MET)  Roll left and right independently  Sit to supine transfer with setup/clean-up assist  Supine to sit transfer with setup/clean-up assist      Transfers:  Sit to stand transfer with supervision/touching assist using RW. (MET)  Bed to chair transfer with supervision/touching assist using RW. (MET)  Car transfer with partial/moderate assist using RW. (MET)   an object from the ground in standing position with supervision/touching assist using RW and reacher. (MET)  Sit to stand transfer independently using RW  Bed to chair transfer with setup/clean-up assist using RW  Car transfer with setup/clean-up assist using RW MET   an object from the ground in standing position independently using RW        Mobility:  Ambulate 50 feet with supervision/touching assist using RW. (MET)  Ambulate 15 feet on uneven surfaces/ramps with partial/moderate assist using RW. (MET)  Ascend/descend a 4 inch curb with partial/moderate assist using RW.   Ascend/descend 5 stairs with partial/moderate assist using left handrail.  Ambulate 15 feet on uneven surfaces/ramps with setup/clean-up assist using RW  Ambulate 100 feet with supervision/touching assist using RW      Outcome: Ongoing, Progressing

## 2022-11-16 NOTE — PLAN OF CARE
Problem: Urinary Retention  Goal: Effective Urinary Elimination  Outcome: Ongoing, Progressing  Intervention: Promote Effective Urine Elimination  Flowsheets (Taken 11/16/2022 0044)  Urinary Elimination Promotion: catheter patency maintained

## 2022-11-16 NOTE — PT/OT/SLP EVAL
Physical Therapy Rehab Evaluation    Patient Name:  Niecy Joshua   MRN:  [id number]    Recommendations:     Discharge Recommendations:      Discharge Equipment Recommendations: walker, rolling   Barriers to discharge: None    Assessment:     Niecy Joshua is a 87 y.o. female admitted with a medical diagnosis of Closed comminuted intertrochanteric fracture of left femur with routine healing.  She presents with the following impairments/functional limitations:  weakness, impaired endurance, impaired functional mobility, gait instability, impaired balance, decreased safety awareness, pain, decreased ROM .    Rehab Diagnosis: L femur fx s/p IM nail    Recent Surgery: * No surgery found *      General Precautions: Standard, fall     Orthopedic Precautions: LLE weight bearing as tolerated     Braces: N/A    Rehab Prognosis: Good; patient would benefit from acute skilled PT services to address these deficits and reach maximum level of function.      History:     Past Medical History:   Diagnosis Date    Cardiac arrhythmia     Low vitamin D level     Osteoporosis     Other pulmonary embolism without acute cor pulmonale        Past Surgical History:   Procedure Laterality Date    BREAST MASS EXCISION      x3  benign    INTRAMEDULLARY RODDING OF FEMUR Left 11/1/2022    Procedure: INSERTION, INTRAMEDULLARY AMA, FEMUR;  Surgeon: Florentino Friend DO;  Location: [facility];  Service: Orthopedics;  Laterality: Left;  LEFT    LAPAROSCOPIC REPAIR OF INGUINAL HERNIA Bilateral        Subjective     Chief Complaint: N/A  Patient/Family Comments/goals: To get better     Patients cultural, spiritual, Hoahaoism conflicts given the current situation:         Living Environment  Lives With: alone  Living Arrangements: house  Home Accessibility: stairs to enter home  Number of Stairs, Main Entrance: five  Stair Railings, Main Entrance: railings on both sides of stairs    Prior Level of Function  Ambulation Skills: independent  Stairs:  independent  Transfer Skills: independent  ADL Skills: independent  Work/Leisure Activity: independent  Cognitive Communication: independent    Equipment used at home:  .  DME owned (not currently used): rolling walker.      Upon discharge, patient will have assistance from family.    Objective:     Communicated with RN prior to session.  Patient found up in chair with    upon PT entry to room.    Vitals   Vitals at Rest  BP     HR     O2 Sat     Pain Pain Rating 1: 4/10  Location - Side 1: Left  Location 1: hip  Pain Addressed 1: Reposition     Vitals With Activity  BP     HR     O2 Sat     Pain       Respiratory Status: Room air    Exams      Lower Extremity Strength:    Right LE  Left LE    Knee extension: 4 Knee extension: 3 limited 2/2 incision    Knee flexion: 4 Knee flexion: 4   Hip flexion: 4 Hip flexion: 2+ limited 2/2 pain    Hip extension:  Not Tested Hip extension: Not Tested   Hip abduction: 4 Hip abduction: 4   Hip adduction: 4- Hip adduction: 4   Ankle dorsiflexion: 4+ Ankle dorsiflexion: 4+   Ankle plantarflexion: 4+ Ankle plantarflexion: 4+        Functional Mobility      GGs   Admit Current   Status Goal   Functional Area: Care Score: Care Score: Care Score:   Roll Left and Right 3 6 Independent   Sit to Lying 3 6 Independent   Lying to Sitting on Side of Bed 3 6 Independent   Sit to Stand 3 6 Independent   Chair/Bed-to-Chair Transfer 3 6 Independent   Car Transfer 88  Supervision or touching assistance   Walk 10 Feet 3 4 Independent   Walk 50 Feet with Two Turns 88 4    ~104' step to progressing to step through gait pattern. Slowed pace and decreased step length noted. Pt demos improved WB throughout  Supervision or touching assistance   Walk 150 Feet 88 88 Supervision or touching assistance   Walk 10 Feet Uneven Surface 88  Supervision or touching assistance   1 Step (Curb) 88 4 Supervision or touching assistance   4 Steps 88 4  L sided rail only. VC to use UE to decrease WB Supervision or  touching assistance   12 Steps 88 88 Partial/moderate assistance   Picking Up Object 88 5 Independent   Wheel 50 Feet with Two Turns 4  Independent   Wheel 150 Feet 3  Independent     Therapeutic Activities and Exercises:  Toilet t/f: incontinent BM. PT assisted pt with donning new brief and pants. Assisted with hygiene.     Seated therX RLE 2# LLE 0# 15 x 3    Hip flexion    Knee ext/ flex (red theraband)    Hip abd/add ball and red theraband    Ankle pumps     Activity Tolerance: Excellent    Patient left up in chair with all lines intact and call button in reach.    Education Provided: roles and goals of PT/PTA, transfer training, bed mob, gait training, stair training, balance training, safety awareness, body mechanics, assistive device, strengthening exercises, and fall prevention    Expected compliance: High compliance    GOALS:   Multidisciplinary Problems       Physical Therapy Goals          Problem: Physical Therapy    Goal Priority Disciplines Outcome Goal Variances Interventions   Physical Therapy Goal     PT, PT/OT Ongoing, Progressing     Description: Bed Mobility:  Roll left and right with supervision/touching assist. (MET)  Sit to supine transfer with supervision/touching assist. (MET)  Supine to sit transfer with supervision/touching assist. (MET)  Roll left and right independently  Sit to supine transfer with setup/clean-up assist  Supine to sit transfer with setup/clean-up assist      Transfers:  Sit to stand transfer with supervision/touching assist using RW. (MET)  Bed to chair transfer with supervision/touching assist using RW. (MET)  Car transfer with partial/moderate assist using RW. (MET)   an object from the ground in standing position with supervision/touching assist using RW and reacher. (MET)  Sit to stand transfer independently using RW  Bed to chair transfer with setup/clean-up assist using RW  Car transfer with setup/clean-up assist using RW   an object from the ground  in standing position independently using RW        Mobility:  Ambulate 50 feet with supervision/touching assist using RW. (MET)  Ambulate 15 feet on uneven surfaces/ramps with partial/moderate assist using RW. (MET)  Ascend/descend a 4 inch curb with partial/moderate assist using RW.   Ascend/descend 5 stairs with partial/moderate assist using left handrail.  Ambulate 15 feet on uneven surfaces/ramps with setup/clean-up assist using RW  Ambulate 100 feet with supervision/touching assist using RW                           Plan:     During this hospitalization, patient to be seen daily (5-7x/week) to address the identified rehab impairments via gait training, therapeutic exercises, therapeutic activities, wheelchair management/training and progress toward the following goals:    Plan of Care Expires:  11/30/22  PT Next Visit Date: 11/22/22  Plan of Care reviewed with: patient, family      Additional Infomation:           Time Tracking:     Therapy Time   PT Received On: 11/16/22  PT Start Time: 1100  PT Stop Time: 1200  PT Total Time (min): 60 min  PT Individual: 60  Missed Time:    Time Missed due to:      Billable Minutes: Re-eval 30, Therapeutic Activity 15, and Therapeutic Exercise 15    11/16/2022

## 2022-11-16 NOTE — PT/OT/SLP PROGRESS
Physical Therapy Inpatient Rehab Treatment    Patient Name:  Niecy Joshua   MRN:  [id number]    Recommendations:     Discharge Recommendations:  home health PT, home with home health, home health OT, outpatient PT   Discharge Equipment Recommendations: walker, rolling   Barriers to discharge: None    Assessment:     Niecy Jsohua is a 87 y.o. female admitted with a medical diagnosis of Closed comminuted intertrochanteric fracture of left femur with routine healing.  She presents with the following impairments/functional limitations:   weakness, impaired endurance, impaired functional mobility, gait instability, impaired balance, decreased safety awareness, pain, decreased ROM .    Rehab Diagnosis: L femur fx s/p IM nail    Recent Surgery: * No surgery found *      General Precautions: Standard, fall     Orthopedic Precautions:LLE weight bearing as tolerated     Braces: N/A    Rehab Prognosis: Good; patient would benefit from acute skilled PT services to address these deficits and reach maximum level of function.      History:     Past Medical History:   Diagnosis Date    Cardiac arrhythmia     Low vitamin D level     Osteoporosis     Other pulmonary embolism without acute cor pulmonale        Past Surgical History:   Procedure Laterality Date    BREAST MASS EXCISION      x3  benign    INTRAMEDULLARY RODDING OF FEMUR Left 11/1/2022    Procedure: INSERTION, INTRAMEDULLARY AMA, FEMUR;  Surgeon: Florentino Friend DO;  Location: [facility];  Service: Orthopedics;  Laterality: Left;  LEFT    LAPAROSCOPIC REPAIR OF INGUINAL HERNIA Bilateral        Subjective     Chief Complaint: upset about having the catheter     Respiratory Status: Room air    Patients cultural, spiritual, Jewish conflicts given the current situation: no      Objective:     Communicated with PT prior to session.  Patient found up in chair recliner    upon PT entry to room.    Pt is Oriented x3 and Alert and Cooperative.    Vitals   Vitals at Rest  BP   "  HR    O2 Sat    Pain      Vitals With Activity  BP    HR    O2 Sat    Pain        Functional Mobility:   Transfers:     Sit to Stand: Set-up or clean-up assistance  with rolling walker  Car Transfer: Set-up or clean-up assistance  with  rolling walker  using  Step Transfer, car to mimic patients set at 24".  Ambulate 20 feet on uneven surfaces/ramps with rolling walker with Supervision or touching assistance . Pt walking on mat slow pace, small B steps, CGA during 360 turn for safety.      Current   Status  Discharge   Goal   Functional Area: Care Score:    Roll Left and Right 6 Independent   Sit to Lying 6 Independent   Lying to Sitting on Side of Bed 6 Independent   Sit to Stand 6 Independent   Chair/Bed-to-Chair Transfer 6 Independent   Car Transfer 5 Supervision or touching assistance   Walk 10 Feet 4 Independent   Walk 50 Feet with Two Turns 4 Supervision or touching assistance   Walk 150 Feet 88 Supervision or touching assistance   Walk 10 Feet Uneven Surface 4 Supervision or touching assistance   1 Step (Curb) 4 Supervision or touching assistance   4 Steps 4 Supervision or touching assistance   12 Steps   Partial/moderate assistance   Picking Up Object   Independent   Wheel 50 Feet with Two Turns   Independent   Wheel 150 Feet   Independent       Therapeutic Activities and Exercises:  Co treat with RT, 6 trials of Bocce ball, 15 mins supported standing inside RW without a rest break, pt reaching forward/backward tossing a weighted ball, Supervision. 2x 5 min trials seated rest between sets.     Activity Tolerance: Excellent and Good    Patient left up in chair with call button in reach.    Education provided: roles and goals of PT/PTA, gait training, safety awareness, and assistive device    Expected compliance: High compliance    GOALS:   Multidisciplinary Problems       Physical Therapy Goals          Problem: Physical Therapy    Goal Priority Disciplines Outcome Goal Variances Interventions   Physical " Therapy Goal     PT, PT/OT Ongoing, Progressing     Description: Bed Mobility:  Roll left and right with supervision/touching assist. (MET)  Sit to supine transfer with supervision/touching assist. (MET)  Supine to sit transfer with supervision/touching assist. (MET)  Roll left and right independently  Sit to supine transfer with setup/clean-up assist  Supine to sit transfer with setup/clean-up assist      Transfers:  Sit to stand transfer with supervision/touching assist using RW. (MET)  Bed to chair transfer with supervision/touching assist using RW. (MET)  Car transfer with partial/moderate assist using RW. (MET)   an object from the ground in standing position with supervision/touching assist using RW and reacher. (MET)  Sit to stand transfer independently using RW  Bed to chair transfer with setup/clean-up assist using RW  Car transfer with setup/clean-up assist using RW   an object from the ground in standing position independently using RW        Mobility:  Ambulate 50 feet with supervision/touching assist using RW. (MET)  Ambulate 15 feet on uneven surfaces/ramps with partial/moderate assist using RW. (MET)  Ascend/descend a 4 inch curb with partial/moderate assist using RW.   Ascend/descend 5 stairs with partial/moderate assist using left handrail.  Ambulate 15 feet on uneven surfaces/ramps with setup/clean-up assist using RW  Ambulate 100 feet with supervision/touching assist using RW                           Plan:     During this hospitalization, patient to be seen daily (5-7x/week) to address the identified rehab impairments via gait training, therapeutic exercises, therapeutic activities, wheelchair management/training and progress toward the following goals:    Plan of Care Expires:  11/30/22  PT Next Visit Date: 11/22/22  Plan of Care reviewed with: patient, family    Additional Information:         Time Tracking:     Therapy Time  PT Start Time: 0830  PT Stop Time: 0930  PT Total Time  (min): 60 min   PT Individual: 60  Missed Time:    Time Missed due to:      Billable Minutes: Gait Training 10 and Therapeutic Activity 50    11/16/2022

## 2022-11-16 NOTE — PT/OT/SLP PROGRESS
"Occupational Therapy Inpatient Rehab Treatment    Name: Niecy Joshua  MRN: [id number]    Assessment:  Niecy Joshua is a 87 y.o. female admitted with a medical diagnosis of Closed comminuted intertrochanteric fracture of left femur with routine healing.  She presents with the following impairments/functional limitations:  weakness, impaired endurance, impaired sensation, impaired self care skills, impaired functional mobility, gait instability, impaired balance, decreased upper extremity function, decreased lower extremity function, decreased safety awareness.    General Precautions: Standard, fall     Orthopedic Precautions:LLE weight bearing as tolerated     Braces: N/A    Rehab Prognosis: Good; patient would benefit from acute skilled OT services to address these deficits and reach maximum level of function.      History:     Past Medical History:   Diagnosis Date    Cardiac arrhythmia     Low vitamin D level     Osteoporosis     Other pulmonary embolism without acute cor pulmonale        Past Surgical History:   Procedure Laterality Date    BREAST MASS EXCISION      x3  benign    INTRAMEDULLARY RODDING OF FEMUR Left 11/1/2022    Procedure: INSERTION, INTRAMEDULLARY AMA, FEMUR;  Surgeon: Florentino Friend DO;  Location: [facility];  Service: Orthopedics;  Laterality: Left;  LEFT    LAPAROSCOPIC REPAIR OF INGUINAL HERNIA Bilateral        Subjective     Orientation: Oriented x4    Chief Complaint: getting see put back in    Patient/Family Comments/goals: "to go home tomorrow"    Respiratory Status: Room air    Patients cultural, spiritual, Worship conflicts given the current situation: no       Objective:     Patient found up in chair with see catheter upon OT entry to room.    Mobility   Patient completed:  Sit to Stand Transfer with independence with rolling walker  Stand to Sit Transfer with independence with rolling walker    Functional Mobility  FM in OT kitchen with RW independent    Limiting Factors for " ADLs: motor, endurance, limited ROM, balance, weakness, coordination, and safety awareness   ADLs: Educated pt on how to maneuver and don/doff leg bag of catheter and how to empty bag.    IADLs: In OT kitchen, pt able to perform simple cooking task of making popcorn. Pt able to retrieve items from upper and lower cabinets safely with RW.      Therapeutic Activities  Pt performed FM stringing of cylindrical beads by following a pattern accurately. And then played Toss Tac Toe in standing for 5-8 mins without break.    Patient left up in chair with all lines intact and call button in reach.     Education provided: Roles and goals of OT, ADLs, transfer training, sequencing, safety precautions, fall prevention, and home safety    Multidisciplinary Problems       Occupational Therapy Goals          Problem: Occupational Therapy    Goal Priority Disciplines Outcome Interventions   Occupational Therapy Goal     OT, PT/OT Ongoing, Progressing    Description: ADLs:  Pt to perform grooming tasks with set up and standing at the sink. MET  Pt to perform UB dressing with set up MET  Pt to perform LB dressing with Partial/mod assist with AE as needed. MET  Pt to perform putting on/off footwear task with Max assist with AE as needed. MET  Pt to perform toileting with partial/mod assist. MET    Functional Transfers:  Pt to perform toilet transfers with partial/mod assist and RW. MET  Pt to perform a tub transfer with partial/mod assist and RW/TTB. MET   Pt to perform a walk-in shower transfer with partial/mod assist and RW. MET    IADLs:  Pt to perform simple meal prep with partial/mod assist with RW     Balance, Strengthening, Endurance, Balance:  Pt to consistently demonstrate adherence to orthopedic precautions during all ADL's as instructed by OT. MET  Pt to demonstrate fair dynamic standing balance as required to perform ADL's from standing level. MET  Pt to demonstrate good BUE strength during functional task   Pt to  demonstrate consistent adherence to breathing control and energy conservation techniques as educated by OT. MET                       Time Tracking     OT Received On: 11/16/22  Time In 1000     Time Out 1100  Total Time 60 min  Therapy Time: OT Individual: 60  Missed Time:    Missed Time Reason:      Billable Minutes: Self Care/Home Management 30 and Therapeutic Activity 30    11/16/2022

## 2022-11-17 VITALS
TEMPERATURE: 98 F | WEIGHT: 170.19 LBS | DIASTOLIC BLOOD PRESSURE: 67 MMHG | RESPIRATION RATE: 20 BRPM | OXYGEN SATURATION: 98 % | SYSTOLIC BLOOD PRESSURE: 115 MMHG | BODY MASS INDEX: 26.66 KG/M2 | HEART RATE: 88 BPM

## 2022-11-17 PROCEDURE — 94799 UNLISTED PULMONARY SVC/PX: CPT

## 2022-11-17 PROCEDURE — 25000003 PHARM REV CODE 250: Performed by: NURSE PRACTITIONER

## 2022-11-17 RX ORDER — FUROSEMIDE 40 MG/1
40 TABLET ORAL DAILY
Qty: 10 TABLET | Refills: 0 | Status: SHIPPED | OUTPATIENT
Start: 2022-11-17 | End: 2022-01-01

## 2022-11-17 RX ORDER — TAMSULOSIN HYDROCHLORIDE 0.4 MG/1
0.4 CAPSULE ORAL NIGHTLY
Qty: 30 CAPSULE | Refills: 0 | Status: SHIPPED | OUTPATIENT
Start: 2022-11-17 | End: 2022-01-01

## 2022-11-17 RX ORDER — POTASSIUM CHLORIDE 750 MG/1
10 TABLET, EXTENDED RELEASE ORAL DAILY
Qty: 10 TABLET | Refills: 0 | Status: SHIPPED | OUTPATIENT
Start: 2022-11-17 | End: 2022-11-27

## 2022-11-17 RX ORDER — DOCUSATE SODIUM 100 MG/1
100 CAPSULE, LIQUID FILLED ORAL 2 TIMES DAILY
Qty: 28 CAPSULE | Refills: 0 | Status: SHIPPED | OUTPATIENT
Start: 2022-11-17 | End: 2022-12-01

## 2022-11-17 RX ORDER — APIXABAN 5 MG/1
5 TABLET, FILM COATED ORAL 2 TIMES DAILY
Qty: 180 TABLET | Refills: 0 | Status: SHIPPED | OUTPATIENT
Start: 2022-11-17 | End: 2023-01-01

## 2022-11-17 RX ORDER — PANTOPRAZOLE SODIUM 40 MG/1
40 TABLET, DELAYED RELEASE ORAL DAILY
Qty: 30 TABLET | Refills: 0 | Status: SHIPPED | OUTPATIENT
Start: 2022-11-17 | End: 2022-01-01

## 2022-11-17 RX ORDER — GABAPENTIN 100 MG/1
200 CAPSULE ORAL 3 TIMES DAILY
Qty: 180 CAPSULE | Refills: 0 | Status: SHIPPED | OUTPATIENT
Start: 2022-11-17 | End: 2023-01-01

## 2022-11-17 RX ORDER — METHOCARBAMOL 750 MG/1
750 TABLET, FILM COATED ORAL 4 TIMES DAILY
Qty: 40 TABLET | Refills: 0 | Status: SHIPPED | OUTPATIENT
Start: 2022-11-17 | End: 2022-12-07 | Stop reason: SDUPTHER

## 2022-11-17 RX ADMIN — DOCUSATE SODIUM 100 MG: 100 CAPSULE, LIQUID FILLED ORAL at 07:11

## 2022-11-17 RX ADMIN — PANTOPRAZOLE SODIUM 40 MG: 40 TABLET, DELAYED RELEASE ORAL at 07:11

## 2022-11-17 RX ADMIN — POTASSIUM CHLORIDE 10 MEQ: 750 TABLET, EXTENDED RELEASE ORAL at 07:11

## 2022-11-17 RX ADMIN — FUROSEMIDE 40 MG: 40 TABLET ORAL at 07:11

## 2022-11-17 RX ADMIN — METHOCARBAMOL 750 MG: 750 TABLET ORAL at 05:11

## 2022-11-17 RX ADMIN — APIXABAN 5 MG: 5 TABLET, FILM COATED ORAL at 07:11

## 2022-11-17 RX ADMIN — OXYCODONE HYDROCHLORIDE 5 MG: 5 TABLET ORAL at 07:11

## 2022-11-17 RX ADMIN — GABAPENTIN 200 MG: 100 CAPSULE ORAL at 05:11

## 2022-11-17 RX ADMIN — NALOXEGOL OXALATE 25 MG: 25 TABLET, FILM COATED ORAL at 07:11

## 2022-11-17 NOTE — NURSING
Staples removed steristrips applied, no drainage noted, discharge instructions given to pt and family member, wheeled down to car via staff no distress noted or complaints voiced.

## 2022-11-17 NOTE — PT/OT/SLP DISCHARGE
Physical Therapy Discharge Summary    Name: Niecy Joshua  MRN: [id number]   Principal Problem: Closed comminuted intertrochanteric fracture of left femur with routine healing            Assessment:     Pt has met all initial goals. Pt made good progress with therapy interventions. Pt was at times limited 2/2 pain.     Objective:     GOALS:   Multidisciplinary Problems       Physical Therapy Goals          Problem: Physical Therapy    Goal Priority Disciplines Outcome Goal Variances Interventions   Physical Therapy Goal     PT, PT/OT Ongoing, Progressing     Description: Bed Mobility:  Roll left and right with supervision/touching assist. (MET)  Sit to supine transfer with supervision/touching assist. (MET)  Supine to sit transfer with supervision/touching assist. (MET)  Roll left and right independently (MET)  Sit to supine transfer with setup/clean-up assist (MET)  Supine to sit transfer with setup/clean-up assist (MET)       Transfers:  Sit to stand transfer with supervision/touching assist using RW. (MET)  Bed to chair transfer with supervision/touching assist using RW. (MET)  Car transfer with partial/moderate assist using RW. (MET)   an object from the ground in standing position with supervision/touching assist using RW and reacher. (MET)  Sit to stand transfer independently using RW(MET)  Bed to chair transfer with setup/clean-up assist using RW (MET)  Car transfer with setup/clean-up assist using RW (MET)   an object from the ground in standing position independently using RW    (MET)    Mobility:  Ambulate 50 feet with supervision/touching assist using RW. (MET)  Ambulate 15 feet on uneven surfaces/ramps with partial/moderate assist using RW. (MET)  Ascend/descend a 4 inch curb with partial/moderate assist using RW. (MET)  Ascend/descend 5 stairs with partial/moderate assist using left handrail. (MET)  Ambulate 15 feet on uneven surfaces/ramps with setup/clean-up assist using RW (NOT  MET)  Ambulate 100 feet with supervision/touching assist using RW  (MET)                         Care Scores:   Admission Assessment Current   Status  Discharge   Goal   Functional Area: Care Score:  Care Score:    Roll Left and Right 3 6 Independent   Sit to Lying 3 6 Independent   Lying to Sitting on Side of Bed 3 6 Independent   Sit to Stand 3 6 Independent   Chair/Bed-to-Chair Transfer 3 6 Independent   Car Transfer 88 5 Supervision or touching assistance   Walk 10 Feet 3 4 Independent   Walk 50 Feet with Two Turns 88 4 Supervision or touching assistance   Walk 150 Feet 88 88 Supervision or touching assistance   Walk 10 Feet Uneven Surface 88 4 Supervision or touching assistance   1 Step (Curb) 88 4 Supervision or touching assistance   4 Steps 88 4 Supervision or touching assistance   12 Steps 88 88 Partial/moderate assistance   Picking Up Object 88 5 Independent   Wheel 50 Feet with Two Turns 4 4 Independent   Wheel 150 Feet 3 4 Independent       Reasons for Discontinuation of Therapy Services  Satisfactory goal achievement.      Plan:     Patient Discharged to: Home with Home Health Service and family assistance .    11/17/2022

## 2022-11-17 NOTE — PLAN OF CARE
Problem: Urinary Retention  Goal: Effective Urinary Elimination  Outcome: Ongoing, Progressing  Intervention: Promote Effective Urine Elimination  Flowsheets (Taken 11/16/2022 2007)  Urinary Elimination Promotion: catheter patency maintained

## 2022-11-17 NOTE — PT/OT/SLP DISCHARGE
Occupational Therapy Discharge Summary    Niecy Joshua  MRN: [id number]   Principal Problem: Closed comminuted intertrochanteric fracture of left femur with routine healing      Please refer to prior OT note dated 11/16/2022 for functional status.    Assessment:      Patient has met all goals and is not appropriate for therapy.    Objective:     GOALS:   Multidisciplinary Problems       Occupational Therapy Goals          Problem: Occupational Therapy    Goal Priority Disciplines Outcome Interventions   Occupational Therapy Goal     OT, PT/OT Ongoing, Progressing    Description: ADLs:  Pt to perform grooming tasks with set up and standing at the sink. MET  Pt to perform UB dressing with set up MET  Pt to perform LB dressing with Partial/mod assist with AE as needed. MET  Pt to perform putting on/off footwear task with Max assist with AE as needed. MET  Pt to perform toileting with partial/mod assist. MET    Functional Transfers:  Pt to perform toilet transfers with partial/mod assist and RW. MET  Pt to perform a tub transfer with partial/mod assist and RW/TTB. MET   Pt to perform a walk-in shower transfer with partial/mod assist and RW. MET    IADLs:  Pt to perform simple meal prep with partial/mod assist with RW     Balance, Strengthening, Endurance, Balance:  Pt to consistently demonstrate adherence to orthopedic precautions during all ADL's as instructed by OT. MET  Pt to demonstrate fair dynamic standing balance as required to perform ADL's from standing level. MET  Pt to demonstrate good BUE strength during functional task   Pt to demonstrate consistent adherence to breathing control and energy conservation techniques as educated by OT. MET                       Care Scores:   Admission Assessment Current Status Discharge  Goal   Functional Area: Care Score:  Care Score:    Eating 5 6 Independent   Oral Hygiene 5 6 Independent   Toileting Hygiene 3 6 Set-up/clean-up   Shower/Bathe Self 2 6 Set-up/clean-up    Upper Body Dressing 5 6 Independent   Lower Body Dressing 2 4 Set-up/clean-up   Putting On/Taking Off Footwear 2 6 Set-up/clean-up   Toilet Transfer 3 5 Set-up/clean-up     Reasons for Discontinuation of Therapy Services   Satisfactory goal achievement.  LB dressing goal limited due to SPV needed for safety when pulling up over hips.    Plan:     Patient Discharged to: Home with Home Health Service      11/17/2022

## 2022-11-17 NOTE — DISCHARGE SUMMARY
[facility] ([facility])  Rehab Discharge Summary    Patient Name: Niecy Joshua  MRN: [id number]  Age: 87 y.o. Sex: female  : 1935  Hospital Length of Stay: 14 days   Date of Service: 2022      Discharge Information   Date of Admission: 11/3/2022  Date of Discharge: 2022  Admit Diagnosis:   Left hip fracture          Unprovoked left lower extremity DVT           Normocytic anemia          GERD           Vitamin-D deficiency          Constipation          Urinary retention  Discharge Diagnosis: Left hip fracture (stable)           Unprovoked left lower extremity DVT (stable)           Normocytic anemia (stable)           GERD (stable)           Vitamin-D deficiency (stable)           Constipation (stable)           Urinary retention (current)  COVID-19 testing:  Negative on 2022  COVID-19 vaccination status:  Vaccinated (Moderna):  2021 and 2021, and received 2 boosters     Internal Medicine (attending): Cb Fall MD  Physiatry (consulting):  Toño Polk MD     OUTPATIENT PROVIDERS  PCP:  King Duvall MD  Orthopedic surgery:  Florentino Friend MD    Hospital Course   87-year-old white female presented to [facility] ED on 10/31/2022 complaining of left hip pain after falling from ground level.  PMH significant for unprovoked DVT diagnosed in  and osteoporosis.  Work up significant for hip fracture.  Tolerated left IMN on  without perioperative complications.  Orthopedic surgery recommended WBAT to RLE.  H&H continues to trend downward with hemoglobin 7.1 requiring 1 unit PRBC transfusion.  Tolerated transfer to [facility] inpatient rehab unit on  without incident.  During inpatient rehab course Eliquis held on  and 2 units PRBC transfused.  Lasix 20 mg IVP given between units.  Continue with left lower extremity swelling.  Lasix 60 mg IVP x1 given on .  Continue Lasix 40 mg IVP x3 days on . Eliquis resumed on .   Indwelling catheter discontinued on 11/09, but remained with retention requiring reinsertion.  Indwelling catheter again discontinued on 11/15 but remained with retention.  Flomax 0.4 mg at bedtime initiated.  Indwelling catheter reinserted.  We will need to follow up with Urology outpatient within 2 weeks.  Lasix decreased to 40 mg oral daily and potassium 10 mEq daily.  Tylenol was discontinued due to mild transaminitis.  Gabapentin initiated for pain management.  Percocet as needed.  Vital signs remained stable.  Sleep hygiene and bowel maintenance at goal.  Appetite is good.  Med reconciliation completed.  Overall contact guard assist with rolling walker ambulating 70 ft.  Inconsistent at times.  Limited by pain, fatigue, and endurance.  Completed steps and family training.  OT reports overall supervision to independent with ADLs.  She needs assistance with cleaning after toileting.  Discharge orders initiated.  Stable for transfer home with home health.  To follow-up with scheduled appointments documented below.    Chief Complaint:  Left hip fracture s/p IMN on 11/01/2022    /62   Pulse 86   Temp 98.1 °F (36.7 °C) (Oral)   Resp 20   Wt 77.2 kg (170 lb 3.1 oz)   SpO2 99%   BMI 26.66 kg/m²      Physical Exam  Constitutional:       Appearance: Normal appearance.   HENT:      Head: Normocephalic.      Mouth/Throat:      Mouth: Mucous membranes are moist.   Eyes:      Pupils: Pupils are equal, round, and reactive to light.   Cardiovascular:      Rate and Rhythm: Normal rate and regular rhythm.      Heart sounds: Normal heart sounds.      Comments: Regular rate with intermittent pauses.  LLE dependent edema  Pulmonary:      Effort: Pulmonary effort is normal.      Breath sounds: Normal breath sounds.   Abdominal:      General: Bowel sounds are normal.      Palpations: Abdomen is soft.   Musculoskeletal:      Cervical back: Neck supple.      Comments: Generalized weakness and muscle atrophy.  Left hip  dressing clean and intact-moderate swelling appreciated.    Skin:     General: Skin is warm and dry.   Neurological:      General: No focal deficit present.      Mental Status: She is alert and oriented to person, place, and time.   Psychiatric:         Mood and Affect: Mood normal.         Behavior: Behavior normal.         Thought Content: Thought content normal.         Judgment: Judgment normal.   *MD performed and documented physical examination        Labs  Component Ref Range & Units 11/14/22 0519   WBC 4.5 - 11.5 x10(3)/mcL 7.9    RBC 4.20 - 5.40 x10(6)/mcL 3.48 Low     Hgb 12.0 - 16.0 gm/dL 10.2 Low     Hct 37.0 - 47.0 % 31.2 Low     MCV 80.0 - 94.0 fL 89.7    MCH 27.0 - 31.0 pg 29.3    MCHC 33.0 - 36.0 mg/dL 32.7 Low     RDW 11.5 - 17.0 % 19.8 High     Platelet 130 - 400 x10(3)/mcL 317    MPV 7.4 - 10.4 fL 9.9    Neut % % 58.9    Lymph % % 24.2    Mono % % 12.7    Eos % % 2.9    Basophil % % 0.4    Lymph # 0.6 - 4.6 x10(3)/mcL 1.90    Neut # 2.1 - 9.2 x10(3)/mcL 4.6    Mono # 0.1 - 1.3 x10(3)/mcL 1.00    Eos # 0 - 0.9 x10(3)/mcL 0.23    Baso # 0 - 0.2 x10(3)/mcL 0.03    IG# 0 - 0.04 x10(3)/mcL 0.07 High     IG% % 0.9    NRBC% % 0.0      Component Ref Range & Units 11/14/22 0519   Sodium Level 136 - 145 mmol/L 141    Potassium Level 3.5 - 5.1 mmol/L 4.3    Chloride 98 - 107 mmol/L 109 High     Carbon Dioxide 23 - 31 mmol/L 26    Glucose Level 82 - 115 mg/dL 121 High     Blood Urea Nitrogen 9.8 - 20.1 mg/dL 30.1 High     Creatinine 0.55 - 1.02 mg/dL 0.71    Calcium Level Total 8.4 - 10.2 mg/dL 8.4    Protein Total 5.8 - 7.6 gm/dL 5.5 Low     Albumin Level 3.4 - 4.8 gm/dL 2.8 Low     Globulin 2.4 - 3.5 gm/dL 2.7    Albumin/Globulin Ratio 1.1 - 2.0 ratio 1.0 Low     Bilirubin Total <=1.5 mg/dL 1.3    Alkaline Phosphatase 40 - 150 unit/L 365 High     Alanine Aminotransferase 0 - 55 unit/L 57 High     Aspartate Aminotransferase 5 - 34 unit/L 38 High     eGFR mls/min/1.73/m2 >60      Radiology  Left hip XR on  11/01/2022 at 9:34 a.m., IMPRESSION:Improved alignment following internal fixation of the left femur.    Discharge Summary Plan   Discharge Status: Improved    Location: Discharge home with     Medications: See discharge medicine reconciliation    Activity: as tolerated    Diet: Cardiac    Instructions:  Take all medications as prescribed.      Attend appointments as scheduled.      Return to ED if symptoms worsen, or if t > 100.4.    Education:  Urinary retention.  Anemia.  DVT.    Follow-up:   King Duvall MD on 11/21/2022 at 2:00 p.m.   Florentino Friend D.O. on 11/28/2022 at 10:15 a.m.  Logan Bridges MD-urology will call with appointment    Discussed plan of care, and patient communicated understanding. Agreed to comply with recommendations.    Brooks Madrid NP conducted independent examination and assisted with medical documentation.     Discharge Time: 51 minutes

## 2022-11-17 NOTE — PLAN OF CARE
Discharging today as planned.    Alerted [facility] (to provide HH PT/OT/RN) via [employer].  Will send AVS once completed.    Alerted [facility] (providing w/c) via [employer] re: finalized discharge plan.    No further needs.

## 2022-11-18 PROCEDURE — G0180 MD CERTIFICATION HHA PATIENT: HCPCS | Mod: ,,, | Performed by: PHYSICAL MEDICINE & REHABILITATION

## 2022-11-18 PROCEDURE — G0180 PR HOME HEALTH MD CERTIFICATION: ICD-10-PCS | Mod: ,,, | Performed by: PHYSICAL MEDICINE & REHABILITATION

## 2022-11-21 ENCOUNTER — OFFICE VISIT (OUTPATIENT)
Dept: INTERNAL MEDICINE | Facility: CLINIC | Age: 87
End: 2022-11-21
Payer: MEDICARE

## 2022-11-21 VITALS
HEIGHT: 67 IN | SYSTOLIC BLOOD PRESSURE: 112 MMHG | WEIGHT: 170 LBS | RESPIRATION RATE: 14 BRPM | BODY MASS INDEX: 26.68 KG/M2 | DIASTOLIC BLOOD PRESSURE: 66 MMHG | OXYGEN SATURATION: 96 % | HEART RATE: 101 BPM

## 2022-11-21 DIAGNOSIS — S90.822A BLISTER OF LEFT HEEL, INITIAL ENCOUNTER: ICD-10-CM

## 2022-11-21 DIAGNOSIS — N17.9 AKI (ACUTE KIDNEY INJURY): ICD-10-CM

## 2022-11-21 DIAGNOSIS — Z09 HOSPITAL DISCHARGE FOLLOW-UP: Primary | ICD-10-CM

## 2022-11-21 DIAGNOSIS — Z87.81 S/P LEFT HIP FRACTURE: ICD-10-CM

## 2022-11-21 DIAGNOSIS — M81.0 AGE RELATED OSTEOPOROSIS, UNSPECIFIED PATHOLOGICAL FRACTURE PRESENCE: ICD-10-CM

## 2022-11-21 DIAGNOSIS — Z96.0 URINARY CATHETER IN PLACE: ICD-10-CM

## 2022-11-21 DIAGNOSIS — R33.9 URINARY RETENTION: ICD-10-CM

## 2022-11-21 PROCEDURE — 99496 TRANSJ CARE MGMT HIGH F2F 7D: CPT | Mod: ,,, | Performed by: NURSE PRACTITIONER

## 2022-11-21 PROCEDURE — 99496 TRANSITIONAL CARE MANAGE SERVICE 7 DAY DISCHARGE: ICD-10-PCS | Mod: ,,, | Performed by: NURSE PRACTITIONER

## 2022-11-21 NOTE — PROGRESS NOTES
Subjective:      Patient ID: Niecy Joshua is a 87 y.o. female.    Chief Complaint: hospital f/u (Pt is here for f/u after femur sx left leg after fall on Halloween and had sx 11/1/2022)    Family and/or Caretaker present at visit?  Yes.  Diagnostic tests reviewed/disposition: No diagnosic tests pending after this hospitalization.  Disease/illness education: Yes  Home health/community services discussion/referrals: Patient has home health established at [facility] .   Establishment or re-establishment of referral orders for community resources: No other necessary community resources.   Discussion with other health care providers: No discussion with other health care providers necessary.  I reviewed and reconciled the medications from hospital discharge.    HPI: Patient of Dr. Duvall's here today for hospital follow up. She reported to ER on 10/31/22 s/p fall and landing on L hip. XR noted L comminuted intertrochanteric femur fracture. She was admitted at that time for sx evaluation/tx. Significant h/o osteoporosis on oral biphosphonate with recent transition to Prolia. L femur intramedullary nail placed 11/1/22. D/c rehab 11/3/22 and home 11/17/22. Overall, doing quite well. Able to ambulate without assistance. Denies CP, palpitations, Sob, or s/s bleeding. Bowels, sleep, and pain stable.  She continues to have indwelling urinary cath due to retention s/s. Family denies hearing from Urology as noted upon d/c. Blister noted to L heel with some pain upon palpation or pressure. No h/o T2DM.  Family has been elevating on pillow.    Vaccines: Refused    Review of patient's allergies indicates:  No Known Allergies    Review of Systems  Constitutional: No fever, No chills, No sweats, No fatigue, No weight loss.  Eyes: No blurring.  Ear/Nose/Mouth/Throat: No nasal congestion, No vertigo.  Respiratory: No shortness of breath, No cough, No sputum production, No wheezing, No exertional dyspnea.   Cardiovascular: No chest pain,  "No palpitations, No claudication, No orthopnea, No peripheral edema.  Gastrointestinal: No nausea, No vomiting, No diarrhea, No rectal bleeding, No constipation, No abdominal pain.  Genitourinary: No dysuria, No hematuria, No frequency.  Endocrine: No excessive thirst, No polyuria, No cold intolerance, No heat intolerance.  Musculoskeletal: No joint pain, No muscle pain.  Integumentary: No rash, ecchymosis to LLE. Blister to L heel.  Neurologic: No altered mental status, No headaches.  Psychiatrics: No anxiety,No depression, No SI/HI.    Objective:   /66 (BP Location: Left arm, Patient Position: Sitting, BP Method: Medium (Manual))   Pulse 101   Resp 14   Ht 5' 7" (1.702 m)   Wt 77.1 kg (170 lb)   SpO2 96%   BMI 26.63 kg/m²     Physical Exam  Vitals and nursing note reviewed.   Constitutional:       Appearance: Normal appearance. She is normal weight.   HENT:      Head: Normocephalic and atraumatic.   Cardiovascular:      Rate and Rhythm: Normal rate. Rhythm irregular.      Pulses: Normal pulses.      Heart sounds: Normal heart sounds.   Pulmonary:      Effort: Pulmonary effort is normal.      Breath sounds: Normal breath sounds.   Abdominal:      General: Abdomen is flat. Bowel sounds are normal.      Palpations: Abdomen is soft.   Musculoskeletal:         General: Normal range of motion.      Cervical back: Normal range of motion and neck supple.      Right lower leg: Edema present.      Left lower leg: Edema present.   Skin:     General: Skin is warm and dry.      Findings: Bruising (LLE) and lesion (large blister noted to posterior aspect of L heel) present.   Neurological:      General: No focal deficit present.      Mental Status: She is alert and oriented to person, place, and time. Mental status is at baseline.   Psychiatric:         Mood and Affect: Mood normal.         Thought Content: Thought content normal.         Judgment: Judgment normal.         Assessment/Plan:   1. Hospital discharge " follow-up  Reviewed hospital records.   F/u with Dr. Duvall in 3 months and as needed.    2. Blister of left heel, initial encounter  Advised to keep clean/dry, avoid pressure, and may apply NELLIE if ruptures.     3. S/p left hip fracture  Follow up with specialist that is also caring for this problem.    4. Urinary retention  Refer back to Urology.    - Ambulatory referral/consult to Urology; Future    5. Urinary catheter in place  See above.    - Ambulatory referral/consult to Urology; Future    6. ERICA (acute kidney injury)  Labs stable upon d/c.    7. Age related osteoporosis, unspecified pathological fracture presence  Continue Prolia as ordered.    Medication List with Changes/Refills   Current Medications    ALENDRONATE (FOSAMAX) 70 MG TABLET    TAKE ONE TABLET BY MOUTH WEEKLY 30 MINUTES BEFORE FIRST FOOD AND DO NOT LIE DOWN FOR AT LEAST 30 MINUTES    CALCIUM CARBONATE (OS-LEE) 500 MG CALCIUM (1,250 MG) TABLET    Take 1 tablet by mouth 2 (two) times a day.    DENOSUMAB (PROLIA) 60 MG/ML SYRG    Inject 60 mg into the skin.    DOCUSATE SODIUM (COLACE) 100 MG CAPSULE    Take 1 capsule (100 mg total) by mouth 2 (two) times daily. for 14 days    ELIQUIS 5 MG TAB    Take 1 tablet (5 mg total) by mouth 2 (two) times daily.    FUROSEMIDE (LASIX) 40 MG TABLET    Take 1 tablet (40 mg total) by mouth once daily. for 10 days    GABAPENTIN (NEURONTIN) 100 MG CAPSULE    Take 2 capsules (200 mg total) by mouth 3 (three) times daily.    METHOCARBAMOL (ROBAXIN) 750 MG TAB    Take 1 tablet (750 mg total) by mouth 4 (four) times daily. for 10 days    PANTOPRAZOLE (PROTONIX) 40 MG TABLET    Take 1 tablet (40 mg total) by mouth once daily.    POTASSIUM CHLORIDE SA (K-DUR,KLOR-CON M) 10 MEQ TABLET    Take 1 tablet (10 mEq total) by mouth once daily. for 10 days    TAMSULOSIN (FLOMAX) 0.4 MG CAP    Take 1 capsule (0.4 mg total) by mouth every evening.        Follow up in about 3 months (around 2/21/2023) for With Dr. Duvall.

## 2022-11-28 ENCOUNTER — HOSPITAL ENCOUNTER (OUTPATIENT)
Dept: RADIOLOGY | Facility: CLINIC | Age: 87
Discharge: HOME | End: 2022-11-28
Attending: ORTHOPAEDIC SURGERY
Payer: MEDICARE

## 2022-11-28 ENCOUNTER — OFFICE VISIT (OUTPATIENT)
Dept: ORTHOPEDICS | Facility: CLINIC | Age: 87
End: 2022-11-28
Payer: MEDICARE

## 2022-11-28 VITALS — WEIGHT: 170 LBS | HEIGHT: 67 IN | RESPIRATION RATE: 18 BRPM | BODY MASS INDEX: 26.68 KG/M2

## 2022-11-28 DIAGNOSIS — S72.142D CLOSED COMMINUTED INTERTROCHANTERIC FRACTURE OF LEFT FEMUR WITH ROUTINE HEALING: Primary | ICD-10-CM

## 2022-11-28 DIAGNOSIS — S72.142D CLOSED COMMINUTED INTERTROCHANTERIC FRACTURE OF LEFT FEMUR WITH ROUTINE HEALING: ICD-10-CM

## 2022-11-28 PROCEDURE — 99024 POSTOP FOLLOW-UP VISIT: CPT | Mod: POP,,, | Performed by: PHYSICIAN ASSISTANT

## 2022-11-28 PROCEDURE — 73552 X-RAY EXAM OF FEMUR 2/>: CPT | Mod: LT,,, | Performed by: ORTHOPAEDIC SURGERY

## 2022-11-28 PROCEDURE — 73552 XR FEMUR 2 VIEW LEFT: ICD-10-PCS | Mod: LT,,, | Performed by: ORTHOPAEDIC SURGERY

## 2022-11-28 PROCEDURE — 99024 PR POST-OP FOLLOW-UP VISIT: ICD-10-PCS | Mod: POP,,, | Performed by: PHYSICIAN ASSISTANT

## 2022-11-28 NOTE — PROGRESS NOTES
Subjective:       Patient ID: Niecy Joshua is a 87 y.o. female.  Chief Complaint   Patient presents with    Left Femur - Post-op Evaluation     4 week f/u IMN LEFT IT FEMUR FX, AMBULATING WITH WALKER, HAS BLISTERED AREA AROUND LEFT ANKLE.         HPI  Patient presents for 4 week follow up IMN left IT femur fracture. She is ambulatory with a walker today. She does have a blister to her left heel which has busted yesterday during the night. She has been ambulating on it without difficulty. She denies numbness and tingling distally but was having some nerve pain in the lower leg, controlled with gabapentin. Taking muscle relaxer as needed. No longer taking narcotic pain medication.       ROS:  Constitutional: Denies fever chills  Eyes: No change in vision  ENT: No ringing or current infections  CV: No chest pain  Resp: No labored breathing  MSK: Pain evident at site of injury located in HPI,   Integ: No signs of abrasions or lacerations  Neuro: No numbness or tingling  Lymphatic: No swelling outside the area of injury     Current Outpatient Medications on File Prior to Visit   Medication Sig Dispense Refill    calcium carbonate (OS-LEE) 500 mg calcium (1,250 mg) tablet Take 1 tablet by mouth 2 (two) times a day.      denosumab (PROLIA) 60 mg/mL Syrg Inject 60 mg into the skin.      docusate sodium (COLACE) 100 MG capsule Take 1 capsule (100 mg total) by mouth 2 (two) times daily. for 14 days 28 capsule 0    ELIQUIS 5 mg Tab Take 1 tablet (5 mg total) by mouth 2 (two) times daily. 180 tablet 0    gabapentin (NEURONTIN) 100 MG capsule Take 2 capsules (200 mg total) by mouth 3 (three) times daily. 180 capsule 0    pantoprazole (PROTONIX) 40 MG tablet Take 1 tablet (40 mg total) by mouth once daily. 30 tablet 0    tamsulosin (FLOMAX) 0.4 mg Cap Take 1 capsule (0.4 mg total) by mouth every evening. 30 capsule 0    furosemide (LASIX) 40 MG tablet Take 1 tablet (40 mg total) by mouth once daily. for 10 days 10 tablet 0     "[] methocarbamoL (ROBAXIN) 750 MG Tab Take 1 tablet (750 mg total) by mouth 4 (four) times daily. for 10 days 40 tablet 0    [] potassium chloride SA (K-DUR,KLOR-CON M) 10 MEQ tablet Take 1 tablet (10 mEq total) by mouth once daily. for 10 days 10 tablet 0     No current facility-administered medications on file prior to visit.          Objective:      Resp 18   Ht 5' 7" (1.702 m)   Wt 77.1 kg (169 lb 15.6 oz)   BMI 26.62 kg/m²   Physical Exam  General the patient is alert and oriented x3 no acute distress nontoxic-appearing appropriate affect.    Constitutional: Vital signs are examined and stable.  Resp: No signs of labored breathing    Musculoskeletal:   Left lower extremity: Incisions well healed and maturing, Steri strips remain in place. Staples removed in rehab facility.; compartments are soft and compressible; No pain with ROM at the hip, knee, or ankle; no tenderness to palpation; dorsi/plantar flexes the foot; SILT distally; BCR distally; DP pulse 2+; large blister to posteriorlateral heel       Body mass index is 26.62 kg/m².  Ideal body weight: 61.6 kg (135 lb 12.9 oz)  Adjusted ideal body weight: 67.8 kg (149 lb 7.6 oz)  No results found for: HGBA1C  Hgb   Date Value Ref Range Status   2022 10.2 (L) 12.0 - 16.0 gm/dL Final   2022 10.2 (L) 12.0 - 16.0 gm/dL Final     Hct   Date Value Ref Range Status   2022 31.2 (L) 37.0 - 47.0 % Final   2022 31.6 (L) 37.0 - 47.0 % Final     Iron Level   Date Value Ref Range Status   2022 23 (L) 50 - 170 ug/dL Final     No components found for: FROLATE  Vit D 25 OH   Date Value Ref Range Status   2022 32.0 30.0 - 80.0 ng/mL Final   2022 30.4 30.0 - 80.0 ng/mL Final     WBC   Date Value Ref Range Status   2022 7.9 4.5 - 11.5 x10(3)/mcL Final   2022 7.5 4.5 - 11.5 x10(3)/mcL Final       Radiology:   3 view x ray of the left femur: hardware intact without signs of loosening or failure. Controlled " collapse of the left femoral neck as expected. Fracture alignment well maintained as compared to previous images.         Assessment:         1. Closed comminuted intertrochanteric fracture of left femur with routine healing  X-Ray Femur 2 View Left              Plan:         Follow up in about 8 weeks (around 1/23/2023).    Niecy was seen today for post-op evaluation.    Diagnoses and all orders for this visit:    Closed comminuted intertrochanteric fracture of left femur with routine healing  -     X-Ray Femur 2 View Left; Future      -continue WBAT and ROMAT to the left lower ext  -May shower and wet incisions, pat dry  -Recommend heel lift to the left foot to elevate heel while in bed and prevent further pressure injury or worsening or skin breakdown.   - Discussed signs of worsening with family, continue to monitor and follow up sooner or with PCP if needed if it worsens  - Follow up in 8 weeks for repeat x-rays and evaluation  -ED precautions given    The above findings, diagnostics, and treatment plan were discussed with Dr. Friend who is in agreement with the plan of care except as stated in additional documentation.       Brooke De Guzman PA-C          Future Appointments   Date Time Provider Department Center   1/9/2023 10:00 AM Florentino Friend DO [facility]   2/27/2023 10:00 AM INJECTION CHAIR 01, [facility]   3/1/2023 10:20 AM King Duvall MD [place] [id number]   9/1/2023  9:40 AM King Duvall MD [place] [address]

## 2022-12-05 ENCOUNTER — EXTERNAL HOME HEALTH (OUTPATIENT)
Dept: HOME HEALTH SERVICES | Facility: HOSPITAL | Age: 87
End: 2022-12-05
Payer: MEDICARE

## 2022-12-06 ENCOUNTER — DOCUMENT SCAN (OUTPATIENT)
Dept: HOME HEALTH SERVICES | Facility: HOSPITAL | Age: 87
End: 2022-12-06
Payer: MEDICARE

## 2022-12-08 RX ORDER — METHOCARBAMOL 750 MG/1
750 TABLET, FILM COATED ORAL 4 TIMES DAILY
Qty: 40 TABLET | Refills: 0 | Status: SHIPPED | OUTPATIENT
Start: 2022-12-08 | End: 2022-12-18

## 2022-12-21 ENCOUNTER — TELEPHONE (OUTPATIENT)
Dept: INTERNAL MEDICINE | Facility: CLINIC | Age: 87
End: 2022-12-21
Payer: MEDICARE

## 2022-12-21 NOTE — TELEPHONE ENCOUNTER
----- Message from Lexi Joshua sent at 12/21/2022 11:25 AM CST -----  Regarding: call nikole  Call nikole at [facility] regarding lasix. Her number is [phone]

## 2022-12-28 NOTE — PROGRESS NOTES
Patient ID: Niecy Joshua is a 87 y.o. female.    Chief Complaint: Leg Swelling    87-year-old female here today for a requested visit regarding lower extremity swelling.  PMH of DVT, osteoporosis, and vitamin-D deficiency .  Since last visit patient reports recently had a fall sustaining a left femur fracture.  Underwent left femur IM nailing (11/01/2022) with subsequent rehab stay 11/3 to 11/17/2022.  Did have some lower extremity swelling during hospital /rehab stay and had prescription for furosemide.  Today reports much improvement since discharge, however requesting refill on furosemide.  Unable to tolerate wearing compression stockings due to a current left heel blister being treated by home health. Also requesting occupational/physical therapy referral, for which order will be placed.  Otherwise patient tolerating procedure and rehab discharge fairly well.  No other acute  complaints today.        MEDICAL HISTORY:    Past Medical History:   Diagnosis Date    Cardiac arrhythmia     Low vitamin D level     Osteoporosis     Other pulmonary embolism without acute cor pulmonale       Past Surgical History:   Procedure Laterality Date    BREAST MASS EXCISION      x3  benign    BREAST SURGERY  79&84&87    Benign tumors    EYE SURGERY  2001    Cataracts    HERNIA REPAIR  1964&1979    Left/right    INTRAMEDULLARY RODDING OF FEMUR Left 11/01/2022    Procedure: INSERTION, INTRAMEDULLARY AMA, FEMUR;  Surgeon: Florentino Friend DO;  Location: [facility];  Service: Orthopedics;  Laterality: Left;  LEFT    LAPAROSCOPIC REPAIR OF INGUINAL HERNIA Bilateral       Social History     Tobacco Use    Smoking status: Never    Smokeless tobacco: Never   Substance Use Topics    Alcohol use: Never    Drug use: Never          Health Maintenance Due   Topic Date Due    Influenza Vaccine (1) Never done    DEXA Scan  01/07/2023          Patient Care Team:  King Duvall MD as PCP - General (Internal Medicine)  King Duvall,  "MD      Review of Systems   Constitutional:  Negative for fatigue and fever.   HENT:  Negative for congestion, rhinorrhea, sore throat and trouble swallowing.    Eyes:  Negative for redness and visual disturbance.   Respiratory:  Negative for cough, chest tightness and shortness of breath.    Cardiovascular:  Positive for leg swelling (improving). Negative for chest pain and palpitations.   Gastrointestinal:  Negative for abdominal pain, constipation, diarrhea, nausea and vomiting.   Genitourinary:  Negative for dysuria, flank pain, frequency and urgency.   Musculoskeletal:  Positive for arthralgias. Negative for gait problem and myalgias.   Skin:  Positive for wound (left heel blister). Negative for rash.   Neurological:  Negative for facial asymmetry, speech difficulty, weakness and headaches.   All other systems reviewed and are negative.    Objective:   BP (P) 124/62 (BP Location: Left arm, Patient Position: Sitting, BP Method: Small (Manual))   Pulse (P) 77   Temp (P) 98.9 °F (37.2 °C) (Temporal)   Ht 5' 7" (1.702 m)   Wt 76.1 kg (167 lb 12.8 oz)   SpO2 (P) 97%   BMI 26.28 kg/m²      Physical Exam  Constitutional:       General: She is not in acute distress.     Appearance: Normal appearance.   HENT:      Right Ear: Tympanic membrane, ear canal and external ear normal.      Left Ear: Tympanic membrane, ear canal and external ear normal.      Nose: Nose normal.      Mouth/Throat:      Mouth: Mucous membranes are moist.      Pharynx: Oropharynx is clear.   Eyes:      Extraocular Movements: Extraocular movements intact.      Conjunctiva/sclera: Conjunctivae normal.      Pupils: Pupils are equal, round, and reactive to light.   Cardiovascular:      Rate and Rhythm: Normal rate and regular rhythm.      Pulses: Normal pulses.      Heart sounds: Normal heart sounds. No murmur heard.    No gallop.   Pulmonary:      Effort: Pulmonary effort is normal.      Breath sounds: Normal breath sounds. No wheezing. "   Abdominal:      General: Bowel sounds are normal. There is no distension.      Palpations: Abdomen is soft. There is no mass.      Tenderness: There is no abdominal tenderness. There is no guarding.   Musculoskeletal:         General: Normal range of motion.      Right lower leg: Edema present.      Left lower leg: Edema present.   Skin:     General: Skin is warm and dry.      Comments: Left heel blister   Neurological:      Mental Status: She is alert. Mental status is at baseline.      Sensory: No sensory deficit.      Motor: No weakness.         Assessment:       ICD-10-CM ICD-9-CM   1. Localized swelling of both lower extremities  M79.89 729.81   2. S/p left hip fracture  Z87.81 V15.51   3. Lack of strength  R53.1 780.79        Plan:     Problem List Items Addressed This Visit          Orthopedic    S/p left hip fracture     Underwent left femur IM nailing (11/01/2022) with subsequent rehab stay 11/3 to 11/17/2022.  -tolerating procedure well  -established with home health and physical therapy, will order OT as well         Relevant Orders    Ambulatory referral/consult to Physical/Occupational Therapy    Localized swelling of both lower extremities - Primary     -much improved postoperatively and after rehab discharge   -encouraged to elevate extremities   -recommended compression stockings, however reports unable to tolerate due to heal blister   -previously on furosemide while in hospital, refill at lower dose on an only as needed         Relevant Medications    furosemide (LASIX) 20 MG tablet     Other Visit Diagnoses       Lack of strength        Relevant Orders    Ambulatory referral/consult to Physical/Occupational Therapy               No follow-ups on file.   -plan specifics discussed above    Orders Placed This Encounter    Ambulatory referral/consult to Physical/Occupational Therapy    furosemide (LASIX) 20 MG tablet        Medication List with Changes/Refills   New Medications    FUROSEMIDE (LASIX)  20 MG TABLET    Take 0.5 tablets (10 mg total) by mouth once daily.   Current Medications    CALCIUM CARBONATE (OS-LEE) 500 MG CALCIUM (1,250 MG) TABLET    Take 1 tablet by mouth 2 (two) times a day.    DENOSUMAB (PROLIA) 60 MG/ML SYRG    Inject 60 mg into the skin.    ELIQUIS 5 MG TAB    Take 1 tablet (5 mg total) by mouth 2 (two) times daily.    GABAPENTIN (NEURONTIN) 100 MG CAPSULE    Take 2 capsules (200 mg total) by mouth 3 (three) times daily.    METHOCARBAMOL (ROBAXIN) 750 MG TAB    Take 500 mg by mouth Daily.    PANTOPRAZOLE (PROTONIX) 40 MG TABLET    Take 1 tablet (40 mg total) by mouth once daily.    TAMSULOSIN (FLOMAX) 0.4 MG CAP    Take 1 capsule (0.4 mg total) by mouth every evening.   Discontinued Medications    FUROSEMIDE (LASIX) 40 MG TABLET    Take 1 tablet (40 mg total) by mouth once daily. for 10 days

## 2022-12-29 PROBLEM — M79.89 LOCALIZED SWELLING OF BOTH LOWER EXTREMITIES: Status: ACTIVE | Noted: 2022-01-01

## 2022-12-29 PROBLEM — Z87.81 S/P LEFT HIP FRACTURE: Status: ACTIVE | Noted: 2022-01-01

## 2022-12-29 NOTE — ASSESSMENT & PLAN NOTE
Underwent left femur IM nailing (11/01/2022) with subsequent rehab stay 11/3 to 11/17/2022.  -tolerating procedure well  -established with home health and physical therapy, will order OT as well

## 2022-12-29 NOTE — ASSESSMENT & PLAN NOTE
-much improved postoperatively and after rehab discharge   -encouraged to elevate extremities   -recommended compression stockings, however reports unable to tolerate due to heal blister   -previously on furosemide while in hospital, refill at lower dose on an only as needed

## 2023-01-01 ENCOUNTER — ANESTHESIA EVENT (OUTPATIENT)
Dept: ENDOSCOPY | Facility: HOSPITAL | Age: 88
End: 2023-01-01
Payer: MEDICARE

## 2023-01-01 ENCOUNTER — OFFICE VISIT (OUTPATIENT)
Dept: INTERNAL MEDICINE | Facility: CLINIC | Age: 88
End: 2023-01-01
Payer: MEDICARE

## 2023-01-01 ENCOUNTER — LAB VISIT (OUTPATIENT)
Dept: LAB | Facility: HOSPITAL | Age: 88
End: 2023-01-01
Attending: INTERNAL MEDICINE
Payer: MEDICARE

## 2023-01-01 ENCOUNTER — TELEPHONE (OUTPATIENT)
Dept: INTERNAL MEDICINE | Facility: CLINIC | Age: 88
End: 2023-01-01
Payer: MEDICARE

## 2023-01-01 ENCOUNTER — PATIENT MESSAGE (OUTPATIENT)
Dept: RESEARCH | Facility: HOSPITAL | Age: 88
End: 2023-01-01
Payer: MEDICARE

## 2023-01-01 ENCOUNTER — DOCUMENT SCAN (OUTPATIENT)
Dept: HOME HEALTH SERVICES | Facility: HOSPITAL | Age: 88
End: 2023-01-01
Payer: MEDICARE

## 2023-01-01 ENCOUNTER — OFFICE VISIT (OUTPATIENT)
Dept: ORTHOPEDICS | Facility: CLINIC | Age: 88
End: 2023-01-01
Payer: MEDICARE

## 2023-01-01 ENCOUNTER — HOSPITAL ENCOUNTER (OUTPATIENT)
Dept: RADIOLOGY | Facility: CLINIC | Age: 88
Discharge: HOME | End: 2023-03-06
Attending: ORTHOPAEDIC SURGERY
Payer: MEDICARE

## 2023-01-01 ENCOUNTER — HOSPITAL ENCOUNTER (OUTPATIENT)
Dept: RADIOLOGY | Facility: HOSPITAL | Age: 88
Discharge: HOME | End: 2023-01-20
Attending: INTERNAL MEDICINE
Payer: MEDICARE

## 2023-01-01 ENCOUNTER — HOSPITAL ENCOUNTER (INPATIENT)
Facility: HOSPITAL | Age: 88
LOS: 1 days | Discharge: HOSPICE | DRG: 871 | End: 2023-12-20
Attending: STUDENT IN AN ORGANIZED HEALTH CARE EDUCATION/TRAINING PROGRAM | Admitting: INTERNAL MEDICINE
Payer: MEDICARE

## 2023-01-01 ENCOUNTER — ANESTHESIA (OUTPATIENT)
Dept: ENDOSCOPY | Facility: HOSPITAL | Age: 88
End: 2023-01-01
Payer: MEDICARE

## 2023-01-01 ENCOUNTER — HOSPITAL ENCOUNTER (OUTPATIENT)
Dept: RADIOLOGY | Facility: CLINIC | Age: 88
Discharge: HOME | End: 2023-06-06
Attending: ORTHOPAEDIC SURGERY
Payer: MEDICARE

## 2023-01-01 ENCOUNTER — INFUSION (OUTPATIENT)
Dept: INFUSION THERAPY | Facility: HOSPITAL | Age: 88
End: 2023-01-01
Attending: INTERNAL MEDICINE
Payer: MEDICARE

## 2023-01-01 ENCOUNTER — HOSPITAL ENCOUNTER (OUTPATIENT)
Dept: RADIOLOGY | Facility: CLINIC | Age: 88
Discharge: HOME | End: 2023-01-09
Attending: ORTHOPAEDIC SURGERY
Payer: MEDICARE

## 2023-01-01 ENCOUNTER — PATIENT MESSAGE (OUTPATIENT)
Dept: ADMINISTRATIVE | Facility: OTHER | Age: 88
End: 2023-01-01
Payer: MEDICARE

## 2023-01-01 ENCOUNTER — HOSPITAL ENCOUNTER (OUTPATIENT)
Facility: HOSPITAL | Age: 88
Discharge: HOME | End: 2023-09-21
Attending: INTERNAL MEDICINE | Admitting: INTERNAL MEDICINE
Payer: MEDICARE

## 2023-01-01 VITALS
DIASTOLIC BLOOD PRESSURE: 68 MMHG | BODY MASS INDEX: 25.33 KG/M2 | OXYGEN SATURATION: 98 % | WEIGHT: 161.38 LBS | HEIGHT: 67 IN | SYSTOLIC BLOOD PRESSURE: 134 MMHG | HEART RATE: 72 BPM

## 2023-01-01 VITALS
SYSTOLIC BLOOD PRESSURE: 128 MMHG | OXYGEN SATURATION: 99 % | DIASTOLIC BLOOD PRESSURE: 66 MMHG | HEIGHT: 67 IN | HEART RATE: 69 BPM | BODY MASS INDEX: 25.87 KG/M2 | WEIGHT: 164.81 LBS

## 2023-01-01 VITALS
SYSTOLIC BLOOD PRESSURE: 66 MMHG | BODY MASS INDEX: 25.37 KG/M2 | WEIGHT: 162 LBS | OXYGEN SATURATION: 96 % | HEART RATE: 81 BPM | TEMPERATURE: 99 F | DIASTOLIC BLOOD PRESSURE: 37 MMHG | RESPIRATION RATE: 20 BRPM

## 2023-01-01 VITALS
BODY MASS INDEX: 26.37 KG/M2 | HEART RATE: 62 BPM | HEIGHT: 67 IN | WEIGHT: 168 LBS | SYSTOLIC BLOOD PRESSURE: 121 MMHG | RESPIRATION RATE: 18 BRPM | DIASTOLIC BLOOD PRESSURE: 73 MMHG | TEMPERATURE: 98 F

## 2023-01-01 VITALS
BODY MASS INDEX: 25.11 KG/M2 | DIASTOLIC BLOOD PRESSURE: 62 MMHG | WEIGHT: 160 LBS | SYSTOLIC BLOOD PRESSURE: 118 MMHG | HEIGHT: 67 IN | HEART RATE: 70 BPM | OXYGEN SATURATION: 96 %

## 2023-01-01 VITALS
HEART RATE: 57 BPM | HEIGHT: 68 IN | SYSTOLIC BLOOD PRESSURE: 126 MMHG | WEIGHT: 165 LBS | SYSTOLIC BLOOD PRESSURE: 137 MMHG | WEIGHT: 165 LBS | DIASTOLIC BLOOD PRESSURE: 60 MMHG | HEART RATE: 77 BPM | TEMPERATURE: 98 F | RESPIRATION RATE: 18 BRPM | BODY MASS INDEX: 25.84 KG/M2 | BODY MASS INDEX: 25.01 KG/M2 | OXYGEN SATURATION: 100 % | DIASTOLIC BLOOD PRESSURE: 72 MMHG | OXYGEN SATURATION: 99 % | TEMPERATURE: 99 F | RESPIRATION RATE: 17 BRPM

## 2023-01-01 VITALS
WEIGHT: 167.75 LBS | SYSTOLIC BLOOD PRESSURE: 138 MMHG | HEIGHT: 67 IN | BODY MASS INDEX: 26.33 KG/M2 | HEART RATE: 68 BPM | DIASTOLIC BLOOD PRESSURE: 73 MMHG | RESPIRATION RATE: 18 BRPM

## 2023-01-01 VITALS
SYSTOLIC BLOOD PRESSURE: 146 MMHG | TEMPERATURE: 97 F | WEIGHT: 161.38 LBS | DIASTOLIC BLOOD PRESSURE: 75 MMHG | BODY MASS INDEX: 25.28 KG/M2 | HEART RATE: 77 BPM

## 2023-01-01 VITALS
OXYGEN SATURATION: 98 % | HEART RATE: 67 BPM | SYSTOLIC BLOOD PRESSURE: 118 MMHG | DIASTOLIC BLOOD PRESSURE: 60 MMHG | HEIGHT: 68 IN | WEIGHT: 161.19 LBS | BODY MASS INDEX: 24.43 KG/M2

## 2023-01-01 VITALS
WEIGHT: 161 LBS | BODY MASS INDEX: 25.27 KG/M2 | SYSTOLIC BLOOD PRESSURE: 124 MMHG | TEMPERATURE: 97 F | HEART RATE: 84 BPM | DIASTOLIC BLOOD PRESSURE: 75 MMHG | HEIGHT: 67 IN

## 2023-01-01 VITALS
DIASTOLIC BLOOD PRESSURE: 68 MMHG | HEART RATE: 74 BPM | SYSTOLIC BLOOD PRESSURE: 155 MMHG | TEMPERATURE: 98 F | RESPIRATION RATE: 16 BRPM

## 2023-01-01 DIAGNOSIS — R94.5 ABNORMAL LIVER FUNCTION: ICD-10-CM

## 2023-01-01 DIAGNOSIS — D37.6 NEOPLASM OF UNCERTAIN BEHAVIOR OF LIVER, GALLBLADDER AND BILE DUCTS: ICD-10-CM

## 2023-01-01 DIAGNOSIS — K86.89 PANCREATIC MASS: ICD-10-CM

## 2023-01-01 DIAGNOSIS — S72.142D CLOSED COMMINUTED INTERTROCHANTERIC FRACTURE OF LEFT FEMUR WITH ROUTINE HEALING: ICD-10-CM

## 2023-01-01 DIAGNOSIS — C25.9 MALIGNANT NEOPLASM OF PANCREAS, UNSPECIFIED LOCATION OF MALIGNANCY: ICD-10-CM

## 2023-01-01 DIAGNOSIS — M81.0 OSTEOPOROSIS: ICD-10-CM

## 2023-01-01 DIAGNOSIS — R54 FRAILTY: ICD-10-CM

## 2023-01-01 DIAGNOSIS — K86.89 PANCREATIC MASS: Primary | ICD-10-CM

## 2023-01-01 DIAGNOSIS — R19.7 DIARRHEA, UNSPECIFIED TYPE: Primary | ICD-10-CM

## 2023-01-01 DIAGNOSIS — R53.1 WEAKNESS: ICD-10-CM

## 2023-01-01 DIAGNOSIS — I82.5Z2 CHRONIC DEEP VEIN THROMBOSIS (DVT) OF DISTAL VEIN OF LEFT LOWER EXTREMITY: Chronic | ICD-10-CM

## 2023-01-01 DIAGNOSIS — I27.82 OTHER CHRONIC PULMONARY EMBOLISM WITHOUT ACUTE COR PULMONALE: ICD-10-CM

## 2023-01-01 DIAGNOSIS — I49.9 CARDIAC ARRHYTHMIA, UNSPECIFIED CARDIAC ARRHYTHMIA TYPE: ICD-10-CM

## 2023-01-01 DIAGNOSIS — K83.1 BILE DUCT OBSTRUCTION: ICD-10-CM

## 2023-01-01 DIAGNOSIS — Z00.00 WELLNESS EXAMINATION: Primary | ICD-10-CM

## 2023-01-01 DIAGNOSIS — M81.0 AGE RELATED OSTEOPOROSIS, UNSPECIFIED PATHOLOGICAL FRACTURE PRESENCE: Primary | ICD-10-CM

## 2023-01-01 DIAGNOSIS — I27.82 OTHER CHRONIC PULMONARY EMBOLISM WITHOUT ACUTE COR PULMONALE: Chronic | ICD-10-CM

## 2023-01-01 DIAGNOSIS — R53.1 GENERALIZED WEAKNESS: Primary | ICD-10-CM

## 2023-01-01 DIAGNOSIS — K21.9 GASTROESOPHAGEAL REFLUX DISEASE, UNSPECIFIED WHETHER ESOPHAGITIS PRESENT: Chronic | ICD-10-CM

## 2023-01-01 DIAGNOSIS — S72.142D CLOSED COMMINUTED INTERTROCHANTERIC FRACTURE OF LEFT FEMUR WITH ROUTINE HEALING: Primary | ICD-10-CM

## 2023-01-01 DIAGNOSIS — R79.89 LOW VITAMIN D LEVEL: Chronic | ICD-10-CM

## 2023-01-01 DIAGNOSIS — M81.0 OSTEOPOROSIS, UNSPECIFIED OSTEOPOROSIS TYPE, UNSPECIFIED PATHOLOGICAL FRACTURE PRESENCE: Primary | ICD-10-CM

## 2023-01-01 DIAGNOSIS — J18.9 PNEUMONIA OF RIGHT LOWER LOBE DUE TO INFECTIOUS ORGANISM: ICD-10-CM

## 2023-01-01 DIAGNOSIS — N30.00 ACUTE CYSTITIS WITHOUT HEMATURIA: ICD-10-CM

## 2023-01-01 DIAGNOSIS — R17 JAUNDICE: ICD-10-CM

## 2023-01-01 DIAGNOSIS — R19.7 DIARRHEA, UNSPECIFIED TYPE: ICD-10-CM

## 2023-01-01 DIAGNOSIS — M81.0 OSTEOPOROSIS, UNSPECIFIED OSTEOPOROSIS TYPE, UNSPECIFIED PATHOLOGICAL FRACTURE PRESENCE: ICD-10-CM

## 2023-01-01 DIAGNOSIS — Z79.899 MEDICATION MANAGEMENT: Primary | ICD-10-CM

## 2023-01-01 DIAGNOSIS — K80.10 CALCULUS OF GALLBLADDER WITH CHRONIC CHOLECYSTITIS WITHOUT OBSTRUCTION: ICD-10-CM

## 2023-01-01 DIAGNOSIS — M81.0 AGE RELATED OSTEOPOROSIS, UNSPECIFIED PATHOLOGICAL FRACTURE PRESENCE: Primary | Chronic | ICD-10-CM

## 2023-01-01 DIAGNOSIS — E04.1 NONTOXIC SINGLE THYROID NODULE: ICD-10-CM

## 2023-01-01 DIAGNOSIS — M81.0 AGE RELATED OSTEOPOROSIS, UNSPECIFIED PATHOLOGICAL FRACTURE PRESENCE: Chronic | ICD-10-CM

## 2023-01-01 DIAGNOSIS — Z87.81 S/P LEFT HIP FRACTURE: ICD-10-CM

## 2023-01-01 LAB
ACINETOBACTER CALCOACETICUS-BAUMANNII COMPLEX (OHS): NOT DETECTED
ALBUMIN SERPL-MCNC: 1.7 G/DL (ref 3.4–4.8)
ALBUMIN SERPL-MCNC: 4 G/DL (ref 3.4–4.8)
ALBUMIN/GLOB SERPL: 0.5 RATIO (ref 1.1–2)
ALBUMIN/GLOB SERPL: 1.6 RATIO (ref 1.1–2)
ALP SERPL-CCNC: 288 UNIT/L (ref 40–150)
ALP SERPL-CCNC: 339 UNIT/L (ref 40–150)
ALT SERPL-CCNC: 240 UNIT/L (ref 0–55)
ALT SERPL-CCNC: 46 UNIT/L (ref 0–55)
ALT SERPL-CCNC: 95 UNIT/L (ref 0–55)
AMORPH URATE CRY URNS QL MICRO: ABNORMAL /UL
APPEARANCE UR: ABNORMAL
AST SERPL-CCNC: 101 UNIT/L (ref 5–34)
AST SERPL-CCNC: 119 UNIT/L (ref 5–34)
AST SERPL-CCNC: 23 UNIT/L (ref 5–34)
BACTERIA #/AREA URNS AUTO: ABNORMAL /HPF
BACTERIA BLD CULT: ABNORMAL
BACTERIA BLD CULT: ABNORMAL
BACTERIA UR CULT: ABNORMAL
BACTEROIDES FRAGILIS (OHS): NOT DETECTED
BASOPHILS # BLD AUTO: 0.04 X10(3)/MCL
BASOPHILS # BLD AUTO: 0.07 X10(3)/MCL
BASOPHILS NFR BLD AUTO: 0.4 %
BASOPHILS NFR BLD AUTO: 0.5 %
BILIRUB SERPL-MCNC: 1.4 MG/DL
BILIRUB SERPL-MCNC: 35.6 MG/DL
BILIRUB UR QL STRIP.AUTO: ABNORMAL
BNP BLD-MCNC: 240.5 PG/ML
BUN SERPL-MCNC: 120.6 MG/DL (ref 9.8–20.1)
BUN SERPL-MCNC: 41.9 MG/DL (ref 9.8–20.1)
C AURIS DNA BLD POS QL NAA+NON-PROBE: NOT DETECTED
C GATTII+NEOFOR DNA CSF QL NAA+NON-PROBE: NOT DETECTED
CALCIUM SERPL-MCNC: 10 MG/DL (ref 8.4–10.2)
CALCIUM SERPL-MCNC: 9.3 MG/DL (ref 8.4–10.2)
CANCER AG19-9 SERPL-ACNC: 1606.01 UNIT/ML (ref 0–37)
CANDIDA ALBICANS (OHS): NOT DETECTED
CANDIDA GLABRATA (OHS): NOT DETECTED
CANDIDA KRUSEI (OHS): NOT DETECTED
CANDIDA PARAPSILOSIS (OHS): NOT DETECTED
CANDIDA TROPICALIS (OHS): NOT DETECTED
CAOX CRY URNS QL MICRO: ABNORMAL /HPF
CHLORIDE SERPL-SCNC: 103 MMOL/L (ref 98–107)
CHLORIDE SERPL-SCNC: 93 MMOL/L (ref 98–107)
CO2 SERPL-SCNC: 18 MMOL/L (ref 23–31)
CO2 SERPL-SCNC: 23 MMOL/L (ref 23–31)
COLOR UR AUTO: ABNORMAL
CREAT SERPL-MCNC: 0.82 MG/DL (ref 0.55–1.02)
CREAT SERPL-MCNC: 1.19 MG/DL (ref 0.55–1.02)
CREAT SERPL-MCNC: 3.34 MG/DL (ref 0.55–1.02)
CTX-M (OHS): NOT DETECTED
ENTEROBACTER CLOACAE COMPLEX (OHS): NOT DETECTED
ENTEROBACTERALES (OHS): DETECTED
ENTEROCOCCUS FAECALIS (OHS): NOT DETECTED
ENTEROCOCCUS FAECIUM (OHS): NOT DETECTED
EOSINOPHIL # BLD AUTO: 0.01 X10(3)/MCL (ref 0–0.9)
EOSINOPHIL # BLD AUTO: 0.12 X10(3)/MCL (ref 0–0.9)
EOSINOPHIL NFR BLD AUTO: 0.1 %
EOSINOPHIL NFR BLD AUTO: 1.5 %
ERYTHROCYTE [DISTWIDTH] IN BLOOD BY AUTOMATED COUNT: 12.7 % (ref 11.5–17)
ERYTHROCYTE [DISTWIDTH] IN BLOOD BY AUTOMATED COUNT: 17.2 % (ref 11.5–17)
ESCHERICHIA COLI (OHS): DETECTED
FLUAV AG UPPER RESP QL IA.RAPID: NOT DETECTED
FLUBV AG UPPER RESP QL IA.RAPID: NOT DETECTED
GFR SERPLBLD CREATININE-BSD FMLA CKD-EPI: 13 MLS/MIN/1.73/M2
GFR SERPLBLD CREATININE-BSD FMLA CKD-EPI: 44 MLS/MIN/1.73/M2
GFR SERPLBLD CREATININE-BSD FMLA CKD-EPI: >60 MLS/MIN/1.73/M2
GGT SERPL-CCNC: 860 U/L (ref 9–36)
GLOBULIN SER-MCNC: 2.5 GM/DL (ref 2.4–3.5)
GLOBULIN SER-MCNC: 3.5 GM/DL (ref 2.4–3.5)
GLUCOSE SERPL-MCNC: 102 MG/DL (ref 82–115)
GLUCOSE SERPL-MCNC: 91 MG/DL (ref 82–115)
GLUCOSE UR QL STRIP.AUTO: NORMAL
GP B STREP DNA CSF QL NAA+NON-PROBE: NOT DETECTED
GRAM STN SPEC: ABNORMAL
GRAN CASTS #/AREA URNS LPF: ABNORMAL /LPF
HAEM INFLU DNA CSF QL NAA+NON-PROBE: NOT DETECTED
HAV IGM SERPL QL IA: NONREACTIVE
HBV CORE IGM SERPL QL IA: NONREACTIVE
HBV SURFACE AG SERPL QL IA: NONREACTIVE
HCT VFR BLD AUTO: 33.4 % (ref 37–47)
HCT VFR BLD AUTO: 35.7 % (ref 37–47)
HCV AB SERPL QL IA: NONREACTIVE
HGB BLD-MCNC: 11.9 G/DL (ref 12–16)
HGB BLD-MCNC: 12 G/DL (ref 12–16)
HYALINE CASTS #/AREA URNS LPF: ABNORMAL /LPF
IMM GRANULOCYTES # BLD AUTO: 0.03 X10(3)/MCL (ref 0–0.04)
IMM GRANULOCYTES # BLD AUTO: 0.36 X10(3)/MCL (ref 0–0.04)
IMM GRANULOCYTES NFR BLD AUTO: 0.4 %
IMM GRANULOCYTES NFR BLD AUTO: 2 %
IMP (OHS): NOT DETECTED
IRON SATN MFR SERPL: 43 % (ref 20–50)
IRON SERPL-MCNC: 96 UG/DL (ref 50–170)
KETONES UR QL STRIP.AUTO: NEGATIVE
KLEBSIELLA AEROGENES (OHS): NOT DETECTED
KLEBSIELLA OXYTOCA (OHS): NOT DETECTED
KLEBSIELLA PNEUMONIAE GROUP (OHS): NOT DETECTED
KPC (OHS): NOT DETECTED
L MONOCYTOG DNA CSF QL NAA+NON-PROBE: NOT DETECTED
LACTATE SERPL-SCNC: 10.1 MMOL/L (ref 0.5–2.2)
LACTATE SERPL-SCNC: 2.2 MMOL/L (ref 0.5–2.2)
LEUKOCYTE ESTERASE UR QL STRIP.AUTO: 250
LYMPHOCYTES # BLD AUTO: 0.63 X10(3)/MCL (ref 0.6–4.6)
LYMPHOCYTES # BLD AUTO: 1.93 X10(3)/MCL (ref 0.6–4.6)
LYMPHOCYTES NFR BLD AUTO: 24.8 %
LYMPHOCYTES NFR BLD AUTO: 3.5 %
MAGNESIUM SERPL-MCNC: 2.3 MG/DL (ref 1.6–2.6)
MCH RBC QN AUTO: 31.4 PG (ref 27–31)
MCH RBC QN AUTO: 32.2 PG (ref 27–31)
MCHC RBC AUTO-ENTMCNC: 33.6 G/DL (ref 33–36)
MCHC RBC AUTO-ENTMCNC: 35.6 G/DL (ref 33–36)
MCR-1 (OHS): NOT DETECTED
MCV RBC AUTO: 90.5 FL (ref 80–94)
MCV RBC AUTO: 93.5 FL (ref 80–94)
MECA/C (OHS): ABNORMAL
MECA/C AND MREJ (MRSA)(OHS): ABNORMAL
MONOCYTES # BLD AUTO: 0.69 X10(3)/MCL (ref 0.1–1.3)
MONOCYTES # BLD AUTO: 1.04 X10(3)/MCL (ref 0.1–1.3)
MONOCYTES NFR BLD AUTO: 13.4 %
MONOCYTES NFR BLD AUTO: 3.8 %
MUCOUS THREADS URNS QL MICRO: ABNORMAL /LPF
N MEN DNA CSF QL NAA+NON-PROBE: NOT DETECTED
NDM (OHS): NOT DETECTED
NEUTROPHILS # BLD AUTO: 16.46 X10(3)/MCL (ref 2.1–9.2)
NEUTROPHILS # BLD AUTO: 4.61 X10(3)/MCL (ref 2.1–9.2)
NEUTROPHILS NFR BLD AUTO: 59.4 %
NEUTROPHILS NFR BLD AUTO: 90.2 %
NITRITE UR QL STRIP.AUTO: NEGATIVE
NRBC BLD AUTO-RTO: 0 %
NRBC BLD AUTO-RTO: 0.1 %
OXA-48-LIKE (OHS): NOT DETECTED
PH UR STRIP.AUTO: 5.5 [PH]
PLATELET # BLD AUTO: 165 X10(3)/MCL (ref 130–400)
PLATELET # BLD AUTO: 261 X10(3)/MCL (ref 130–400)
PLATELETS.RETICULATED NFR BLD AUTO: 12.3 % (ref 0.9–11.2)
PMV BLD AUTO: 10.9 FL (ref 7.4–10.4)
PMV BLD AUTO: 12.8 FL (ref 7.4–10.4)
POTASSIUM SERPL-SCNC: 3.7 MMOL/L (ref 3.5–5.1)
POTASSIUM SERPL-SCNC: 4 MMOL/L (ref 3.5–5.1)
PROT SERPL-MCNC: 5.2 GM/DL (ref 5.8–7.6)
PROT SERPL-MCNC: 6.5 GM/DL (ref 5.8–7.6)
PROT UR QL STRIP.AUTO: ABNORMAL
PROTEUS SPP. (OHS): NOT DETECTED
PSEUDOMONAS AERUGINOSA (OHS): NOT DETECTED
RBC # BLD AUTO: 3.69 X10(6)/MCL (ref 4.2–5.4)
RBC # BLD AUTO: 3.82 X10(6)/MCL (ref 4.2–5.4)
RBC #/AREA URNS AUTO: ABNORMAL /HPF
RBC UR QL AUTO: ABNORMAL
S ENT+BONG DNA STL QL NAA+NON-PROBE: NOT DETECTED
S PNEUM DNA CSF QL NAA+NON-PROBE: NOT DETECTED
SARS-COV-2 RNA RESP QL NAA+PROBE: NOT DETECTED
SERRATIA MARCESCENS (OHS): NOT DETECTED
SODIUM SERPL-SCNC: 128 MMOL/L (ref 136–145)
SODIUM SERPL-SCNC: 139 MMOL/L (ref 136–145)
SP GR UR STRIP.AUTO: 1.02 (ref 1–1.03)
SQUAMOUS #/AREA URNS LPF: ABNORMAL /HPF
STAPHYLOCOCCUS AUREUS (OHS): NOT DETECTED
STAPHYLOCOCCUS EPIDERMIDIS (OHS): NOT DETECTED
STAPHYLOCOCCUS LUGDUNENSIS (OHS): NOT DETECTED
STAPHYLOCOCCUS SPP. (OHS): NOT DETECTED
STENOTROPHOMONAS MALTOPHILIA (OHS): NOT DETECTED
STREPTOCOCCUS PYOGENES (GROUP A)(OHS): NOT DETECTED
STREPTOCOCCUS SPP. (OHS): NOT DETECTED
TIBC SERPL-MCNC: 129 UG/DL (ref 70–310)
TIBC SERPL-MCNC: 225 UG/DL (ref 250–450)
TRANSFERRIN SERPL-MCNC: 192 MG/DL
TROPONIN I SERPL-MCNC: 0.03 NG/ML (ref 0–0.04)
UROBILINOGEN UR STRIP-ACNC: NORMAL
VANA/B (OHS): ABNORMAL
VIM (OHS): NOT DETECTED
WBC # SPEC AUTO: 18.22 X10(3)/MCL (ref 4.5–11.5)
WBC # SPEC AUTO: 7.77 X10(3)/MCL (ref 4.5–11.5)
WBC #/AREA URNS AUTO: ABNORMAL /HPF
WBC CLUMPS UR QL AUTO: ABNORMAL

## 2023-01-01 PROCEDURE — 87186 SC STD MICRODIL/AGAR DIL: CPT | Performed by: STUDENT IN AN ORGANIZED HEALTH CARE EDUCATION/TRAINING PROGRAM

## 2023-01-01 PROCEDURE — 99285 EMERGENCY DEPT VISIT HI MDM: CPT | Mod: 25

## 2023-01-01 PROCEDURE — 99215 OFFICE O/P EST HI 40 MIN: CPT | Mod: ,,,

## 2023-01-01 PROCEDURE — 36415 COLL VENOUS BLD VENIPUNCTURE: CPT

## 2023-01-01 PROCEDURE — 99213 OFFICE O/P EST LOW 20 MIN: CPT | Mod: ,,, | Performed by: ORTHOPAEDIC SURGERY

## 2023-01-01 PROCEDURE — 99214 OFFICE O/P EST MOD 30 MIN: CPT | Mod: ,,, | Performed by: INTERNAL MEDICINE

## 2023-01-01 PROCEDURE — 25000003 PHARM REV CODE 250: Performed by: STUDENT IN AN ORGANIZED HEALTH CARE EDUCATION/TRAINING PROGRAM

## 2023-01-01 PROCEDURE — 86709 HEPATITIS A IGM ANTIBODY: CPT

## 2023-01-01 PROCEDURE — 87040 BLOOD CULTURE FOR BACTERIA: CPT | Performed by: STUDENT IN AN ORGANIZED HEALTH CARE EDUCATION/TRAINING PROGRAM

## 2023-01-01 PROCEDURE — 96372 THER/PROPH/DIAG INJ SC/IM: CPT

## 2023-01-01 PROCEDURE — 83550 IRON BINDING TEST: CPT

## 2023-01-01 PROCEDURE — 73552 X-RAY EXAM OF FEMUR 2/>: CPT | Mod: LT,,, | Performed by: ORTHOPAEDIC SURGERY

## 2023-01-01 PROCEDURE — 25000003 PHARM REV CODE 250

## 2023-01-01 PROCEDURE — 99213 PR OFFICE/OUTPT VISIT, EST, LEVL III, 20-29 MIN: ICD-10-PCS | Mod: ,,, | Performed by: INTERNAL MEDICINE

## 2023-01-01 PROCEDURE — 83880 ASSAY OF NATRIURETIC PEPTIDE: CPT | Performed by: STUDENT IN AN ORGANIZED HEALTH CARE EDUCATION/TRAINING PROGRAM

## 2023-01-01 PROCEDURE — 81001 URINALYSIS AUTO W/SCOPE: CPT | Performed by: STUDENT IN AN ORGANIZED HEALTH CARE EDUCATION/TRAINING PROGRAM

## 2023-01-01 PROCEDURE — 63600175 PHARM REV CODE 636 W HCPCS

## 2023-01-01 PROCEDURE — 99213 PR OFFICE/OUTPT VISIT, EST, LEVL III, 20-29 MIN: ICD-10-PCS | Mod: ,,, | Performed by: ORTHOPAEDIC SURGERY

## 2023-01-01 PROCEDURE — D9220A PRA ANESTHESIA: Mod: CRNA,,, | Performed by: NURSE ANESTHETIST, CERTIFIED REGISTERED

## 2023-01-01 PROCEDURE — 94761 N-INVAS EAR/PLS OXIMETRY MLT: CPT

## 2023-01-01 PROCEDURE — 73552 XR FEMUR 2 VIEW LEFT: ICD-10-PCS | Mod: LT,,, | Performed by: ORTHOPAEDIC SURGERY

## 2023-01-01 PROCEDURE — 77081 DXA BONE DENSITY APPENDICULR: CPT | Mod: 26,59,, | Performed by: RADIOLOGY

## 2023-01-01 PROCEDURE — 85025 COMPLETE CBC W/AUTO DIFF WBC: CPT | Performed by: STUDENT IN AN ORGANIZED HEALTH CARE EDUCATION/TRAINING PROGRAM

## 2023-01-01 PROCEDURE — 37000008 HC ANESTHESIA 1ST 15 MINUTES: Performed by: INTERNAL MEDICINE

## 2023-01-01 PROCEDURE — 99213 OFFICE O/P EST LOW 20 MIN: CPT | Mod: ,,, | Performed by: INTERNAL MEDICINE

## 2023-01-01 PROCEDURE — 86301 IMMUNOASSAY TUMOR CA 19-9: CPT

## 2023-01-01 PROCEDURE — C1726 CATH, BAL DIL, NON-VASCULAR: HCPCS | Performed by: INTERNAL MEDICINE

## 2023-01-01 PROCEDURE — 43245 EGD DILATE STRICTURE: CPT | Performed by: INTERNAL MEDICINE

## 2023-01-01 PROCEDURE — 27000221 HC OXYGEN, UP TO 24 HOURS

## 2023-01-01 PROCEDURE — 84450 TRANSFERASE (AST) (SGOT): CPT

## 2023-01-01 PROCEDURE — D9220A PRA ANESTHESIA: ICD-10-PCS | Mod: CRNA,,, | Performed by: NURSE ANESTHETIST, CERTIFIED REGISTERED

## 2023-01-01 PROCEDURE — 87340 HEPATITIS B SURFACE AG IA: CPT

## 2023-01-01 PROCEDURE — 80053 COMPREHEN METABOLIC PANEL: CPT | Performed by: STUDENT IN AN ORGANIZED HEALTH CARE EDUCATION/TRAINING PROGRAM

## 2023-01-01 PROCEDURE — 27201423 OPTIME MED/SURG SUP & DEVICES STERILE SUPPLY: Performed by: INTERNAL MEDICINE

## 2023-01-01 PROCEDURE — 99214 PR OFFICE/OUTPT VISIT, EST, LEVL IV, 30-39 MIN: ICD-10-PCS | Mod: ,,, | Performed by: INTERNAL MEDICINE

## 2023-01-01 PROCEDURE — 96367 TX/PROPH/DG ADDL SEQ IV INF: CPT

## 2023-01-01 PROCEDURE — 77080 DXA BONE DENSITY AXIAL: CPT | Mod: TC

## 2023-01-01 PROCEDURE — 20000000 HC ICU ROOM

## 2023-01-01 PROCEDURE — 96361 HYDRATE IV INFUSION ADD-ON: CPT

## 2023-01-01 PROCEDURE — G0439 PPPS, SUBSEQ VISIT: HCPCS | Mod: ,,, | Performed by: INTERNAL MEDICINE

## 2023-01-01 PROCEDURE — 80053 COMPREHEN METABOLIC PANEL: CPT

## 2023-01-01 PROCEDURE — 99215 PR OFFICE/OUTPT VISIT, EST, LEVL V, 40-54 MIN: ICD-10-PCS | Mod: ,,,

## 2023-01-01 PROCEDURE — 84484 ASSAY OF TROPONIN QUANT: CPT | Performed by: STUDENT IN AN ORGANIZED HEALTH CARE EDUCATION/TRAINING PROGRAM

## 2023-01-01 PROCEDURE — 83735 ASSAY OF MAGNESIUM: CPT | Performed by: STUDENT IN AN ORGANIZED HEALTH CARE EDUCATION/TRAINING PROGRAM

## 2023-01-01 PROCEDURE — 86709 HEPATITIS A IGM ANTIBODY: CPT | Mod: 91

## 2023-01-01 PROCEDURE — 63600175 PHARM REV CODE 636 W HCPCS: Mod: JZ,JG | Performed by: INTERNAL MEDICINE

## 2023-01-01 PROCEDURE — 87154 CUL TYP ID BLD PTHGN 6+ TRGT: CPT | Performed by: STUDENT IN AN ORGANIZED HEALTH CARE EDUCATION/TRAINING PROGRAM

## 2023-01-01 PROCEDURE — D9220A PRA ANESTHESIA: ICD-10-PCS | Mod: ANES,,, | Performed by: ANESTHESIOLOGY

## 2023-01-01 PROCEDURE — 77081 DEXA BONE DENSITY APPENDICULAR SKELETON: ICD-10-PCS | Mod: 26,59,, | Performed by: RADIOLOGY

## 2023-01-01 PROCEDURE — 99213 PR OFFICE/OUTPT VISIT, EST, LEVL III, 20-29 MIN: ICD-10-PCS | Mod: ,,, | Performed by: PHYSICIAN ASSISTANT

## 2023-01-01 PROCEDURE — 99024 POSTOP FOLLOW-UP VISIT: CPT | Mod: POP,,, | Performed by: PHYSICIAN ASSISTANT

## 2023-01-01 PROCEDURE — 77080 DEXA BONE DENSITY SPINE HIP: ICD-10-PCS | Mod: 26,,, | Performed by: RADIOLOGY

## 2023-01-01 PROCEDURE — 85025 COMPLETE CBC W/AUTO DIFF WBC: CPT

## 2023-01-01 PROCEDURE — 82977 ASSAY OF GGT: CPT

## 2023-01-01 PROCEDURE — 96365 THER/PROPH/DIAG IV INF INIT: CPT

## 2023-01-01 PROCEDURE — 87086 URINE CULTURE/COLONY COUNT: CPT | Performed by: STUDENT IN AN ORGANIZED HEALTH CARE EDUCATION/TRAINING PROGRAM

## 2023-01-01 PROCEDURE — 77080 DXA BONE DENSITY AXIAL: CPT | Mod: 26,,, | Performed by: RADIOLOGY

## 2023-01-01 PROCEDURE — 63600175 PHARM REV CODE 636 W HCPCS: Mod: JG | Performed by: INTERNAL MEDICINE

## 2023-01-01 PROCEDURE — 87077 CULTURE AEROBIC IDENTIFY: CPT | Performed by: STUDENT IN AN ORGANIZED HEALTH CARE EDUCATION/TRAINING PROGRAM

## 2023-01-01 PROCEDURE — G0439 PR MEDICARE ANNUAL WELLNESS SUBSEQUENT VISIT: ICD-10-PCS | Mod: ,,, | Performed by: INTERNAL MEDICINE

## 2023-01-01 PROCEDURE — 86803 HEPATITIS C AB TEST: CPT

## 2023-01-01 PROCEDURE — 84460 ALANINE AMINO (ALT) (SGPT): CPT

## 2023-01-01 PROCEDURE — 74328 X-RAY BILE DUCT ENDOSCOPY: CPT | Mod: TC | Performed by: INTERNAL MEDICINE

## 2023-01-01 PROCEDURE — 37000009 HC ANESTHESIA EA ADD 15 MINS: Performed by: INTERNAL MEDICINE

## 2023-01-01 PROCEDURE — C1769 GUIDE WIRE: HCPCS | Performed by: INTERNAL MEDICINE

## 2023-01-01 PROCEDURE — 83605 ASSAY OF LACTIC ACID: CPT | Performed by: STUDENT IN AN ORGANIZED HEALTH CARE EDUCATION/TRAINING PROGRAM

## 2023-01-01 PROCEDURE — 99024 PR POST-OP FOLLOW-UP VISIT: ICD-10-PCS | Mod: POP,,, | Performed by: PHYSICIAN ASSISTANT

## 2023-01-01 PROCEDURE — D9220A PRA ANESTHESIA: Mod: ANES,,, | Performed by: ANESTHESIOLOGY

## 2023-01-01 PROCEDURE — 63600175 PHARM REV CODE 636 W HCPCS: Performed by: STUDENT IN AN ORGANIZED HEALTH CARE EDUCATION/TRAINING PROGRAM

## 2023-01-01 PROCEDURE — 0240U COVID/FLU A&B PCR: CPT | Performed by: STUDENT IN AN ORGANIZED HEALTH CARE EDUCATION/TRAINING PROGRAM

## 2023-01-01 PROCEDURE — 82565 ASSAY OF CREATININE: CPT

## 2023-01-01 PROCEDURE — 77081 DXA BONE DENSITY APPENDICULR: CPT | Mod: TC,59

## 2023-01-01 PROCEDURE — 99213 OFFICE O/P EST LOW 20 MIN: CPT | Mod: ,,, | Performed by: PHYSICIAN ASSISTANT

## 2023-01-01 RX ORDER — ONDANSETRON 2 MG/ML
INJECTION INTRAMUSCULAR; INTRAVENOUS
Status: DISCONTINUED | OUTPATIENT
Start: 2023-01-01 | End: 2023-01-01

## 2023-01-01 RX ORDER — MIDAZOLAM HYDROCHLORIDE 1 MG/ML
2 INJECTION INTRAMUSCULAR; INTRAVENOUS ONCE AS NEEDED
Status: CANCELLED | OUTPATIENT
Start: 2023-01-01 | End: 2035-02-17

## 2023-01-01 RX ORDER — NOREPINEPHRINE BITARTRATE/D5W 8 MG/250ML
0-3 PLASTIC BAG, INJECTION (ML) INTRAVENOUS CONTINUOUS
Status: DISCONTINUED | OUTPATIENT
Start: 2023-01-01 | End: 2023-01-01

## 2023-01-01 RX ORDER — MORPHINE SULFATE 4 MG/ML
2 INJECTION, SOLUTION INTRAMUSCULAR; INTRAVENOUS EVERY 4 HOURS PRN
Status: DISCONTINUED | OUTPATIENT
Start: 2023-01-01 | End: 2023-01-01 | Stop reason: HOSPADM

## 2023-01-01 RX ORDER — ALBUMIN HUMAN 50 G/1000ML
50 SOLUTION INTRAVENOUS ONCE
Status: DISCONTINUED | OUTPATIENT
Start: 2023-01-01 | End: 2023-01-01

## 2023-01-01 RX ORDER — LORAZEPAM 2 MG/ML
1 INJECTION INTRAMUSCULAR EVERY 4 HOURS PRN
Status: DISCONTINUED | OUTPATIENT
Start: 2023-01-01 | End: 2023-01-01 | Stop reason: HOSPADM

## 2023-01-01 RX ORDER — DEXAMETHASONE SODIUM PHOSPHATE 4 MG/ML
INJECTION, SOLUTION INTRA-ARTICULAR; INTRALESIONAL; INTRAMUSCULAR; INTRAVENOUS; SOFT TISSUE
Status: DISCONTINUED | OUTPATIENT
Start: 2023-01-01 | End: 2023-01-01

## 2023-01-01 RX ORDER — DIPHENHYDRAMINE HYDROCHLORIDE 50 MG/ML
25 INJECTION INTRAMUSCULAR; INTRAVENOUS ONCE
Status: CANCELLED | OUTPATIENT
Start: 2023-01-01 | End: 2023-01-01

## 2023-01-01 RX ORDER — SODIUM CHLORIDE, SODIUM LACTATE, POTASSIUM CHLORIDE, CALCIUM CHLORIDE 600; 310; 30; 20 MG/100ML; MG/100ML; MG/100ML; MG/100ML
1000 INJECTION, SOLUTION INTRAVENOUS
Status: COMPLETED | OUTPATIENT
Start: 2023-01-01 | End: 2023-01-01

## 2023-01-01 RX ORDER — INDOMETHACIN 50 MG/1
SUPPOSITORY RECTAL
Status: DISCONTINUED
Start: 2023-01-01 | End: 2023-01-01 | Stop reason: WASHOUT

## 2023-01-01 RX ORDER — PANTOPRAZOLE SODIUM 20 MG/1
20 TABLET, DELAYED RELEASE ORAL DAILY
Qty: 30 TABLET | Refills: 2 | Status: SHIPPED | OUTPATIENT
Start: 2023-01-01 | End: 2023-01-01

## 2023-01-01 RX ORDER — INDOMETHACIN 50 MG/1
SUPPOSITORY RECTAL
Status: COMPLETED
Start: 2023-01-01 | End: 2023-01-01

## 2023-01-01 RX ORDER — LIDOCAINE HYDROCHLORIDE 20 MG/ML
INJECTION INTRAVENOUS
Status: DISCONTINUED | OUTPATIENT
Start: 2023-01-01 | End: 2023-01-01

## 2023-01-01 RX ORDER — NOREPINEPHRINE BITARTRATE/D5W 8 MG/250ML
PLASTIC BAG, INJECTION (ML) INTRAVENOUS
Status: DISPENSED
Start: 2023-01-01 | End: 2023-01-01

## 2023-01-01 RX ORDER — ROCURONIUM BROMIDE 10 MG/ML
INJECTION, SOLUTION INTRAVENOUS
Status: DISCONTINUED | OUTPATIENT
Start: 2023-01-01 | End: 2023-01-01

## 2023-01-01 RX ORDER — SODIUM CHLORIDE, SODIUM LACTATE, POTASSIUM CHLORIDE, CALCIUM CHLORIDE 600; 310; 30; 20 MG/100ML; MG/100ML; MG/100ML; MG/100ML
INJECTION, SOLUTION INTRAVENOUS CONTINUOUS
Status: CANCELLED | OUTPATIENT
Start: 2023-01-01

## 2023-01-01 RX ORDER — SODIUM CHLORIDE, SODIUM GLUCONATE, SODIUM ACETATE, POTASSIUM CHLORIDE AND MAGNESIUM CHLORIDE 30; 37; 368; 526; 502 MG/100ML; MG/100ML; MG/100ML; MG/100ML; MG/100ML
INJECTION, SOLUTION INTRAVENOUS CONTINUOUS
Status: CANCELLED | OUTPATIENT
Start: 2023-01-01 | End: 2023-01-01

## 2023-01-01 RX ORDER — HYDROMORPHONE HYDROCHLORIDE 2 MG/ML
0.4 INJECTION, SOLUTION INTRAMUSCULAR; INTRAVENOUS; SUBCUTANEOUS EVERY 5 MIN PRN
Status: CANCELLED | OUTPATIENT
Start: 2023-01-01

## 2023-01-01 RX ORDER — MEPERIDINE HYDROCHLORIDE 25 MG/ML
12.5 INJECTION INTRAMUSCULAR; INTRAVENOUS; SUBCUTANEOUS ONCE
Status: CANCELLED | OUTPATIENT
Start: 2023-01-01 | End: 2023-01-01

## 2023-01-01 RX ORDER — PROPOFOL 10 MG/ML
VIAL (ML) INTRAVENOUS
Status: DISCONTINUED | OUTPATIENT
Start: 2023-01-01 | End: 2023-01-01

## 2023-01-01 RX ORDER — ONDANSETRON 4 MG/1
4 TABLET, ORALLY DISINTEGRATING ORAL ONCE
Status: CANCELLED | OUTPATIENT
Start: 2023-01-01 | End: 2023-01-01

## 2023-01-01 RX ORDER — ATROPINE SULFATE 10 MG/ML
2 SOLUTION/ DROPS OPHTHALMIC EVERY 4 HOURS PRN
Status: DISCONTINUED | OUTPATIENT
Start: 2023-01-01 | End: 2023-01-01 | Stop reason: HOSPADM

## 2023-01-01 RX ORDER — COLLAGENASE SANTYL 250 [ARB'U]/G
OINTMENT TOPICAL
COMMUNITY
Start: 2022-12-19 | End: 2023-01-01

## 2023-01-01 RX ORDER — HEPARIN SODIUM 5000 [USP'U]/ML
5000 INJECTION, SOLUTION INTRAVENOUS; SUBCUTANEOUS EVERY 8 HOURS
Status: DISCONTINUED | OUTPATIENT
Start: 2023-01-01 | End: 2023-01-01

## 2023-01-01 RX ORDER — DIPHENOXYLATE HYDROCHLORIDE AND ATROPINE SULFATE 2.5; .025 MG/1; MG/1
1 TABLET ORAL EVERY 8 HOURS PRN
Qty: 30 TABLET | Refills: 0 | Status: SHIPPED | OUTPATIENT
Start: 2023-01-01 | End: 2023-01-01

## 2023-01-01 RX ORDER — ACETAMINOPHEN 325 MG/1
650 TABLET ORAL EVERY 4 HOURS PRN
Status: CANCELLED | OUTPATIENT
Start: 2023-01-01

## 2023-01-01 RX ORDER — MIDODRINE HYDROCHLORIDE 5 MG/1
5 TABLET ORAL 2 TIMES DAILY WITH MEALS
Status: DISCONTINUED | OUTPATIENT
Start: 2023-01-01 | End: 2023-01-01

## 2023-01-01 RX ORDER — LIDOCAINE HYDROCHLORIDE 10 MG/ML
1 INJECTION, SOLUTION EPIDURAL; INFILTRATION; INTRACAUDAL; PERINEURAL ONCE
Status: CANCELLED | OUTPATIENT
Start: 2023-01-01 | End: 2023-01-01

## 2023-01-01 RX ORDER — ONDANSETRON 2 MG/ML
4 INJECTION INTRAMUSCULAR; INTRAVENOUS DAILY PRN
Status: CANCELLED | OUTPATIENT
Start: 2023-01-01

## 2023-01-01 RX ORDER — HYDROMORPHONE HYDROCHLORIDE 2 MG/ML
0.5 INJECTION, SOLUTION INTRAMUSCULAR; INTRAVENOUS; SUBCUTANEOUS
Status: DISCONTINUED | OUTPATIENT
Start: 2023-01-01 | End: 2023-01-01 | Stop reason: HOSPADM

## 2023-01-01 RX ORDER — LORAZEPAM 2 MG/ML
0.5 INJECTION INTRAMUSCULAR EVERY 30 MIN PRN
Status: DISCONTINUED | OUTPATIENT
Start: 2023-01-01 | End: 2023-01-01 | Stop reason: HOSPADM

## 2023-01-01 RX ORDER — HYDROCODONE BITARTRATE AND ACETAMINOPHEN 5; 325 MG/1; MG/1
1 TABLET ORAL EVERY 4 HOURS PRN
Status: CANCELLED | OUTPATIENT
Start: 2023-01-01

## 2023-01-01 RX ADMIN — DENOSUMAB 60 MG: 60 INJECTION SUBCUTANEOUS at 10:08

## 2023-01-01 RX ADMIN — SUGAMMADEX 200 MG: 100 INJECTION, SOLUTION INTRAVENOUS at 11:09

## 2023-01-01 RX ADMIN — SODIUM CHLORIDE 1000 ML: 9 INJECTION, SOLUTION INTRAVENOUS at 06:12

## 2023-01-01 RX ADMIN — PIPERACILLIN AND TAZOBACTAM 4.5 G: 4; .5 INJECTION, POWDER, FOR SOLUTION INTRAVENOUS at 01:12

## 2023-01-01 RX ADMIN — LIDOCAINE HYDROCHLORIDE 80 MG: 20 INJECTION INTRAVENOUS at 10:09

## 2023-01-01 RX ADMIN — NOREPINEPHRINE BITARTRATE 0.05 MCG/KG/MIN: 8 INJECTION, SOLUTION INTRAVENOUS at 01:12

## 2023-01-01 RX ADMIN — ONDANSETRON 4 MG: 2 INJECTION INTRAMUSCULAR; INTRAVENOUS at 11:09

## 2023-01-01 RX ADMIN — PROPOFOL 100 MG: 10 INJECTION, EMULSION INTRAVENOUS at 10:09

## 2023-01-01 RX ADMIN — MIDODRINE HYDROCHLORIDE 5 MG: 5 TABLET ORAL at 05:12

## 2023-01-01 RX ADMIN — MORPHINE SULFATE 2 MG: 4 INJECTION, SOLUTION INTRAMUSCULAR; INTRAVENOUS at 01:12

## 2023-01-01 RX ADMIN — INDOMETHACIN 100 MG: 50 SUPPOSITORY RECTAL at 10:09

## 2023-01-01 RX ADMIN — LORAZEPAM 1 MG: 2 INJECTION INTRAMUSCULAR; INTRAVENOUS at 11:12

## 2023-01-01 RX ADMIN — DENOSUMAB 60 MG: 60 INJECTION SUBCUTANEOUS at 09:02

## 2023-01-01 RX ADMIN — SODIUM CHLORIDE, POTASSIUM CHLORIDE, SODIUM LACTATE AND CALCIUM CHLORIDE 1000 ML: 600; 310; 30; 20 INJECTION, SOLUTION INTRAVENOUS at 01:12

## 2023-01-01 RX ADMIN — ROCURONIUM BROMIDE 50 MG: 10 SOLUTION INTRAVENOUS at 10:09

## 2023-01-01 RX ADMIN — VANCOMYCIN HYDROCHLORIDE 1500 MG: 1.5 INJECTION, POWDER, LYOPHILIZED, FOR SOLUTION INTRAVENOUS at 02:12

## 2023-01-01 RX ADMIN — DEXAMETHASONE SODIUM PHOSPHATE 4 MG: 4 INJECTION, SOLUTION INTRA-ARTICULAR; INTRALESIONAL; INTRAMUSCULAR; INTRAVENOUS; SOFT TISSUE at 10:09

## 2023-01-01 RX ADMIN — SODIUM CHLORIDE, SODIUM GLUCONATE, SODIUM ACETATE, POTASSIUM CHLORIDE AND MAGNESIUM CHLORIDE: 526; 502; 368; 37; 30 INJECTION, SOLUTION INTRAVENOUS at 10:09

## 2023-01-01 RX ADMIN — SODIUM CHLORIDE, POTASSIUM CHLORIDE, SODIUM LACTATE AND CALCIUM CHLORIDE 1000 ML: 600; 310; 30; 20 INJECTION, SOLUTION INTRAVENOUS at 11:12

## 2023-01-01 RX ADMIN — SODIUM CHLORIDE, POTASSIUM CHLORIDE, SODIUM LACTATE AND CALCIUM CHLORIDE 848 ML: 600; 310; 30; 20 INJECTION, SOLUTION INTRAVENOUS at 12:12

## 2023-01-09 NOTE — PROGRESS NOTES
"  Subjective:       Patient ID: Niecy Joshua is a 87 y.o. female.  Chief Complaint   Patient presents with    Left Hip - Follow-up     9 WEEK F/U IMN LEFT IT FEMUR FX, ABLE TO AMBULATE WITHOUT ASSISTANCE AT HOME.  NO COMPLAINTS        HPI    Patient presents for 9 week follow up IMN left IT fx. States ambulating without assistance at home. Using wheelchair today for long distance from parking lot. Doing well overall with little to no pain at this time. Taking occasional muscle relaxer for pain. Continues to take bisphosphonates for osteoporosis. No acute issues. Would like to transition to outpatient vs home physical therapy.   ROS:  Constitutional: Denies fever chills  Eyes: No change in vision  ENT: No ringing or current infections  CV: No chest pain  Resp: No labored breathing  MSK: Pain evident at site of injury located in HPI,   Integ: No signs of abrasions or lacerations  Neuro: No numbness or tingling  Lymphatic: No swelling outside the area of injury     Current Outpatient Medications on File Prior to Visit   Medication Sig Dispense Refill    calcium carbonate (OS-LEE) 500 mg calcium (1,250 mg) tablet Take 1 tablet by mouth 2 (two) times a day.      denosumab (PROLIA) 60 mg/mL Syrg Inject 60 mg into the skin.      ELIQUIS 5 mg Tab Take 1 tablet (5 mg total) by mouth 2 (two) times daily. 180 tablet 0    furosemide (LASIX) 20 MG tablet Take 0.5 tablets (10 mg total) by mouth once daily. 15 tablet 11    methocarbamoL (ROBAXIN) 750 MG Tab Take 500 mg by mouth Daily.      gabapentin (NEURONTIN) 100 MG capsule Take 2 capsules (200 mg total) by mouth 3 (three) times daily. (Patient taking differently: Take 200 mg by mouth 3 (three) times daily. PRN) 180 capsule 0     No current facility-administered medications on file prior to visit.          Objective:      /73   Pulse 68   Resp 18   Ht 5' 7" (1.702 m)   Wt 76.1 kg (167 lb 12.3 oz)   BMI 26.28 kg/m²   Physical Exam  General the patient is alert and " oriented x3 no acute distress nontoxic-appearing appropriate affect.    Constitutional: Vital signs are examined and stable.  Resp: No signs of labored breathing    Musculoskeletal:     Left lower extremity: no swelling; no deformity; no open wounds; compartments are soft and compressible; Tolerates range of motion of the hip and knee; minimal tenderness to palpation; dorsi/plantar flexes the foot; SILT distally; BCR distally; DP pulse palpable      Body mass index is 26.28 kg/m².  Ideal body weight: 61.6 kg (135 lb 12.9 oz)  Adjusted ideal body weight: 67.4 kg (148 lb 9.4 oz)  No results found for: HGBA1C  Hgb   Date Value Ref Range Status   11/14/2022 10.2 (L) 12.0 - 16.0 gm/dL Final   11/11/2022 10.2 (L) 12.0 - 16.0 gm/dL Final     Hct   Date Value Ref Range Status   11/14/2022 31.2 (L) 37.0 - 47.0 % Final   11/11/2022 31.6 (L) 37.0 - 47.0 % Final     Iron Level   Date Value Ref Range Status   11/04/2022 23 (L) 50 - 170 ug/dL Final     No components found for: FROLATE  Vit D 25 OH   Date Value Ref Range Status   11/05/2022 32.0 30.0 - 80.0 ng/mL Final   11/02/2022 30.4 30.0 - 80.0 ng/mL Final     WBC   Date Value Ref Range Status   11/14/2022 7.9 4.5 - 11.5 x10(3)/mcL Final   11/07/2022 7.5 4.5 - 11.5 x10(3)/mcL Final       Radiology: 3 view x rays of the left femur: hardware intact without signs of loosening or failure. Continued bony consolidation of the fracture site as well as the lesser trochanter as compared to previous images.         Assessment:         1. Closed comminuted intertrochanteric fracture of left femur with routine healing  X-Ray Femur 2 View Left    Ambulatory referral/consult to Physical/Occupational Therapy              Plan:         Follow up in about 2 months (around 3/9/2023), or if symptoms worsen or fail to improve.    Niecy was seen today for follow-up.    Diagnoses and all orders for this visit:    Closed comminuted intertrochanteric fracture of left femur with routine healing  -      X-Ray Femur 2 View Left; Future  -     Ambulatory referral/consult to Physical/Occupational Therapy; Future        -Order for outpatient therapy provided today. WBAT and ROMAT to the left hip.   - No restrictions at this time. She has continued signs of healing on x-rays today.   - We will follow up with her in approx 6-8 weeks for repeat x-rays and evaluation or sooner as needed  -ED precautions given    The above findings, diagnostics, and treatment plan were discussed with Dr. Friend who is in agreement with the plan of care except as stated in additional documentation.       Brooke De Guzman PA-C          Future Appointments   Date Time Provider Department Center   2/27/2023 10:00 AM INJECTION CHAIR 01, [facility] CHEMOTHERAPY INFUSION [facility] CHEMO [facility]   3/1/2023 10:20 AM King Duvall MD [place] [address]   3/6/2023  9:15 AM Florentino Friend, DO [facility] [place]   9/1/2023  9:40 AM MD SARA RoblesSHAMAR [place] [id number]

## 2023-01-23 NOTE — TELEPHONE ENCOUNTER
DEXA report   No significant change or deterioration since last bone density   Continue Prolia and vitamin-D   Repeat DEXA 1 year

## 2023-02-28 PROBLEM — M79.89 LOCALIZED SWELLING OF BOTH LOWER EXTREMITIES: Chronic | Status: ACTIVE | Noted: 2022-01-01

## 2023-02-28 PROBLEM — R54 FRAILTY: Chronic | Status: ACTIVE | Noted: 2023-01-01

## 2023-02-28 PROBLEM — R54 FRAILTY: Status: ACTIVE | Noted: 2023-01-01

## 2023-02-28 PROBLEM — M81.0 OSTEOPOROSIS: Chronic | Status: ACTIVE | Noted: 2022-07-10

## 2023-03-01 NOTE — PROGRESS NOTES
King Meza MD        PATIENT NAME: Niecy Joshua  : 1935  DATE: 3/1/23  MRN: [id number]      Billing Provider: King Meza MD  Level of Service: MS OFFICE/OUTPT VISIT, EST, LEVL III, 20-29 MIN  Patient PCP Information       Provider PCP Type    King Meza MD General            Reason for Visit / Chief Complaint: Medicare AWV Follow Up (3m)       Update PCP  Update Chief Complaint         History of Present Illness / Problem Focused Workflow     Niecy Joshua presents to the clinic with Medicare AWV Follow Up (3m)     Niecy is here to follow-up her edema and hip fracture situation   She is improving edema is much better she is using a walker to ambulate.      Review of Systems   Review of Systems   Constitutional: Negative.    HENT: Negative.     Eyes: Negative.    Respiratory: Negative.     Cardiovascular: Negative.    Gastrointestinal: Negative.    Endocrine: Negative.    Genitourinary: Negative.    Musculoskeletal: Negative.    Integumentary:  Negative.   Neurological: Negative.    Psychiatric/Behavioral: Negative.        Medical / Social / Family History     Past Medical History:   Diagnosis Date    Cardiac arrhythmia     Low vitamin D level     Nontoxic single thyroid nodule     Osteoporosis     Other pulmonary embolism without acute cor pulmonale        Past Surgical History:   Procedure Laterality Date    BREAST MASS EXCISION      x3  benign    BREAST SURGERY  79&84&87    Benign tumors    EYE SURGERY  2001    Cataracts    HERNIA REPAIR  &    Left/right    INTRAMEDULLARY RODDING OF FEMUR Left 2022    Procedure: INSERTION, INTRAMEDULLARY AMA, FEMUR;  Surgeon: Florentino Friend DO;  Location: [facility];  Service: Orthopedics;  Laterality: Left;  LEFT    LAPAROSCOPIC REPAIR OF INGUINAL HERNIA Bilateral        Social History  Ms. Joshua  reports that she has never smoked. She has never used smokeless tobacco. She reports that she does not drink alcohol and does not  use drugs.    Family History  Ms.'s Joshua  family history includes Cancer in her daughter; Clotting disorder in her mother; Miscarriages / Stillbirths in her daughter; No Known Problems in her father and sister.    Medications and Allergies     Medications  Outpatient Medications Marked as Taking for the 3/1/23 encounter (Office Visit) with King Duvall MD   Medication Sig Dispense Refill    apixaban (ELIQUIS) 5 mg Tab Take 1 tablet (5 mg total) by mouth 2 (two) times daily. 180 tablet 3    calcium carbonate (OS-LEE) 500 mg calcium (1,250 mg) tablet Take 1 tablet by mouth 2 (two) times a day.      denosumab (PROLIA) 60 mg/mL Syrg Inject 60 mg into the skin.      [DISCONTINUED] apixaban (ELIQUIS) 5 mg Tab Take 5 mg by mouth 2 (two) times daily.         Allergies  Review of patient's allergies indicates:  No Known Allergies    Physical Examination     Vitals:    03/01/23 1030   BP: 134/68   Pulse: 72     Physical Exam  Constitutional:       Appearance: Normal appearance.   HENT:      Head: Normocephalic and atraumatic.      Right Ear: Tympanic membrane normal.      Left Ear: Tympanic membrane normal.      Nose: Nose normal.      Mouth/Throat:      Mouth: Mucous membranes are moist.   Eyes:      Extraocular Movements: Extraocular movements intact.      Pupils: Pupils are equal, round, and reactive to light.   Cardiovascular:      Rate and Rhythm: Normal rate and regular rhythm.      Pulses: Normal pulses.   Pulmonary:      Effort: Pulmonary effort is normal.      Breath sounds: Normal breath sounds.   Abdominal:      General: Abdomen is flat. Bowel sounds are normal.      Palpations: Abdomen is soft.   Musculoskeletal:         General: Normal range of motion.      Cervical back: Normal range of motion and neck supple.   Skin:     General: Skin is warm and dry.   Neurological:      General: No focal deficit present.      Mental Status: She is alert and oriented to person, place, and time.   Psychiatric:          Mood and Affect: Mood normal.         Behavior: Behavior normal.        Assessment and Plan (including Health Maintenance)      Problem List  Smart Sets  Document Outside HM   :    Plan:   Age related osteoporosis, unspecified pathological fracture presence    Frailty    S/p left hip fracture  -     apixaban (ELIQUIS) 5 mg Tab; Take 1 tablet (5 mg total) by mouth 2 (two) times daily.  Dispense: 180 tablet; Refill: 3    Cardiac arrhythmia, unspecified cardiac arrhythmia type  -     apixaban (ELIQUIS) 5 mg Tab; Take 1 tablet (5 mg total) by mouth 2 (two) times daily.  Dispense: 180 tablet; Refill: 3    Nontoxic single thyroid nodule    Other chronic pulmonary embolism without acute cor pulmonale    Low vitamin D level     Discussed her medication she is still on Eliquis  Edema is resolved   Encouraged her to continue to use her walker   She is on Prolia for osteoporosis   Plan is visit 6 months with annual wellness exam.    Orders Placed This Encounter    apixaban (ELIQUIS) 5 mg Tab         Health Maintenance Due   Topic Date Due    Influenza Vaccine (1) Never done       Problem List Items Addressed This Visit          Cardiac/Vascular    Cardiac arrhythmia    Relevant Medications    apixaban (ELIQUIS) 5 mg Tab       Hematology    Other pulmonary embolism without acute cor pulmonale (Chronic)       Endocrine    Nontoxic single thyroid nodule (Chronic)       Orthopedic    S/p left hip fracture    Relevant Medications    apixaban (ELIQUIS) 5 mg Tab    Osteoporosis - Primary (Chronic)       Other    Low vitamin D level (Chronic)    Frailty (Chronic)       Health Maintenance Topics with due status: Not Due       Topic Last Completion Date    Lipid Panel 08/01/2022    DEXA Scan 01/20/2023       Future Appointments   Date Time Provider Department Center   3/6/2023  9:15 AM [facility] [place]   8/28/2023 10:00 AM INJECTION CHAIR 02, [facility]   9/1/2023  9:40 AM  King Duvall MD [place] [address]            Signature:  King Duvall MD  [facility]  [address] [place] [id number]    Date of encounter: 3/1/23

## 2023-03-06 NOTE — PROGRESS NOTES
Subjective:       Patient ID: Niecy Joshua is a 87 y.o. female.  Chief Complaint   Patient presents with    Left Femur - Follow-up     4.5 MONTH F/U IMN LEFT IT FEMUR FX, AMBULATING WITH WALKER. NO COMPLAINTS.       HPI:  Patient is 4-1/2 months out from a left intertrochanteric femur fracture 3-4 part.  She is working physical therapy ambulating with a walker.  Doing very well.  Back to most of her normal activities.  No significant pain in the hip.  Off pain medication.  Currently very happy with the care at this time.  She has pain that is dull achy pain left groin without radiation.  4/10 non severe    ROS:  Constitutional: Denies fever chills  Eyes: No change in vision  ENT: No ringing or current infections  CV: No chest pain  Resp: No labored breathing  MSK: Pain evident at site of injury located in HPI,   Integ: No signs of abrasions or lacerations  Neuro: No numbness or tingling  Lymphatic: No swelling outside the area of injury     Current Outpatient Medications on File Prior to Visit   Medication Sig Dispense Refill    apixaban (ELIQUIS) 5 mg Tab Take 1 tablet (5 mg total) by mouth 2 (two) times daily. 180 tablet 3    calcium carbonate (OS-LEE) 500 mg calcium (1,250 mg) tablet Take 1 tablet by mouth 2 (two) times a day.      denosumab (PROLIA) 60 mg/mL Syrg Inject 60 mg into the skin.       No current facility-administered medications on file prior to visit.          Objective:      BP (!) 146/75   Pulse 77   Temp 97.2 °F (36.2 °C) (Oral)   Wt 73.2 kg (161 lb 6 oz)   BMI 25.28 kg/m²   General the patient is alert and oriented x3 no acute distress nontoxic-appearing appropriate affect.    Constitutional: Vital signs are examined and stable.  Resp: No signs of labored breathing                 LLE: -Skin:  No signs of new abrasions or lacerations, no scars           -MSK: Hip and Knee F/E, EHL/FHL, Gastroc/Tib anterior Strength 5/5           -Neuro:  Sensation intact to light touch L3-S1 dermatomes            -Lymphatic: No signs of lymphadenopathy           -CV: Capillary refill is less than 2 seconds. DP/PT pulses 2/4. Compartments soft and compressible    Body mass index is 25.28 kg/m².  Ideal body weight: 61.6 kg (135 lb 12.9 oz)  Adjusted ideal body weight: 66.2 kg (146 lb 0.5 oz)  No results found for: HGBA1C  Hgb   Date Value Ref Range Status   11/14/2022 10.2 (L) 12.0 - 16.0 gm/dL Final   11/11/2022 10.2 (L) 12.0 - 16.0 gm/dL Final     Vit D 25 OH   Date Value Ref Range Status   11/05/2022 32.0 30.0 - 80.0 ng/mL Final   11/02/2022 30.4 30.0 - 80.0 ng/mL Final     WBC   Date Value Ref Range Status   11/14/2022 7.9 4.5 - 11.5 x10(3)/mcL Final   11/07/2022 7.5 4.5 - 11.5 x10(3)/mcL Final       Radiology:   Two views left femur skeletally mature individual show a large amount of callus and bone healing.  There is controlled collapse at the implant.        Assessment:         1. Closed comminuted intertrochanteric fracture of left femur with routine healing  X-Ray Femur 2 View Left              Plan:         No follow-ups on file.    Niecy was seen today for follow-up.    Diagnoses and all orders for this visit:    Closed comminuted intertrochanteric fracture of left femur with routine healing  -     X-Ray Femur 2 View Left; Future       Pt is doing well. Working with PT. Minimal pain. Using walker. She is very happy with the way her activity level is progressing.  Due to the fracture deformity we will continue to watch her.  Patient follow-up in 3 4 months for repeat x-rays.          This note/OR report was created with the assistance of  voice recognition software or phone  dictation.  There may be transcription errors as a result of using this technology however minimal. Effort has been made to assure accuracy of transcription but any obvious errors or omissions should be clarified with the author of the document.     Florentino Friend DO  Orthopedic Trauma Surgery  03/06/2023      Future Appointments   Date  Time Provider Department Center   8/28/2023 10:00 AM INJECTION CHAIR 02, [facility] CHEMOTHERAPY INFUSION [facility] CHEMO [facility]   9/1/2023  9:40 AM King Duvall MD [place] [id number]

## 2023-04-24 NOTE — TELEPHONE ENCOUNTER
----- Message from Duglas Montero sent at 4/24/2023 10:08 AM CDT -----  Regarding: call back  .Type:  Needs Medical Advice    Who Called: fartun  Would the patient rather a call back or a response via [facility]?   Best Call Back Number: [phone]  Additional Information: pt states she needs a order sent to [employer] for walker she wants a roller one with four wheels pls call back pt

## 2023-06-06 NOTE — PROGRESS NOTES
"Subjective:       Patient ID: Niecy Joshua is a 88 y.o. female.  Chief Complaint   Patient presents with    Left Femur - Follow-up     7 MONTH F/U FROM IMN LEFT IT FEMUR FX. AMBULATES WITH WALKER. NO COMPLAINTS.        HPI:  Patient is 7 months out from a left intertrochanteric femur fracture 3-4 part.  She is working physical therapy ambulating with a walker.  Doing very well.  Back to most of her normal activities although still ambulating with a rolling walker. No significant pain in the hip. States previous groin pain has greatly improved.     ROS:  Constitutional: Denies fever chills  Eyes: No change in vision  ENT: No ringing or current infections  CV: No chest pain  Resp: No labored breathing  MSK: Pain evident at site of injury located in HPI,   Integ: No signs of abrasions or lacerations  Neuro: No numbness or tingling  Lymphatic: No swelling outside the area of injury     Current Outpatient Medications on File Prior to Visit   Medication Sig Dispense Refill    apixaban (ELIQUIS) 5 mg Tab Take 1 tablet (5 mg total) by mouth 2 (two) times daily. 180 tablet 3    calcium carbonate (OS-LEE) 500 mg calcium (1,250 mg) tablet Take 1 tablet by mouth 2 (two) times a day.      denosumab (PROLIA) 60 mg/mL Syrg Inject 60 mg into the skin.       No current facility-administered medications on file prior to visit.          Objective:      /75   Pulse 84   Temp 97 °F (36.1 °C)   Ht 5' 7" (1.702 m)   Wt 73 kg (161 lb)   BMI 25.22 kg/m²   General the patient is alert and oriented x3 no acute distress nontoxic-appearing appropriate affect.    Constitutional: Vital signs are examined and stable.  Resp: No signs of labored breathing                 LLE: -Skin:  No signs of new abrasions or lacerations, no scars           -MSK: Hip and Knee F/E, EHL/FHL, Gastroc/Tib anterior Strength 5/5           -Neuro:  Sensation intact to light touch L3-S1 dermatomes           -Lymphatic: No signs of lymphadenopathy           " -CV: Capillary refill is less than 2 seconds. DP/PT pulses 2/4. Compartments soft and compressible    Body mass index is 25.22 kg/m².  Ideal body weight: 61.6 kg (135 lb 12.9 oz)  Adjusted ideal body weight: 66.2 kg (145 lb 14.1 oz)  No results found for: HGBA1C  Hgb   Date Value Ref Range Status   11/14/2022 10.2 (L) 12.0 - 16.0 gm/dL Final   11/11/2022 10.2 (L) 12.0 - 16.0 gm/dL Final     Vit D 25 OH   Date Value Ref Range Status   11/05/2022 32.0 30.0 - 80.0 ng/mL Final   11/02/2022 30.4 30.0 - 80.0 ng/mL Final     WBC   Date Value Ref Range Status   11/14/2022 7.9 4.5 - 11.5 x10(3)/mcL Final   11/07/2022 7.5 4.5 - 11.5 x10(3)/mcL Final       Radiology:   Two views left femur skeletally mature individual show a large amount of callus and bone healing.  There is controlled collapse at the implant, mostly unchanged as compared to previous images. Fracture appears to have gone on to full union with continued consolidation of her lesser trochanter         Assessment:         1. Closed comminuted intertrochanteric fracture of left femur with routine healing  X-Ray Femur 2 View Left              Plan:         Follow up if symptoms worsen or fail to improve.    Niecy was seen today for follow-up.    Diagnoses and all orders for this visit:    Closed comminuted intertrochanteric fracture of left femur with routine healing  -     X-Ray Femur 2 View Left; Future       Pt is doing well. Discharged from therapy, no pain, no groin discomfort today. Using walker. She is very happy overall and has accepted that she will likely need the walker for balance. She is doing very well overall with no painful hardware due to controlled collapse. She would like to follow up as needed at this time as she is doing well overall. She has gone on to near full union of her fracture. Discussed need to return if she has pain on the lateral side at the hardware site or in the groin for further evaluation. We will follow up with her as needed  in approx 4 months if she has any concerns.     The above findings, diagnostics, and treatment plan were discussed with Dr. Friend who is in agreement with the plan of care except as stated in additional documentation.     Brooke De Guzman PA-C  [facility]   Orthopedic Trauma      [facility] Appointments   Date Time Provider Department Center   8/28/2023 10:00 AM INJECTION CHAIR 02, [facility] CHEMOTHERAPY INFUSION [facility] CHEMO [facility]   9/1/2023  9:40 AM King Duvall MD [place] [address]

## 2023-07-01 NOTE — PLAN OF CARE
Problem: Adult Inpatient Plan of Care  Goal: Patient-Specific Goal (Individualized)  Outcome: Ongoing, Progressing  Flowsheets (Taken 11/3/2022 0241)  Patient-Specific Goals (Include Timeframe): TO BE ABLE TO DO MORE WITH THERAPY TOMORROW     Problem: Fall Injury Risk  Goal: Absence of Fall and Fall-Related Injury  Outcome: Ongoing, Progressing     Problem: Impaired Wound Healing  Goal: Optimal Wound Healing  Outcome: Ongoing, Progressing      Patent

## 2023-07-10 NOTE — PROGRESS NOTES
[facility]  PROGRESS NOTE        CHIEF COMPLAINT   Hospital follow up    HOSPITAL COURSE   Niecy Joshua is a 87 y.o. female with a past medical history of DVT, osteoporosis, and vitamin-D deficiency presented to [facility] on 10/31/2022 for mechanical fall.  Patient reports she was walking to the bathroom when she fell onto her left hip.  Patient denies hitting head, LOC, chest pain, shortness of breath, abdominal pain, nausea, vomiting, dizziness, syncope, and fever.  Patient reports improvement of left hip pain after pain medications given in ED.  Initial vital signs in the ED were /68, pulse 70, respirations 16, temperature 36.8° C, and SpO2 97%.  Labs revealed WBC 14.8, chloride 109, BUN 22.4, and glucose 148.  Chest x-ray revealed no acute pulmonary process.  Left hip x-ray revealed comminuted left intertrochanteric femur fracture.  Orthopedics was consulted with plans for surgery tomorrow. Patient was admitted to hospital medicine service for further medical management.   November 1st she underwent left intertrochanteric femur fracture intramedullary nailing with Dr. Friend.    Today  Seen and examined this morning.  Rough night last night unable to sleep, nausea this morning and some vomiting.        OBJECTIVE/PHYSICAL EXAM     VITAL SIGNS (MOST RECENT):  Temp: 98.1 °F (36.7 °C) (11/02/22 0716)  Pulse: 93 (11/02/22 0716)  Resp: 18 (11/02/22 0802)  BP: (!) 148/73 (11/02/22 0716)  SpO2: 98 % (11/02/22 0716)   VITAL SIGNS (24 HOUR RANGE):  Temp:  [98.1 °F (36.7 °C)-98.4 °F (36.9 °C)] 98.1 °F (36.7 °C)  Pulse:  [69-93] 93  Resp:  [16-18] 18  SpO2:  [92 %-98 %] 98 %  BP: (101-148)/(61-73) 148/73   GENERAL: In no acute distress, afebrile  HEENT:  CHEST: Clear to auscultation bilaterally  HEART: S1, S2, no appreciable murmur  ABDOMEN: Soft, nontender, BS +  MSK: Warm, no lower extremity edema, no clubbing or cyanosis, manage to the hip surgical site  NEUROLOGIC:  Alert and oriented x4, moving all extremities with good strength   INTEGUMENTARY:  PSYCHIATRY:        ASSESSMENT/PLAN   Left comminuted intertrochanteric femur fracture-status post intramedullary nailing November 1     Orthopedic surgery following.    Resume Eliquis home dose for long-term DVT prevention, cleared by Orthopedic surgery today after I spoke to them.  Diagnosed with DVT couple years ago.   consult for disposition planning  Protonix 40 daily.  Start Reglan scheduled for now.  Given a L of D5 half-normal saline.    DVT prophylaxis:  Eliquis    Anticipated discharge and disposition:   __________________________________________________________________________    LABS/MICRO/MEDS/DIAGNOSTICS       LABS  Recent Labs     11/02/22  0413      K 5.2*   CHLORIDE 107   CO2 21*   BUN 36.4*   CREATININE 0.97   GLUCOSE 139*   CALCIUM 8.9   ALKPHOS 61   AST 23   ALT 17   ALBUMIN 3.6     Recent Labs     11/02/22  0413   WBC 10.6   RBC 2.60*   HCT 25.1*   MCV 96.5*          MICROBIOLOGY  Microbiology Results (last 7 days)       ** No results found for the last 168 hours. **            MEDICATIONS   enoxaparin  30 mg Subcutaneous Q12H    melatonin  3 mg Oral Nightly    methocarbamoL  750 mg Oral TID    senna-docusate 8.6-50 mg  2 tablet Oral BID    sodium zirconium cyclosilicate  10 g Oral TID    vitamin D  5,000 Units Oral Daily      INFUSIONS      DIAGNOSTIC TESTS  X-Ray Femur 2 View Left   Final Result      Improved alignment following internal fixation of the left femur.         Electronically signed by: Mary Melton   Date:    11/01/2022   Time:    09:34      SURG FL Surgery Fluoro Usage   Final Result      X-Ray Hip 2 or 3 views Left (with Pelvis when performed)   Final Result      Acute, displaced and comminuted left femoral neck intertrochanteric fracture         Electronically signed by: Pankaj Barker MD   Date:    10/31/2022   Time:    13:07      X-Ray Chest 1 View   Final Result       No active pulmonary disease         Electronically signed by: Eliezer Granda   Date:    10/31/2022   Time:    09:31               All diagnosis and differential diagnosis have been reviewed; assessment and plan has been documented. I have personally reviewed the labs and test results that are presently available; I have reviewed the patients medication list. I have reviewed the consulting providers response and recommendations. I have reviewed or attempted to review medical records based upon their availability.  All of the patient's questions have been addressed and answered. Patient's is agreeable to the above stated plan. I will continue to monitor closely and make adjustments to medical management as needed.  This document was created using [employer] Fluency Direct.  Transcription errors may have been made.  Please contact me if any questions may rise regarding documentation to clarify verbiage.        Emile Stokes MD   11/02/2022   Internal Medicine              [Cervical Pap Smear] : cervical Pap smear [Liquid Base] : liquid base [GC & Chlamydia via Pap] : GC & Chlamydia via Pap [Tolerated Well] : the patient tolerated the procedure well [No Complications] : there were no complications

## 2023-08-25 NOTE — TELEPHONE ENCOUNTER
----- Message from April Stewart sent at 8/25/2023  9:31 AM CDT -----  Regarding: med advice  .Type:  Needs Medical Advice    [facility]  Symptoms (please be specific):    How long has patient had these symptoms:    Pharmacy name and phone #:    Would the patient rather a call back or a response via [facility]? Call back  Best Call Back Number:  [phone]  Additional Information: requesting a new order for the patient's appointment 8/28 for her prolia injection. Please advise.

## 2023-08-29 PROBLEM — R94.5 ABNORMAL LIVER FUNCTION: Status: ACTIVE | Noted: 2023-01-01

## 2023-08-29 NOTE — PROGRESS NOTES
King Duvall MD        PATIENT NAME: Niecy Joshua  : 1935  DATE: 23  MRN: [id number]      Patient Care Team:  King Duvall MD as PCP - General (Internal Medicine)  King Duvall MD       Billing Provider: King Duvall MD  Level of Service: [place] MEDICARE ANNUAL WELLNESS SUBSEQUENT VISIT  Patient PCP Information       Provider PCP Type    King Duvall MD General            Reason for Visit / Chief Complaint: Medicare AWV (Wellness/)       Update PCP  Update Chief Complaint         History of Present Illness / Problem Focused Workflow     Niecy Joshua presents to the clinic with Medicare AWV (Wellness/)     Patient is here for annual wellness exam   She is 88 years old feeling well with no symptoms.        Review of Systems   Review of Systems   Constitutional: Negative.    HENT: Negative.     Eyes: Negative.    Respiratory: Negative.     Cardiovascular: Negative.    Gastrointestinal: Negative.    Endocrine: Negative.    Genitourinary: Negative.    Musculoskeletal: Negative.    Integumentary:  Negative.   Neurological: Negative.    Psychiatric/Behavioral: Negative.          Patient Reported Health Risk Assessment       Medical / Social / Family History     Past Medical History:   Diagnosis Date    Cardiac arrhythmia     Low vitamin D level     Nontoxic single thyroid nodule     Osteoporosis     Other pulmonary embolism without acute cor pulmonale        Past Surgical History:   Procedure Laterality Date    BREAST MASS EXCISION      x3  benign    BREAST SURGERY  79&84&87    Benign tumors    EYE SURGERY  2001    Cataracts    HERNIA REPAIR  &    Left/right    INTRAMEDULLARY RODDING OF FEMUR Left 2022    Procedure: INSERTION, INTRAMEDULLARY AMA, FEMUR;  Surgeon: Florentino Friend DO;  Location: [facility];  Service: Orthopedics;  Laterality: Left;  LEFT    LAPAROSCOPIC REPAIR OF INGUINAL HERNIA Bilateral        Social History  Ms. Joshua  reports that she has  never smoked. She has never used smokeless tobacco. She reports that she does not drink alcohol and does not use drugs.    Family History  Ms.'s Joshua  family history includes Cancer in her daughter; Clotting disorder in her mother; Miscarriages / Stillbirths in her daughter; No Known Problems in her father and sister.        Medications and Allergies     Medications  Outpatient Medications Marked as Taking for the 8/29/23 encounter (Office Visit) with King Duvall MD   Medication Sig Dispense Refill    apixaban (ELIQUIS) 5 mg Tab Take 1 tablet (5 mg total) by mouth 2 (two) times daily. 180 tablet 3    calcium carbonate (OS-LEE) 500 mg calcium (1,250 mg) tablet Take 1 tablet by mouth 2 (two) times a day.      denosumab (PROLIA) 60 mg/mL Syrg Inject 60 mg into the skin.         Allergies  Review of patient's allergies indicates:  No Known Allergies    Physical Examination     Vitals:    08/29/23 0928   BP: 128/66   Pulse: 69     Physical Exam  Constitutional:       Appearance: Normal appearance.   HENT:      Head: Normocephalic and atraumatic.      Right Ear: Tympanic membrane normal.      Left Ear: Tympanic membrane normal.      Nose: Nose normal.      Mouth/Throat:      Mouth: Mucous membranes are moist.   Eyes:      Extraocular Movements: Extraocular movements intact.      Pupils: Pupils are equal, round, and reactive to light.   Cardiovascular:      Rate and Rhythm: Normal rate and regular rhythm.      Pulses: Normal pulses.   Pulmonary:      Effort: Pulmonary effort is normal.      Breath sounds: Normal breath sounds.   Abdominal:      General: Abdomen is flat. Bowel sounds are normal.      Palpations: Abdomen is soft.   Musculoskeletal:         General: Normal range of motion.      Cervical back: Normal range of motion and neck supple.   Skin:     General: Skin is warm and dry.   Neurological:      General: No focal deficit present.      Mental Status: She is alert and oriented to person, place, and  time.   Psychiatric:         Mood and Affect: Mood normal.         Behavior: Behavior normal.               No data to display                  8/29/2023     9:20 AM 8/28/2023    10:00 AM 3/6/2023     9:15 AM 3/1/2023    10:20 AM 2/27/2023    10:00 AM 1/9/2023    10:00 AM 12/28/2022     2:40 PM   Fall Risk Assessment - Outpatient   Mobility Status Ambulatory Ambulatory w/ assistance Ambulatory w/ assistance Ambulatory w/ assistance Ambulatory w/ assistance Ambulatory w/ assistance Wheelchair Bound   Number of falls 0 0 1 1 with injury 1 with injury 1 1 with injury   Identified as fall risk False True True True True True True   Wrist band applied     True                  Assessment and Plan (including Health Maintenance)      Problem List  Smart Sets  Document Outside HM   :    Plan:   Wellness examination    Chronic deep vein thrombosis (DVT) of distal vein of left lower extremity    Other chronic pulmonary embolism without acute cor pulmonale    Age related osteoporosis, unspecified pathological fracture presence    Abnormal liver function    Calculus of gallbladder with chronic cholecystitis without obstruction     Discussed abnormal liver function studies and further testing to be done both labs CT and ultrasound scheduled   She will return next week for follow-up.           Health Maintenance Due   Topic Date Due    TETANUS VACCINE  Never done    Shingles Vaccine (1 of 2) Never done    Pneumococcal Vaccines (Age 65+) (1 - PCV) Never done    COVID-19 Vaccine (3 - Moderna series) 10/15/2021       Problem List Items Addressed This Visit          Hematology    RESOLVED: Chronic deep vein thrombosis (DVT) of distal vein of lower extremity (Chronic)    RESOLVED: Other pulmonary embolism without acute cor pulmonale (Chronic)       Endocrine    Osteoporosis (Chronic)       GI    Abnormal liver function     Other Visit Diagnoses       Wellness examination    -  Primary    Calculus of gallbladder with chronic  cholecystitis without obstruction                Health Maintenance Topics with due status: Not Due       Topic Last Completion Date    DEXA Scan 01/20/2023    Lipid Panel 08/28/2023    Influenza Vaccine Not Due       Future Appointments   Date Time Provider Department Center   2/28/2024 10:00 AM INJECTION CHAIR 02, [facility] CHEMOTHERAPY INFUSION [facility] CHEMO [facility]          Medicare Annual Wellness and Personalized Prevention Plan:   Fall Risk + Home Safety + Hearing Impairment + Depression Screen + Cognitive Impairment Screen + Health Risk Assessment all reviewed.         Advance Care Planning   I attest to discussing Advance Care Planning with patient and/or family member.  Education was provided including the importance of the Health Care Power of , Advance Directives, and/or [place] documentation.  The patient expressed understanding to the importance of this information and discussion.       Opioid Screening: Patient medication list reviewed, patient is not taking prescription opioids. Patient is not using additional opioids than prescribed. Patient is at low risk of substance abuse based on this opioid use history.        Signature:  King Meza MD  [facility]  [address] [place] [address]  ROSENDO KOWALSKI [phone]    Date of encounter: 8/29/23    Follow up in about 9 days (around 9/7/2023), or Ren. In addition to their scheduled follow up, the patient has also been instructed to follow up on as needed basis.

## 2023-08-29 NOTE — ASSESSMENT & PLAN NOTE
New onset of markedly abnormal liver function studies   She is asymptomatic   Plans are to obtain ultrasound and CT scanning of the abdomen as well as hepatitis and liver panel evaluation   She will return 1 week for follow-up in the office.

## 2023-09-07 PROBLEM — K86.89 PANCREATIC MASS: Status: ACTIVE | Noted: 2023-01-01

## 2023-09-07 PROBLEM — K86.89 PANCREATIC MASS: Chronic | Status: ACTIVE | Noted: 2023-01-01

## 2023-09-07 PROBLEM — K83.1 BILE DUCT OBSTRUCTION: Status: ACTIVE | Noted: 2023-01-01

## 2023-09-07 PROBLEM — K21.9 ACID REFLUX: Chronic | Status: ACTIVE | Noted: 2023-01-01

## 2023-09-07 NOTE — ASSESSMENT & PLAN NOTE
-discussed abnormal CT findings with patient/daughter   -referral placed to Surgical Oncology  -order CA 19-9   -order CT chest for further screening

## 2023-09-07 NOTE — ASSESSMENT & PLAN NOTE
-ultrasound and CT abdomen pelvis discussed with patient/daughter   -referral placed to GI for possible ERCP

## 2023-09-07 NOTE — PROGRESS NOTES
Patient ID: Niecy Joshua is a 88 y.o. female.    Chief Complaint: Follow-up (Pt states she has some pain in her left thigh area of where pt had surgery for broken leg )    87-year-old female here today for a abnormal results follow-up visit.  Medical comorbidities include  history DVT/PE (Eliquis), osteoporosis, and vitamin-D deficiency .  Patient last seen 08/29/2023 for her yearly wellness visit.  At that time noted to have elevated liver function tests.  Repeat liver function tests indicating Total Bili 1.8, , , .  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 No metastatic disease identified.  Today presents feeling generally well.  Denies nausea, vomiting, abdominal discomfort/pain.  No noted jaundice.  Lengthy discussion had with patient and daughter regarding findings, support provided.  Patient wishing to have further workup performed.  Referrals placed for GI for possible ERCP as well as Surgical Onc for evaluation.      Orders Placed This Encounter   Procedures    Ambulatory referral/consult to Surgical Oncology     Standing Status:   Future     Standing Expiration Date:   10/7/2024     Referral Priority:   Urgent     Referral Type:   Consultation     Referral Reason:   Specialty Services Required     Referred to Provider:   Chad Gutierrez MD     Requested Specialty:   Surgical Oncology     Number of Visits Requested:   1    Ambulatory referral/consult to Gastroenterology     Standing Status:   Future     Standing Expiration Date:   10/7/2024     Referral Priority:   Urgent     Referral Type:   Consultation     Referral Reason:   Specialty Services Required     Requested Specialty:   Gastroenterology     Number of Visits  Requested:   1         MEDICAL HISTORY:    Past Medical History:   Diagnosis Date    Acid reflux 9/7/2023    Cardiac arrhythmia     Low vitamin D level     Nontoxic single thyroid nodule     Osteoporosis     Other pulmonary embolism without acute cor pulmonale       Past Surgical History:   Procedure Laterality Date    BREAST MASS EXCISION      x3  benign    BREAST SURGERY  79&84&87    Benign tumors    EYE SURGERY  2001    Cataracts    HERNIA REPAIR  1964&1979    Left/right    INTRAMEDULLARY RODDING OF FEMUR Left 11/01/2022    Procedure: INSERTION, INTRAMEDULLARY AMA, FEMUR;  Surgeon: Florentino Friend DO;  Location: [facility] OR;  Service: Orthopedics;  Laterality: Left;  LEFT    LAPAROSCOPIC REPAIR OF INGUINAL HERNIA Bilateral       Social History     Tobacco Use    Smoking status: Never    Smokeless tobacco: Never   Substance Use Topics    Alcohol use: Never    Drug use: Never          Health Maintenance Due   Topic Date Due    TETANUS VACCINE  Never done    Shingles Vaccine (1 of 2) Never done    Pneumococcal Vaccines (Age 65+) (1 - PCV) Never done    COVID-19 Vaccine (3 - Moderna series) 10/15/2021    Influenza Vaccine (1) Never done          Patient Care Team:  King Duvall MD as PCP - General (Internal Medicine)      Review of Systems   Constitutional:  Negative for fatigue and fever.   HENT:  Negative for congestion, rhinorrhea, sore throat and trouble swallowing.    Eyes:  Negative for redness and visual disturbance.   Respiratory:  Negative for cough, chest tightness and shortness of breath.    Cardiovascular:  Negative for chest pain and palpitations.   Gastrointestinal:  Negative for abdominal pain, constipation, diarrhea, nausea and vomiting.   Genitourinary:  Negative for dysuria, flank pain, frequency and urgency.   Musculoskeletal:  Negative for arthralgias, gait problem and myalgias.   Skin:  Negative for rash and wound.   Neurological:  Negative for facial asymmetry, speech difficulty, weakness and  headaches.   All other systems reviewed and are negative.      Objective:   /60 (BP Location: Left arm, Patient Position: Sitting, BP Method: Small (Automatic))   Pulse 77   Temp 97.5 °F (36.4 °C) (Temporal)   Resp 18   Wt 74.8 kg (165 lb)   SpO2 99%   BMI 25.84 kg/m²      Physical Exam  Constitutional:       General: She is not in acute distress.     Appearance: Normal appearance.   HENT:      Right Ear: Tympanic membrane, ear canal and external ear normal.      Left Ear: Tympanic membrane, ear canal and external ear normal.      Nose: Nose normal.      Mouth/Throat:      Mouth: Mucous membranes are moist.      Pharynx: Oropharynx is clear.   Eyes:      Extraocular Movements: Extraocular movements intact.      Conjunctiva/sclera: Conjunctivae normal.      Pupils: Pupils are equal, round, and reactive to light.   Cardiovascular:      Rate and Rhythm: Normal rate and regular rhythm.      Pulses: Normal pulses.      Heart sounds: Normal heart sounds. No murmur heard.     No gallop.   Pulmonary:      Effort: Pulmonary effort is normal.      Breath sounds: Normal breath sounds. No wheezing.   Abdominal:      General: Bowel sounds are normal. There is no distension.      Palpations: Abdomen is soft. There is no mass.      Tenderness: There is no abdominal tenderness. There is no guarding.   Musculoskeletal:         General: Normal range of motion.   Skin:     General: Skin is warm and dry.   Neurological:      Mental Status: She is alert. Mental status is at baseline.      Sensory: No sensory deficit.      Motor: No weakness.           Assessment:       ICD-10-CM ICD-9-CM   1. Pancreatic mass  K86.89 577.8   2. Bile duct obstruction  K83.1 576.2   3. Gastroesophageal reflux disease, unspecified whether esophagitis present  K21.9 530.81   4. Abnormal liver function  R94.5 794.8   5. Neoplasm of uncertain behavior of liver, gallbladder and bile ducts  D37.6 235.3        Plan:     Problem List Items Addressed  This Visit          GI    Pancreatic mass - Primary (Chronic)     -discussed abnormal CT findings with patient/daughter   -referral placed to Surgical Oncology  -order CA 19-9   -order CT chest for further screening         Relevant Orders    Ambulatory referral/consult to Surgical Oncology    CT Chest Without Contrast    Cancer Antigen 19-9    Acid reflux (Chronic)     -acute on chronic  -initiate trial on Protonix   -avoid food triggers         Relevant Medications    pantoprazole (PROTONIX) 20 MG tablet    Abnormal liver function    Relevant Orders    Comprehensive Metabolic Panel    Bile duct obstruction     -ultrasound and CT abdomen pelvis discussed with patient/daughter   -referral placed to GI for possible ERCP         Relevant Orders    Ambulatory referral/consult to Gastroenterology     Other Visit Diagnoses       Neoplasm of uncertain behavior of liver, gallbladder and bile ducts        Relevant Orders    Cancer Antigen 19-9               Follow up in about 4 weeks (around 10/5/2023) for General Checkup, with labs prior.   -plan specifics discussed above    Orders Placed This Encounter    CT Chest Without Contrast    Comprehensive Metabolic Panel    Cancer Antigen 19-9    Ambulatory referral/consult to Surgical Oncology    Ambulatory referral/consult to Gastroenterology    pantoprazole (PROTONIX) 20 MG tablet        Medication List with Changes/Refills   New Medications    PANTOPRAZOLE (PROTONIX) 20 MG TABLET    Take 1 tablet (20 mg total) by mouth once daily.   Current Medications    APIXABAN (ELIQUIS) 5 MG TAB    Take 1 tablet (5 mg total) by mouth 2 (two) times daily.    CALCIUM CARBONATE (OS-LEE) 500 MG CALCIUM (1,250 MG) TABLET    Take 1 tablet by mouth 2 (two) times a day.    DENOSUMAB (PROLIA) 60 MG/ML SYRG    Inject 60 mg into the skin.        I spent a total of 50 minutes on the day of the visit.This includes face to face time and non-face to face time preparing to see the patient (eg, review of  tests), obtaining and/or reviewing separately obtained history, documenting clinical information in the electronic or other health record, independently interpreting results and communicating results to the patient/family/caregiver, or care coordinator.

## 2023-09-12 NOTE — TELEPHONE ENCOUNTER
----- Message from LLUVIA Gomes sent at 9/12/2023 11:37 AM CDT -----  CT chest reviewed.  No evidence of metastatic disease.

## 2023-09-19 NOTE — TELEPHONE ENCOUNTER
----- Message from Duglas Montero sent at 9/19/2023 10:38 AM CDT -----  Regarding: call back  .Type:  Needs Medical Advice    Who Called: tamiko - gastrologist office  Would the patient rather a call back or a response via [facility]?   Best Call Back Number: [phone]  Additional Information: tamiko called and stated pt needs a clearance for apixaban (ELIQUIS) 5 mg she also stated she faxed the office

## 2023-09-19 NOTE — TELEPHONE ENCOUNTER
Nursing communication faxed over to [facility] for Dedra. Called office and let them know recommendations.

## 2023-09-20 NOTE — TELEPHONE ENCOUNTER
Obtain a stool for culture C diff and parasites   Begin Lomotil 1 q.8 hours p.r.n. diarrhea  Pepto-Bismol 2 tablets twice a day

## 2023-09-20 NOTE — TELEPHONE ENCOUNTER
----- Message from Cleo Burger sent at 9/20/2023  9:38 AM CDT -----  Regarding: call  .Type:  Needs Medical Advice    Who Called: pt - daughter - dipika  Symptoms (please be specific): Diarrhea  How long has patient had these symptoms:  5 days  Pharmacy name and phone #:    Would the patient rather a call back or a response via [facility]?   Best Call Back Number: [phone]  Additional Information: stomach virus request call back -

## 2023-09-21 NOTE — ANESTHESIA PREPROCEDURE EVALUATION
Advanced diffuse biliary duct dilation with abrupt termination of the common bile duct at the level of the pancreatic head where there is a sizable hypoenhancing mass measuring approximately 3.7 cm in greatest dimension.  Associated severe dilation of the main pancreatic duct and moderate to severe diffuse atrophy of the pancreatic body and tail.  Several coarse calcifications at the pancreatic head and proximal body.  The findings are most suggestive of a primary pancreatic adenocarcinoma causing ductal obstruction.  ERCP recommended.  No metastatic disease identified.  Severe narrowing of the superior mesenteric vein due to the pancreatic head mass.     Gallbladder distension without findings of acute cholecystitis.     Additional chronic findings as detailed above including diffuse atherosclerotic disease, simple bilateral renal cysts, and thoracic and lumbar spine degenerative disease.                                                                                              09/21/2023  Niecy Joshua is a 88 y.o., female.    Procedure Information    Case: [id number] Date/Time: 09/21/23 1030   Procedure: ERCP (Abdomen)   Anesthesia type: General/MAC   Diagnosis:        Gastrointestinal tract imaging abnormality [R93.3]       Obstruction of biliary tree [K83.1]       Elevated liver transaminase level [R74.01]   Location: [facility] ENDO 04 / [facility] ENDOSCOPY   Surgeons: Pardeep Barnes MD       Pre-op Assessment    I have reviewed the Patient Summary Reports.     I have reviewed the Nursing Notes. I have reviewed the NPO Status.   I have reviewed the Medications.     Review of Systems  Anesthesia Hx:  No problems with previous Anesthesia    Hematology/Oncology:  Hematology Normal   Oncology Normal     EENT/Dental:EENT/Dental Normal   Cardiovascular:  Cardiovascular Normal Exercise tolerance: good  Denies CAD.     Denies Angina.  Denies Orthopnea.  Denies JAIME.  Functional Capacity good / => 4  METS    Pulmonary:  Pulmonary Normal    Renal/:   Denies Chronic Renal Disease.     Hepatic/GI:   SEE PI   Musculoskeletal:  Musculoskeletal Normal    Neurological:  Neurology Normal    Endocrine:  Endocrine Normal  Denies Morbid Obesity / BMI > 40  Dermatological:  Skin Normal    Psych:  Psychiatric Normal           Physical Exam  General: Alert, Oriented, Well nourished and Cooperative    Airway:  Mallampati: II   Mouth Opening: Normal  TM Distance: Normal  Tongue: Normal  Neck ROM: Extension Decreased    Dental:  Intact    Chest/Lungs:  Clear to auscultation, Normal Respiratory Rate    Heart:  Rate: Normal  Rhythm: Regular Rhythm       Latest Reference Range & Units Most Recent   WBC 4.50 - 11.50 x10(3)/mcL 6.28  8/28/23 09:46   RBC 4.20 - 5.40 x10(6)/mcL 4.19 (L)  8/28/23 09:46   Hemoglobin 12.0 - 16.0 g/dL 12.7  8/28/23 09:46   Hematocrit 37.0 - 47.0 % 39.7  8/28/23 09:46   MCV 80.0 - 94.0 fL 94.7 (H)  8/28/23 09:46   MCH 27.0 - 31.0 pg 30.3  8/28/23 09:46   MCHC 33.0 - 36.0 g/dL 32.0 (L)  8/28/23 09:46   RDW 11.5 - 17.0 % 13.6  8/28/23 09:46   Platelets 130 - 400 x10(3)/mcL 215  8/28/23 09:46   MPV 7.4 - 10.4 fL 10.9 (H)  8/28/23 09:46   Platelet Estimate Normal, Adequate  Normal  11/2/22 04:13   Neutrophils Relative % 86  11/2/22 04:13   Neut % % 52.8  8/28/23 09:46   LYMPH % % 33.3  8/28/23 09:46   Lymphs % % 9  11/2/22 04:13   Mono % % 11.1  8/28/23 09:46   Eosinophil % % 1.8  8/28/23 09:46   Basophil % % 0.8  8/28/23 09:46   Immature Granulocytes % 0.2  8/28/23 09:46   Gran # (ANC) 2.1 - 9.2 x10(3)/mcL 9.116  11/2/22 04:13   Neut # 2.1 - 9.2 x10(3)/mcL 3.32  8/28/23 09:46   Lymph # 0.6 - 4.6 x10(3)/mcL 2.09  8/28/23 09:46   Mono # 0.1 - 1.3 x10(3)/mcL 0.70  8/28/23 09:46   Eos # 0 - 0.9 x10(3)/mcL 0.11  8/28/23 09:46   Baso # <=0.2 x10(3)/mcL 0.05  8/28/23 09:46   Immature Grans (Abs) 0 - 0.04 x10(3)/mcL 0.01  8/28/23 09:46   nRBC % 0.0  8/28/23 09:46   Poikilocytosis (none)  1+ !  11/2/22 04:13   RBC  Morph Normal  Abnormal !  11/2/22 04:13   Stomatocytes (none)  1+ !  11/2/22 04:13   Iron 50 - 170 ug/dL 96  8/29/23 10:12   TIBC 250 - 450 ug/dL 225 (L)  8/29/23 10:12   Iron Binding Capacity Unsaturated 70 - 310 ug/dL 129  8/29/23 10:12   Transferrin mg/dL 192  8/29/23 10:12   Ferritin 4.63 - 204.00 ng/mL 199.29  11/4/22 05:35   Iron Saturation 20 - 50 % 43  8/29/23 10:12   Protime 12.5 - 14.5 seconds 13.5  10/31/22 10:31   INR 0.00 - 1.30  1.04  10/31/22 10:31   aPTT 23.2 - 33.7 seconds 27.1  10/31/22 10:31   PTT Heparin Monitor 24.8 - 36.9 second(s) 28.8  7/18/19 16:37   Sodium 136 - 145 mmol/L 142  8/28/23 09:46   Potassium 3.5 - 5.1 mmol/L 4.9  8/28/23 09:46   Potassium 3.5 - 5.1 mmol/L 4.5 (E)  2/14/20 08:11   Chloride 98 - 107 mmol/L 110 (H)  8/28/23 09:46   CO2 23 - 31 mmol/L 26  8/28/23 09:46   Anion Gap mEq/L 6.0  11/11/22 06:02   BUN 9.8 - 20.1 mg/dL 22.3 (H)  8/28/23 09:46   Creatinine 0.55 - 1.02 mg/dL 0.82  8/29/23 10:12   BUN/CREAT RATIO  40  11/11/22 06:02   eGFR mls/min/1.73/m2 >60  8/29/23 10:12   eGFR if non African American mL/min/1.73 m2 >60  7/19/19 05:39   eGFR if African American >=60 mL/min/1.73mSq 88 (E)  2/14/20 08:11   GFR MDRD NON AF AMER >=60 mL/min/1.73mSq 73 (E)  2/14/20 08:11   Glucose 82 - 115 mg/dL 113  8/28/23 09:46   Calcium 8.4 - 10.2 mg/dL 10.0  8/28/23 09:46   Phosphorus 2.3 - 4.7 mg/dL 2.7  11/14/22 05:19   Magnesium  1.60 - 2.60 mg/dL 2.30  11/14/22 05:19   Alkaline Phosphatase 40 - 150 unit/L 453 (H)  8/28/23 09:46   PROTEIN TOTAL 5.8 - 7.6 gm/dL 6.6  8/28/23 09:46   Albumin 3.4 - 4.8 g/dL 3.9  8/28/23 09:46   Albumin/Globulin Ratio 1.1 - 2.0 ratio 1.4  8/28/23 09:46   Prealbumin 14.0 - 37.0 mg/dL 9.7 (L)  11/4/22 05:35   BILIRUBIN TOTAL <=1.5 mg/dL 1.8 (H)  8/28/23 09:46   Bilirubin Direct 0.00 - 0.20 mg/dL 0.20  7/19/19 05:39   Bilirubin, Indirect 0.00 - 0.80 mg/dL 1.20 (H)  7/19/19 05:39   AST 5 - 34 unit/L 119 (H)  8/29/23 10:12   ALT 0 - 55 unit/L 240 (H)  8/29/23  10:12   GGT 9 - 36 U/L 860 (H)  8/29/23 10:12   Globulin, Total 2.4 - 3.5 gm/dL 2.7  8/28/23 09:46   Cholesterol <=200 mg/dL 229 (H)  8/28/23 09:46   HDL 35 - 60 mg/dL 65 (H)  8/28/23 09:46   LDL Cholesterol External 50.00 - 140.00 mg/dL 149.00 (H)  8/28/23 09:46   Total Cholesterol/HDL Ratio 0 - 5  4  8/28/23 09:46   Triglycerides 37 - 140 mg/dL 76  8/28/23 09:46   Very Low Density Lipoprotein  15  8/28/23 09:46   BNP 0 - 100 pg/mL 203 (H)  7/18/19 13:36   Troponin I 0.02 - 0.49 ng/mL <0.02  7/18/19 12:41   Vit D, 25-Hydroxy 30.0 - 80.0 ng/mL 39.6  8/28/23 09:46   Group & Rh  A NEG  11/4/22 05:35   Indirect Ke GEL  NEG  11/4/22 05:35   PREPARE RBC SOFT  Rpt  11/4/22 05:35   Hep A IgM Nonreactive  Nonreactive  8/29/23 10:12   Hep B C IgM Nonreactive  Nonreactive  8/29/23 10:12   Hepatitis B Surface Antigen Nonreactive  Nonreactive  8/29/23 10:12   Hepatitis C Ab Nonreactive  Nonreactive  8/29/23 10:12   ID NOW COVID-19, (JOSEPH) Negative  Negative  11/3/22 09:18   Color, UA Yellow, Light-Yellow, Dark Yellow, Sally, Straw  Yellow  8/28/23 09:46   Appearance, UA Clear  Cloudy !  8/28/23 09:46   Specific Gravity,UA 1.005 - 1.030  1.018  8/28/23 09:46   pH, UA 5.0 - 8.5  6.0  8/28/23 09:46   Protein, UA Negative  Negative  8/28/23 09:46   Glucose, UA Negative, Normal  Negative  8/28/23 09:46   Ketones, UA Negative  Negative  8/28/23 09:46   Occult Blood UA Negative  Negative  8/28/23 09:46   NITRITE UA Negative  Positive !  8/28/23 09:46   Bilirubin, UA Negative  Negative  8/28/23 09:46   Urobilinogen, UA 0.2, 1.0, Normal  1.0  8/28/23 09:46   Leukocytes, UA Negative  3+ !  8/28/23 09:46   RBC, UA None Seen, 0-2, 3-5, 0-5 /HPF 0-2  8/28/23 09:46   WBC, UA <=5 /HPF 54 (H)  8/28/23 09:46   Bacteria, UA None Seen, Rare, Occasional /HPF 4+ !  8/28/23 09:46   Squam Epithel, UA 0-4, None Seen /HPF 0-4  8/28/23 09:46   CULTURE, URINE  Rpt !  8/28/23 09:46   CT ABDOMEN WITH CONTRAST  Rpt  8/31/23 09:55   CT CHEST WITHOUT  CONTRAST  Rpt  9/12/23 09:28   CTA CHEST NON CORONARY  Rpt  7/18/19 16:12   XR CHEST 1 VIEW  Rpt  10/31/22 09:29   XR FEMUR 2 VIEW LEFT  Rpt  6/6/23 09:33   XR HIP WITH PELVIS WHEN PERFORMED, 2 OR 3 VIEWS LEFT  Rpt  10/31/22 11:23   MAMMO DIGITAL SCREENING BILAT WITH EAMON  Rpt  8/24/22 14:36   US ABDOMEN COMPLETE  Rpt  8/31/23 09:42   US THYROID  Rpt  8/20/20 10:13   DEXA BONE DENSITY APPENDICULAR SKELETON  Rpt  1/20/23 11:44   DXA BONE DENSITY AXIAL SKELETON 1 OR MORE SITES  Rpt  1/20/23 11:44   BMD Recommendation External  Repeat BMD in 1 year (E)  1/7/22 00:00   CARDIOLOGY REPORT  Rpt  2/11/20 05:36   Crossmatch Interpretation  Compatible  11/4/22 05:35  Compatible  11/4/22 05:35   Glucose Screen 70 - 100 mg/dL 92 (E)  2/14/20 08:11   SURG - FL SURGERY FLUORO USAGE  Rpt  11/1/22 08:40   Vitamin D, 25-OH, Total 30.0 - 100.0 NG/ML 36.5 (E)  2/14/20 08:11   (L): Data is abnormally low  (H): Data is abnormally high  !: Data is abnormal  (E): External lab result  Rpt: View report in Results Review for more information    Anesthesia Plan  Type of Anesthesia, risks & benefits discussed:    Anesthesia Type: Gen Natural Airway  Intra-op Monitoring Plan: Standard ASA Monitors  Post Op Pain Control Plan: multimodal analgesia  Induction:  IV  Airway Plan: Direct  Informed Consent: Informed consent signed with the Patient and all parties understand the risks and agree with anesthesia plan.  All questions answered. Patient consented to blood products? Yes  ASA Score: 3  Day of Surgery Review of History & Physical: H&P Update referred to the surgeon/provider.I have interviewed and examined the patient. I have reviewed the patient's H&P dated: There are no significant changes.     Ready For Surgery From Anesthesia Perspective.     .

## 2023-09-21 NOTE — TRANSFER OF CARE
"Anesthesia Transfer of Care Note    Patient: Niecy Joshua    Procedure(s) Performed: Procedure(s):  EGD, WITH BALLOON DILATION    Patient location: GI    Anesthesia Type: general    Transport from OR: Transported from OR on room air with adequate spontaneous ventilation    Post pain: adequate analgesia    Post assessment: no apparent anesthetic complications    Post vital signs: stable    Level of consciousness: awake    Nausea/Vomiting: no nausea/vomiting    Complications: none    Transfer of care protocol was followedComments: Extubated awake sitting up, no issues      Last vitals:   Visit Vitals  /86 (BP Location: Left arm, Patient Position: Sitting)   Pulse 78   Temp 37 °C (98.6 °F)   Resp 16   Ht 5' 8" (1.727 m)   Wt 74.8 kg (165 lb)   SpO2 98%   BMI 25.09 kg/m²     "

## 2023-09-21 NOTE — ANESTHESIA PROCEDURE NOTES
Intubation    Date/Time: 9/21/2023 10:37 AM    Performed by: Indira Bonilla  Authorized by: Michoacano Rudolph MD    Intubation:     Induction:  Intravenous    Intubated:  Postinduction    Mask Ventilation:  Easy mask    Attempts:  1    Attempted By:  Student    Method of Intubation:  Video laryngoscopy    Blade:  Roman 3    Laryngeal View Grade: Grade IIA - cords partially seen      Difficult Airway Encountered?: No      Complications:  None    Airway Device:  Oral endotracheal tube    Airway Device Size:  7.0    Style/Cuff Inflation:  Cuffed (inflated to minimal occlusive pressure)    Inflation Amount (mL):  6    Tube secured:  22    Secured at:  The lips    Placement Verified By:  Capnometry    Complicating Factors:  None and poor neck/head extension    Findings Post-Intubation:  BS equal bilateral and atraumatic/condition of teeth unchanged  Notes:      Limited neck ROM, stiff

## 2023-09-21 NOTE — H&P
Endoscopy History and Physical    PCP - King Duvall MD    Procedure - ERCP  ASA & Mallampati - per anesthesia      HPI:  This is a 88 y.o. female here for evaluation of elevated LFTs and new HOP mass.       ROS:  Constitutional: No fevers, chills, No weight loss  ENT: No allergies  CV: No chest pain  Pulm: No shortness of breath  GI: see HPI  Derm: No rash    Medical History:  has a past medical history of Acid reflux (9/7/2023), Cardiac arrhythmia, Low vitamin D level, Nontoxic single thyroid nodule, Osteoporosis, and Other pulmonary embolism without acute cor pulmonale.    Surgical History:  has a past surgical history that includes Laparoscopic repair of inguinal hernia (Bilateral); Breast mass excision; Intramedullary rodding of femur (Left, 11/01/2022); Eye surgery (2001); Breast surgery ([age]); and Hernia repair (1964&1979).    Family History: family history includes Cancer in her daughter; Clotting disorder in her mother; Miscarriages / Stillbirths in her daughter; No Known Problems in her father and sister.     Social History:  reports that she has never smoked. She has never used smokeless tobacco. She reports that she does not drink alcohol and does not use drugs.    Review of patient's allergies indicates:  No Known Allergies    Medications:   Medications Prior to Admission   Medication Sig Dispense Refill Last Dose    calcium carbonate (OS-LEE) 500 mg calcium (1,250 mg) tablet Take 1 tablet by mouth 2 (two) times a day.   9/20/2023    denosumab (PROLIA) 60 mg/mL Syrg Inject 60 mg into the skin.   9/20/2023    diphenoxylate-atropine 2.5-0.025 mg (LOMOTIL) 2.5-0.025 mg per tablet Take 1 tablet by mouth every 8 (eight) hours as needed for Diarrhea (1 tablet Q 8 hrs PRN). 30 tablet 0 9/20/2023    pantoprazole (PROTONIX) 20 MG tablet Take 1 tablet (20 mg total) by mouth once daily. 30 tablet 2 9/20/2023    apixaban (ELIQUIS) 5 mg Tab Take 1 tablet (5 mg total) by mouth 2 (two) times daily. 180  tablet 3 9/19/2023         Objective Findings:    Vital Signs: see nursing notes  Physical Exam:  General Appearance: elderly WF, well appearing in no acute distress  Neuro: A&O x 3, no focal deficits  Eyes: + sclearal icterus  ENT: Neck supple  Lungs: CTA anteriorly  Heart:  S1, S2 normal, no murmurs heard  Abdomen: Soft, non tender, non distended with positive bowel sounds. No hepatosplenomegaly, ascites, or mass  Extremities: no edema  Skin: No rash      Labs:  Lab Results   Component Value Date    WBC 6.28 08/28/2023    HGB 12.7 08/28/2023    HCT 39.7 08/28/2023     08/28/2023    CHOL 229 (H) 08/28/2023    TRIG 76 08/28/2023    HDL 65 (H) 08/28/2023     (H) 08/29/2023     (H) 08/29/2023     08/28/2023    K 4.9 08/28/2023     02/14/2020    CREATININE 0.82 08/29/2023    BUN 22.3 (H) 08/28/2023    CO2 26 08/28/2023    INR 1.04 10/31/2022       I have explained the risks and benefits of endoscopy procedures to the patient including but not limited to bleeding, perforation, infection, and death.    Pardeep Barnes MD

## 2023-09-21 NOTE — [employer]OVATION PATIENT INSTRUCTIONS
Discharge Summary/Instructions after an Endoscopic Procedure  Patient Name: Niecy Joshua  Patient MRN: [id number]  Patient YOB: 1935 Thursday, September 21, 2023  Pardeep Barnes MD  Dear patient,  As a result of recent federal legislation (The Federal Cures Act), you may   receive lab or pathology results from your procedure in your [facility]   account before your physician is able to contact you. Your physician or   their representative will relay the results to you with their   recommendations at their soonest availability.  Thank you,  RESTRICTIONS:  During your procedure today, you received medications for sedation.  These   medications may affect your judgment, balance and coordination.  Therefore,   for 24 hours, you have the following restrictions:   - DO NOT drive a car, operate machinery, make legal/financial decisions,   sign important papers or drink alcohol.    ACTIVITY:  Today: no heavy lifting, straining or running due to procedural   sedation/anesthesia.  The following day: return to full activity including work.  DIET:  Eat and drink normally unless instructed otherwise.     TREATMENT FOR COMMON SIDE EFFECTS:  - Mild abdominal pain, nausea, belching, bloating or excessive gas:  rest,   eat lightly and use a heating pad.  - Sore Throat: treat with throat lozenges and/or gargle with warm salt   water.  - Because air was used during the procedure, expelling large amounts of air   from your rectum or belching is normal.  - If a bowel prep was taken, you may not have a bowel movement for 1-3 days.    This is normal.  SYMPTOMS TO WATCH FOR AND REPORT TO YOUR PHYSICIAN:  1. Abdominal pain or bloating, other than gas cramps.  2. Chest pain.  3. Back pain.  4. Signs of infection such as: chills or fever occurring within 24 hours   after the procedure.  5. Rectal bleeding, which would show as bright red, maroon, or black stools.   (A tablespoon of blood from the rectum is not serious, especially  if   hemorrhoids are present.)  6. Vomiting.  7. Weakness or dizziness.  GO DIRECTLY TO THE NEAREST EMERGENCY ROOM IF YOU HAVE ANY OF THE FOLLOWING:      Difficulty breathing              Chills and/or fever over 101 F   Persistent vomiting and/or vomiting blood   Severe abdominal pain   Severe chest pain   Black, tarry stools   Bleeding- more than one tablespoon   Any other symptom or condition that you feel may need urgent attention  Your doctor recommends these additional instructions:  If any biopsies were taken, your doctors clinic will contact you in 1 to 2   weeks with any results.  - Discharge patient to home (with escort).   - Full liquid diet indefinitely.   - Continue present medications.   - Observe patient's clinical course.   - The findings and recommendations were discussed with the surgeon.   - The findings and recommendations were discussed with the patient's primary   physician.   - The findings and recommendations were discussed with the patient and their   family.   - Pt is 88 and her presentation is consistent with locally advanced   pancreatic cancer in the head with partial gastric outlet obstruction   secondary to duodenal extrinsic compression.  Need to understand patient's   goals of care and wishes before determining a path of interventions.  Will   discuss case with Dr. Duvall, her PCP as well as Dr. Sky Linn,   surgical oncologist.  For questions, problems or results please call your physician - Pardeep Barnes MD at Work:  [phone].  [facility] [place], EMERGENCY ROOM PHONE NUMBER: [phone]  IF A COMPLICATION OR EMERGENCY SITUATION ARISES AND YOU ARE UNABLE TO REACH   YOUR PHYSICIAN - GO DIRECTLY TO THE EMERGENCY ROOM.  Pardeep Barnes MD  9/21/2023 12:36:29 PM  This report has been verified and signed electronically.  Dear patient,  As a result of recent federal legislation (The Federal Cures Act), you may   receive lab or pathology results from your procedure  in your [facility]   account before your physician is able to contact you. Your physician or   their representative will relay the results to you with their   recommendations at their soonest availability.  Thank you,  [employer]ATION

## 2023-09-21 NOTE — ANESTHESIA POSTPROCEDURE EVALUATION
Anesthesia Post Evaluation    Patient: Niecy Joshua    Procedure(s) Performed: Procedure(s):  EGD, WITH BALLOON DILATION    Final Anesthesia Type: general      Patient location during evaluation: PACU  Patient participation: Yes- Able to Participate  Level of consciousness: awake and alert and oriented  Post-procedure vital signs: reviewed and stable  Pain management: adequate  Airway patency: patent  VIRIDIANA mitigation strategies: Verification of full reversal of neuromuscular block  PONV status at discharge: No PONV  Anesthetic complications: no      Cardiovascular status: blood pressure returned to baseline and stable  Respiratory status: spontaneous ventilation and unassisted  Hydration status: euvolemic  Follow-up not needed.  Comments: [facility]          Vitals Value Taken Time   /86 09/21/23 1206   Temp 37 °C (98.6 °F) 09/21/23 1206   Pulse 78 09/21/23 1206   Resp 16 09/21/23 1206   SpO2 98 % 09/21/23 1206         No case tracking events are documented in the log.      Pain/Yomi Score: No data recorded

## 2023-09-22 PROBLEM — C25.9 PANCREATIC CANCER: Status: ACTIVE | Noted: 2023-01-01

## 2023-09-22 NOTE — ASSESSMENT & PLAN NOTE
Extensive discussion and review of the pancreatic mass with all the family and with the patient showing them diagrams and illustrations and scans of the tumor.  The biggest problem at this point is the gastric stricture of the outlet with prevents her from eating solid food.  At this point she will be on a pureed liquid diet I talked to Dr. Meyer this morning he is going to be seeing on Tuesday to offer a bypass procedure.  Repeat blood work and a CA 19 9 will be done today.

## 2023-09-22 NOTE — PROGRESS NOTES
King Meza MD        PATIENT NAME: Niecy Joshua  : 1935  DATE: 23  MRN: [id number]      Billing Provider: King Meza MD  Level of Service: [place] OFFICE/OUTPT VISIT, EST, LEVL III, 20-29 MIN  Patient PCP Information       Provider PCP Type    King Meza MD General            Reason for Visit / Chief Complaint: Follow-up (1 mth f/u/)       Update PCP  Update Chief Complaint         History of Present Illness / Problem Focused Workflow     Niecy Joshua presents to the clinic with Follow-up (1 mth f/u/)     Mart is here with her family in consultation about her newly diagnosed pancreatic cancer.  Going to extensive conversation review the records from Dr. Barnes's office admitted in touch with Dr. Linn.  Presently she is asymptomatic and feeling well she would little diarrhea she has no pain no weight loss.        Review of Systems   Review of Systems   Constitutional: Negative.    HENT: Negative.     Eyes: Negative.    Respiratory: Negative.     Cardiovascular: Negative.    Gastrointestinal: Negative.    Endocrine: Negative.    Genitourinary: Negative.    Musculoskeletal: Negative.    Integumentary:  Negative.   Neurological: Negative.    Psychiatric/Behavioral: Negative.          Medical / Social / Family History     Past Medical History:   Diagnosis Date    Acid reflux 2023    Cardiac arrhythmia     Low vitamin D level     Nontoxic single thyroid nodule     Osteoporosis     Other pulmonary embolism without acute cor pulmonale        Past Surgical History:   Procedure Laterality Date    BREAST MASS EXCISION      x3  benign    BREAST SURGERY  79&84&87    Benign tumors    EGD, WITH BALLOON DILATION  2023    Procedure: EGD, WITH BALLOON DILATION;  Surgeon: Pardeep Barnes MD;  Location: [facility] ENDOSCOPY;  Service: Gastroenterology;;    EYE SURGERY      Cataracts    HERNIA REPAIR  &    Left/right    INTRAMEDULLARY RODDING OF FEMUR Left 2022     Procedure: INSERTION, INTRAMEDULLARY AMA, FEMUR;  Surgeon: Florentino Friend DO;  Location: [facility];  Service: Orthopedics;  Laterality: Left;  LEFT    LAPAROSCOPIC REPAIR OF INGUINAL HERNIA Bilateral        Social History  Ms. Joshua  reports that she has never smoked. She has never used smokeless tobacco. She reports that she does not drink alcohol and does not use drugs.    Family History  Ms.'s Joshua  family history includes Cancer in her daughter; Clotting disorder in her mother; Miscarriages / Stillbirths in her daughter; No Known Problems in her father and sister.    Medications and Allergies     Medications  Outpatient Medications Marked as Taking for the 9/22/23 encounter (Office Visit) with King Duvall MD   Medication Sig Dispense Refill    apixaban (ELIQUIS) 5 mg Tab Take 1 tablet (5 mg total) by mouth 2 (two) times daily. 180 tablet 3    calcium carbonate (OS-LEE) 500 mg calcium (1,250 mg) tablet Take 1 tablet by mouth 2 (two) times a day.      denosumab (PROLIA) 60 mg/mL Syrg Inject 60 mg into the skin.      diphenoxylate-atropine 2.5-0.025 mg (LOMOTIL) 2.5-0.025 mg per tablet Take 1 tablet by mouth every 8 (eight) hours as needed for Diarrhea (1 tablet Q 8 hrs PRN). 30 tablet 0    pantoprazole (PROTONIX) 20 MG tablet Take 1 tablet (20 mg total) by mouth once daily. 30 tablet 2       Allergies  Review of patient's allergies indicates:  No Known Allergies    Physical Examination     Vitals:    09/22/23 1053   BP: 118/60   Pulse: 67     Physical Exam  Constitutional:       Appearance: Normal appearance.   HENT:      Head: Normocephalic and atraumatic.      Right Ear: Tympanic membrane normal.      Left Ear: Tympanic membrane normal.      Nose: Nose normal.      Mouth/Throat:      Mouth: Mucous membranes are moist.   Eyes:      Extraocular Movements: Extraocular movements intact.      Pupils: Pupils are equal, round, and reactive to light.   Cardiovascular:      Rate and Rhythm: Normal rate and  regular rhythm.      Pulses: Normal pulses.   Pulmonary:      Effort: Pulmonary effort is normal.      Breath sounds: Normal breath sounds.   Abdominal:      General: Abdomen is flat. Bowel sounds are normal.      Palpations: Abdomen is soft.   Musculoskeletal:         General: Normal range of motion.      Cervical back: Normal range of motion and neck supple.   Skin:     General: Skin is warm and dry.   Neurological:      General: No focal deficit present.      Mental Status: She is alert and oriented to person, place, and time.   Psychiatric:         Mood and Affect: Mood normal.         Behavior: Behavior normal.          Assessment and Plan (including Health Maintenance)      Problem List  Smart Sets  Document Outside HM   :    Plan:    ICD-10-CM ICD-9-CM   1. Malignant neoplasm of pancreas, unspecified location of malignancy  C25.9 157.9     Time spent incompetence with Dr. Barnes and Dr. Linn reviewing records meeting with the family members and the patient examination and plan of action 1 hour today.  Problem List Items Addressed This Visit          Oncology    Pancreatic cancer     Extensive discussion and review of the pancreatic mass with all the family and with the patient showing them diagrams and illustrations and scans of the tumor.  The biggest problem at this point is the gastric stricture of the outlet with prevents her from eating solid food.  At this point she will be on a pureed liquid diet I talked to Dr. Meyer this morning he is going to be seeing on Tuesday to offer a bypass procedure.  Repeat blood work and a CA 19 9 will be done today.                   Health Maintenance Due   Topic Date Due    Pneumococcal Vaccines (Age 65+) (1 - PCV) Never done    TETANUS VACCINE  Never done    Shingles Vaccine (1 of 2) Never done    COVID-19 Vaccine (3 - Moderna series) 10/15/2021       Problem List Items Addressed This Visit          Oncology    Pancreatic cancer    Current Assessment & Plan      Extensive discussion and review of the pancreatic mass with all the family and with the patient showing them diagrams and illustrations and scans of the tumor.  The biggest problem at this point is the gastric stricture of the outlet with prevents her from eating solid food.  At this point she will be on a pureed liquid diet I talked to Dr. Meyer this morning he is going to be seeing on Tuesday to offer a bypass procedure.  Repeat blood work and a CA 19 9 will be done today.            Health Maintenance Topics with due status: Not Due       Topic Last Completion Date    DEXA Scan 01/20/2023    Lipid Panel 08/28/2023       Future Appointments   Date Time Provider Department Center   10/10/2023 10:00 AM King Duvall MD [place] [id number]   2/28/2024 10:00 AM INJECTION CHAIR [facility]            Signature:  King Duvall MD  [facility]  [address] [place] [address]    Date of encounter: 9/22/23

## 2023-09-27 NOTE — TELEPHONE ENCOUNTER
I do not have anything specific for to start taking.  If the discomfort or pain increases I will take her to ER so she get a scan

## 2023-10-02 NOTE — ASSESSMENT & PLAN NOTE
Discussion about her disease and her options him agreement with her choice not to go in the hospital and have a palliative procedure that will not extend her life   Talking with the family we are going to call hospice of [facility] in to begin the process a meeting her and when the time is ready for pain control but she will need.

## 2023-10-02 NOTE — PROGRESS NOTES
King Meza MD        PATIENT NAME: Niecy Joshua  : 1935  DATE: 10/2/23  MRN: [id number]      Billing Provider: Knig Meza MD  Level of Service: IN OFFICE/OUTPT VISIT, EST, LEVL III, 20-29 MIN  Patient PCP Information       Provider PCP Type    King Meza MD General            Reason for Visit / Chief Complaint: discuss surgery consult       Update PCP  Update Chief Complaint         History of Present Illness / Problem Focused Workflow     Niecy Joshua presents to the clinic with discuss surgery consult     Niecy is here to talk about her pancreatic cancer and her surgical consultation Dr. Linn   After listening to his options her hospitalization and surgery would have a deep impact on what is remaining in her life she feels that the surgery probably would not help and would not like to have it presently she is on a liquid diet and no pain and living her life is usual        Review of Systems   Review of Systems   Constitutional: Negative.    HENT: Negative.     Eyes: Negative.    Respiratory: Negative.     Cardiovascular: Negative.    Gastrointestinal: Negative.    Endocrine: Negative.    Genitourinary: Negative.    Musculoskeletal: Negative.    Integumentary:  Negative.   Neurological: Negative.    Psychiatric/Behavioral: Negative.          Medical / Social / Family History     Past Medical History:   Diagnosis Date    Acid reflux 2023    Cardiac arrhythmia     Low vitamin D level     Nontoxic single thyroid nodule     Osteoporosis     Other pulmonary embolism without acute cor pulmonale        Past Surgical History:   Procedure Laterality Date    BREAST MASS EXCISION      x3  benign    BREAST SURGERY  79&84&87    Benign tumors    EGD, WITH BALLOON DILATION  2023    Procedure: EGD, WITH BALLOON DILATION;  Surgeon: Pardeep Barnes MD;  Location: [facility] ENDOSCOPY;  Service: Gastroenterology;;    EYE SURGERY      Cataracts    HERNIA REPAIR  &     Left/right    INTRAMEDULLARY RODDING OF FEMUR Left 11/01/2022    Procedure: INSERTION, INTRAMEDULLARY AMA, FEMUR;  Surgeon: Florentino Friend DO;  Location: [facility];  Service: Orthopedics;  Laterality: Left;  LEFT    LAPAROSCOPIC REPAIR OF INGUINAL HERNIA Bilateral        Social History  Ms. Joshua  reports that she has never smoked. She has never used smokeless tobacco. She reports that she does not drink alcohol and does not use drugs.    Family History  Ms.'s Joshua  family history includes Cancer in her daughter; Clotting disorder in her mother; Miscarriages / Stillbirths in her daughter; No Known Problems in her father and sister.    Medications and Allergies     Medications  Outpatient Medications Marked as Taking for the 10/2/23 encounter (Office Visit) with King Duvall MD   Medication Sig Dispense Refill    apixaban (ELIQUIS) 5 mg Tab Take 1 tablet (5 mg total) by mouth 2 (two) times daily. 180 tablet 3    calcium carbonate (OS-LEE) 500 mg calcium (1,250 mg) tablet Take 1 tablet by mouth 2 (two) times a day.      denosumab (PROLIA) 60 mg/mL Syrg Inject 60 mg into the skin.      Lactobacillus combination no.8 (ADULT PROBIOTIC ORAL) Take by mouth once daily.      LIPASE-LACTASE-AMYLASE ORAL Take by mouth 3 (three) times daily.      pantoprazole (PROTONIX) 20 MG tablet Take 1 tablet (20 mg total) by mouth once daily. 30 tablet 2       Allergies  Review of patient's allergies indicates:  No Known Allergies    Physical Examination     Vitals:    10/02/23 1319   BP: 118/62   Pulse: 70     Physical Exam  Constitutional:       Appearance: Normal appearance.   HENT:      Head: Normocephalic and atraumatic.      Right Ear: Tympanic membrane normal.      Left Ear: Tympanic membrane normal.      Nose: Nose normal.      Mouth/Throat:      Mouth: Mucous membranes are moist.   Eyes:      Extraocular Movements: Extraocular movements intact.      Pupils: Pupils are equal, round, and reactive to light.    Cardiovascular:      Rate and Rhythm: Normal rate and regular rhythm.      Pulses: Normal pulses.   Pulmonary:      Effort: Pulmonary effort is normal.      Breath sounds: Normal breath sounds.   Abdominal:      General: Abdomen is flat. Bowel sounds are normal.      Palpations: Abdomen is soft.   Musculoskeletal:         General: Normal range of motion.      Cervical back: Normal range of motion and neck supple.   Skin:     General: Skin is warm and dry.   Neurological:      General: No focal deficit present.      Mental Status: She is alert and oriented to person, place, and time.   Psychiatric:         Mood and Affect: Mood normal.         Behavior: Behavior normal.          Assessment and Plan (including Health Maintenance)      Problem List  Smart Sets  Document Outside HM   :    Plan:  No diagnosis found.    Problem List Items Addressed This Visit    None             Health Maintenance Due   Topic Date Due    Pneumococcal Vaccines (Age 65+) (1 - PCV) Never done    TETANUS VACCINE  Never done    Shingles Vaccine (1 of 2) Never done    COVID-19 Vaccine (3 - Moderna series) 10/15/2021       Problem List Items Addressed This Visit    None      Health Maintenance Topics with due status: Not Due       Topic Last Completion Date    DEXA Scan 01/20/2023    Lipid Panel 08/28/2023       Future Appointments   Date Time Provider Department Center   10/10/2023 10:00 AM King Duvall MD [place] [id number]   2/28/2024 10:00 AM INJECTION CHAIR [facility] CHEMOTHERAPY INFUSION [facility] CHEMO [facility]            Signature:  King Duvall MD  [facility]  [address] [address]    Date of encounter: 10/2/23

## 2023-12-19 PROBLEM — Z51.5 COMFORT MEASURES ONLY STATUS: Status: ACTIVE | Noted: 2023-01-01

## 2023-12-19 NOTE — ED PROVIDER NOTES
Encounter Date: 12/19/2023    SCRIBE #1 NOTE: I, Matheus Alvarez, am scribing for, and in the presence of,  Jorgito Field MD. I have scribed the following portions of the note - Other sections scribed: HPI, ROS, PE.       History     Chief Complaint   Patient presents with    Fatigue     Hospice patient hx of pancreatic cancer presents with weakness for the last couple of days. Complaints of RUQ pain. No CP/SOB. AAOX4.     89 y/o female with a history of pancreatic cancer presents to the ED with generalized weakness and abdominal pain for the past 3 days. Denies chest pain, SOB, fever, and chills.  Per pt's granddaughter, pt is on hospice care but is able to do her ADLs. She caught a cold on Friday morning and was instructed to take Tylenol and Advil every 4 hours. However pt has become dehydrated and is now unable to stand up and walk. The abdominal pain is also new. She is full code.    The history is provided by the patient and a relative. No  was used.     Review of patient's allergies indicates:  No Known Allergies  Past Medical History:   Diagnosis Date    Acid reflux 09/07/2023    Cancer     Cardiac arrhythmia     Hypertension     Low vitamin D level     Nontoxic single thyroid nodule     Osteoporosis     Other pulmonary embolism without acute cor pulmonale      Past Surgical History:   Procedure Laterality Date    BREAST MASS EXCISION      x3  benign    BREAST SURGERY  79&84&87    Benign tumors    EGD, WITH BALLOON DILATION  9/21/2023    Procedure: EGD, WITH BALLOON DILATION;  Surgeon: Pardeep Barnes MD;  Location: [facility] ENDOSCOPY;  Service: Gastroenterology;;    EYE SURGERY  2001    Cataracts    HERNIA REPAIR  1964&1979    Left/right    INTRAMEDULLARY RODDING OF FEMUR Left 11/01/2022    Procedure: INSERTION, INTRAMEDULLARY AMA, FEMUR;  Surgeon: Florentino Friend DO;  Location: [facility] OR;  Service: Orthopedics;  Laterality: Left;  LEFT    LAPAROSCOPIC REPAIR OF INGUINAL HERNIA Bilateral       Family History   Problem Relation Age of Onset    Clotting disorder Mother     No Known Problems Father     No Known Problems Sister     Cancer Daughter         Breast    Miscarriages / Stillbirths Daughter      Social History     Tobacco Use    Smoking status: Never    Smokeless tobacco: Never   Substance Use Topics    Alcohol use: Never    Drug use: Never     Review of Systems   Constitutional:  Negative for chills and fever.        Generalized weakness   Respiratory:  Negative for shortness of breath.    Cardiovascular:  Negative for chest pain.   Gastrointestinal:  Positive for abdominal pain.       Physical Exam     Initial Vitals [12/19/23 1107]   BP Pulse Resp Temp SpO2   (!) 96/48 (!) 120 20 97.2 °F (36.2 °C) 95 %      MAP       --         Physical Exam    Nursing note and vitals reviewed.  Constitutional: She appears well-developed and well-nourished. She is not diaphoretic. No distress.   HENT:   Head: Normocephalic and atraumatic.   Right Ear: External ear normal.   Left Ear: External ear normal.   Nose: Nose normal.   Eyes: Conjunctivae and EOM are normal. Pupils are equal, round, and reactive to light. Right eye exhibits no discharge. Left eye exhibits no discharge.   Cardiovascular:  Regular rhythm, normal heart sounds and intact distal pulses.   Tachycardia present.   Exam reveals no gallop and no friction rub.       No murmur heard.  Pulmonary/Chest: Breath sounds normal. No respiratory distress. She has no wheezes. She has no rhonchi. She has no rales. She exhibits no tenderness.   Abdominal: Abdomen is soft. Bowel sounds are normal. She exhibits no distension and no mass. There is abdominal tenderness in the right upper quadrant. There is no rebound and no guarding.   Musculoskeletal:         General: No edema. Normal range of motion.     Neurological: She is alert and oriented to person, place, and time. No cranial nerve deficit or sensory deficit.   Skin: Skin is warm and dry. Capillary  refill takes less than 2 seconds. No erythema. No pallor.   Profoundly jaundiced         ED Course   Critical Care    Date/Time: 12/19/2023 7:07 PM    Performed by: Jorgito Field MD  Authorized by: Jorgito Field MD  Direct patient critical care time: 16 minutes  Additional history critical care time: 9 minutes  Ordering / reviewing critical care time: 9 minutes  Documentation critical care time: 10 minutes  Consulting other physicians critical care time: 6 minutes  Consult with family critical care time: 11 minutes  Total critical care time (exclusive of procedural time) : 61 minutes  Critical care time was exclusive of separately billable procedures and treating other patients.  Critical care was necessary to treat or prevent imminent or life-threatening deterioration of the following conditions: sepsis.  Critical care was time spent personally by me on the following activities: development of treatment plan with patient or surrogate, discussions with primary provider, interpretation of cardiac output measurements, evaluation of patient's response to treatment, examination of patient, obtaining history from patient or surrogate, ordering and performing treatments and interventions, ordering and review of laboratory studies, ordering and review of radiographic studies, pulse oximetry, re-evaluation of patient's condition and review of old charts.  Comments: Patient septic shock requiring Levophed, admission to ICU        Labs Reviewed   COMPREHENSIVE METABOLIC PANEL - Abnormal; Notable for the following components:       Result Value    Sodium Level 128 (*)     Chloride 93 (*)     Carbon Dioxide 18 (*)     Blood Urea Nitrogen 120.6 (*)     Creatinine 3.34 (*)     Protein Total 5.2 (*)     Albumin Level 1.7 (*)     Albumin/Globulin Ratio 0.5 (*)     Bilirubin Total 35.6 (*)     Alkaline Phosphatase 339 (*)     Alanine Aminotransferase 95 (*)     Aspartate Aminotransferase 101 (*)     All other components  within normal limits   URINALYSIS, REFLEX TO URINE CULTURE - Abnormal; Notable for the following components:    Appearance, UA Turbid (*)     Protein, UA 1+ (*)     Blood, UA Trace (*)     Bilirubin, UA 4+ (*)     Leukocyte Esterase,  (*)     WBC, UA 11-20 (*)     WBC Clumps, UA Occasional (*)     Bacteria, UA Many (*)     Mucous, UA Trace (*)     Hyaline Casts, UA 0-2 (*)     Granular Casts 3-5 (*)     Calcium Oxalate Crystals, UA Trace (*)     Amorphous Crystal, UA Few (*)     All other components within normal limits   B-TYPE NATRIURETIC PEPTIDE - Abnormal; Notable for the following components:    Natriuretic Peptide 240.5 (*)     All other components within normal limits   CBC WITH DIFFERENTIAL - Abnormal; Notable for the following components:    WBC 18.22 (*)     RBC 3.69 (*)     Hgb 11.9 (*)     Hct 33.4 (*)     MCH 32.2 (*)     RDW 17.2 (*)     MPV 12.8 (*)     Neut # 16.46 (*)     IG# 0.36 (*)     IPF 12.3 (*)     All other components within normal limits   LACTIC ACID, PLASMA - Abnormal; Notable for the following components:    Lactic Acid Level 10.1 (*)     All other components within normal limits   LACTIC ACID, PLASMA - Normal   TROPONIN I - Normal   MAGNESIUM - Normal   COVID/FLU A&B PCR - Normal    Narrative:     The Xpert Xpress SARS-CoV-2/FLU/RSV plus is a rapid, multiplexed real-time PCR test intended for the simultaneous qualitative detection and differentiation of SARS-CoV-2, Influenza A, Influenza B, and respiratory syncytial virus (RSV) viral RNA in either nasopharyngeal swab or nasal swab specimens.         BLOOD CULTURE [facility]   BLOOD CULTURE [facility]   CULTURE, URINE   CBC W/ AUTO DIFFERENTIAL    Narrative:     The following orders were created for panel order CBC auto differential.  Procedure                               Abnormality         Status                     ---------                               -----------         ------                     CBC with Differential[[id number]]        Abnormal            Final result                 Please view results for these tests on the individual orders.   AMMONIA   LACTIC ACID, PLASMA          Imaging Results              CT Abdomen Pelvis  Without Contrast (Final result)  Result time 12/19/23 16:17:51      Final result by Ofelia Hendrix MD (12/19/23 16:17:51)                   Impression:      Interval development of consolidation in the right lower lobe and to lesser extent left lower lobe with associated pleural effusions.  Findings may represent aspiration pneumonia on the right.  There is significant distension of the stomach and a fluid-filled esophagus.  This places the patient at risk for aspiration.    Intra and extrahepatic biliary dilatation seen and pancreatic duct dilatation is seen secondary to the patient's biliary obstruction from the pancreatic head mass.  Pancreatic head mass appears slightly more prominent than the prior examination.  It may be causing mass effect on the duodenum leading to the gastric outlet obstruction.    Inflammatory changes seen in the mesentery with some punctate lymphadenopathy seen.  Findings could represent peritonitis or  some mesenteric adenitis.    Some masslike edema seen      Electronically signed by: Jacobo Hendrix  Date:    12/19/2023  Time:    16:17               Narrative:    EXAMINATION:  CT ABDOMEN PELVIS WITHOUT CONTRAST    CLINICAL HISTORY:  Abdominal abscess/infection suspected;History of pancreatic cancer, new onset right upper quadrant pain.;    TECHNIQUE:  Low dose axial images, sagittal and coronal reformations were obtained from the lung bases to the pubic symphysis.  No contrast was administered.  Automatic exposure control is utilized to reduce patient radiation exposure.    COMPARISON:  08/31/2023    FINDINGS:  There is areas of consolidation developing in the right lobe and to a lesser extent left lower lobe.  There are bilateral pleural effusions..    The liver is enlarged in  size.  There is severe intrahepatic and extrahepatic biliary dilatation is seen.  The gallbladder is markedly distended.  There are some calcifications in the region of the pancreas.  The pancreatic duct is dilated.  There is a mass seen in the head of the pancreas which is poorly delineated on this noncontrast CT but measures roughly 4 cm x 4.4 cm.  The distal pancreas is atrophic.    The stomach is distended and fluid-filled.  There is a significant amount of gastroesophageal reflux seen.  Patient there is at risk for aspiration.    Adrenal glands appear normal.    Kidneys appear normal in size.  There is an extrarenal pelvis on the right side.  No hydronephrosis or hydroureter seen.  There is some inflammatory change and multiple lymph nodes in the mesentery.  Findings could represent mesenteric adenitis.  Findings could also represent developing peritonitis.  There is evidence of mild anasarca edema.    No colitis is seen.  No diverticulitis is seen.  There is a cyst in the right adnexa.  Uterus appears unremarkable.    Postsurgical changes are seen in the left hip.  The bones are diffusely osteoporotic.                                       X-Ray Chest AP Portable (Final result)  Result time 12/19/23 12:34:44      Final result by Eliezer Granda MD (12/19/23 12:34:44)                   Impression:      Poor inspiratory effort.    Increase in interstitial and pulmonary vascular markings some of which might be related to accentuation by the poor inspiratory effort however, a mild degree of congestion cannot be excluded.    Slightly more confluent opacities in the right infrahilar region and right cardiophrenic angle region infiltrate can not be excluded      Electronically signed by: Eliezer Granda  Date:    12/19/2023  Time:    12:34               Narrative:    EXAMINATION:  XR CHEST AP PORTABLE    CLINICAL HISTORY:  Sepsis;    TECHNIQUE:  Single frontal view of the chest was  performed.    COMPARISON:  October 3, 2022    FINDINGS:  Examination reveals a poor inspiratory effort there is some prominence of the superior mediastinum with soft tissues in the right paratracheal region which might be related to projection and tortuous brachycephalic vessels cardiac silhouette is not enlarged.    There is some increase in interstitial and pulmonary vascular markings some of which might be related to poor inspiratory effort, however, a degree of pulmonary vascular congestion cannot be completely excluded.    Slightly more confluent opacities identified in the right infrahilar region superimposed infiltrate cannot be excluded.    No other focal consolidative changes                                       Medications   NORepinephrine 8 mg in dextrose 5% 250 mL infusion (0.15 mcg/kg/min × 73.5 kg Intravenous Rate/Dose Change 12/19/23 1641)   vancomycin - pharmacy to dose (has no administration in time range)   piperacillin-tazobactam (ZOSYN) 4.5 g in dextrose 5 % in water (D5W) 100 mL IVPB (MB+) (has no administration in time range)   midodrine tablet 5 mg (5 mg Oral Given 12/19/23 1749)   sodium chloride 0.9% bolus 3,000 mL 3,000 mL (1,000 mLs Intravenous New Bag 12/19/23 1812)   heparin (porcine) injection 5,000 Units (has no administration in time range)   albumin human 5% bottle 50 g (has no administration in time range)   lactated ringers bolus 1,000 mL (0 mLs Intravenous Stopped 12/19/23 1206)   lactated ringers bolus 1,848 mL (0 mLs Intravenous Stopped 12/19/23 1310)   piperacillin-tazobactam (ZOSYN) 4.5 g in dextrose 5 % in water (D5W) 100 mL IVPB (MB+) (0 g Intravenous Stopped 12/19/23 1402)   lactated ringers infusion ( Intravenous Verify Only 12/19/23 1641)   vancomycin 1.5 g in dextrose 5 % 250 mL IVPB (ready to mix) ( Intravenous Canceled Entry 12/19/23 1539)     Medical Decision Making      The differential diagnosis includes, but is not limited to:  Pneumonia, dehydration, UTI,  intra-abdominal infection, COVID, flu, worsening malignancy.      Patient is awake alert ill-appearing.  Profoundly jaundiced.  Right upper quadrant tenderness palpation.  Family states decreased p.o. intake recently.  Patient's labs show profound dehydration BUN greater than 120, creatinine near the elevated to 3+.  New leukocytosis with left shift.  Patient was given 3 L of fluid but still remains hypotensive with maps less than 65.  She was started on Levophed.  Chest x-ray concerning for pneumonia on the right.  Started on broad-spectrum antibiotics.  Also has urinary tract infection.  CT of abdomen and pelvis done without contrast shows worsening malignancy, right lower lobe pneumonia, concern for possible aspiration as well.  Discussed with ICU.  Willing to admit.  Family does understand that patient's prognosis is likely poor.  We will admit for further evaluation and management.    Amount and/or Complexity of Data Reviewed  Independent Historian: caregiver     Details: Per pt's granddaughter, pt is on hospice care but is able to do her ADLs. She caught a cold on Friday morning and was instructed to take Tylenol and Advil every 4 hours. However pt has become dehydrated and is now unable to stand up and walk. The abdominal pain is also new. She is full code.  External Data Reviewed: notes.     Details: See ED course  Labs: ordered. Decision-making details documented in ED Course.  Radiology: ordered and independent interpretation performed. Decision-making details documented in ED Course.  ECG/medicine tests: ordered and independent interpretation performed. Decision-making details documented in ED Course.    Risk  Prescription drug management.  Decision regarding hospitalization.            Scribe Attestation:   Scribe #1: I performed the above scribed service and the documentation accurately describes the services I performed. I attest to the accuracy of the note.    Attending Attestation:  "          Physician Attestation for Scribe:  Physician Attestation Statement for Scribe #1: I, Jorgito Field MD, reviewed documentation, as scribed by Matheus Alvarez in my presence, and it is both accurate and complete.             ED Course as of 12/19/23 1915   Tue Dec 19, 2023   1136 Chart review reveals history of locally advanced pancreatic adenocarcinoma in the setting of pancreatic insufficiency with biliary obstruction [MM]   1155 EKG from 1149 shows AFib RVR rate of 112.  QTC prolonged at 535.  Right bundle-branch block morphology. [MM]   1257 CBC auto differential(!)  Significant leukocytosis with left shift.  Stable anemia. [MM]   1257 Troponin I: 0.032 [MM]   1257 Magnesium : 2.30 [MM]   1258 X-Ray Chest AP Portable  Worsening right-sided changes, congestive changes versus possible pneumonia. [MM]   1309 Comprehensive metabolic panel(!)  Hyponatremia.  Significantly elevated BUN creatinine when compared to prior.  Significantly elevated bilirubin as well [MM]   1329 Influenza A, Molecular: Not Detected [MM]   1330 Influenza B, Molecular: Not Detected [MM]   1330 SARS-CoV2 (COVID-19) Qualitative PCR: Not Detected [MM]   1619 Urinalysis, Reflex to Urine Culture(!)  Concerning for urinary tract infection [MM]   1619 Lactate, Doe(!!): 10.1 [MM]      ED Course User Index  [MM] Jorgito Field MD                 Medical Decision Making:   Clinical Tests:   Sepsis Perfusion Assessment: "I attest a sepsis perfusion exam was performed within 6 hours of sepsis, severe sepsis, or septic shock presentation, following fluid resuscitation."             Clinical Impression:  Final diagnoses:  [R53.1] Weakness  [R53.1] Generalized weakness (Primary)  [R17] Jaundice  [K86.89] Pancreatic mass  [J18.9] Pneumonia of right lower lobe due to infectious organism  [N30.00] Acute cystitis without hematuria          ED Disposition Condition    Admit Stable                Jorgito Field MD  12/19/23 1915    "

## 2023-12-19 NOTE — PROGRESS NOTES
"Pharmacokinetic Initial Assessment: IV Vancomycin    Assessment/Plan:    Initiate intravenous vancomycin with loading dose of 1500 mg once with subsequent doses when random concentrations are less than 20 mcg/mL  Desired empiric serum trough concentration is 15 to 20 mcg/mL  Draw vancomycin random level on 12/20 at 0430.  Pharmacy will continue to follow and monitor vancomycin.      Please contact pharmacy at extension [phone] with any questions regarding this assessment.     Thank you for the consult,   Mandie Zee       Patient brief summary:  Niecy Joshua is a 88 y.o. female initiated on antimicrobial therapy with IV Vancomycin for treatment of suspected sepsis    Drug Allergies:   Review of patient's allergies indicates:  No Known Allergies    Actual Body Weight:   73.5 kg    Renal Function:   Estimated Creatinine Clearance: 11.5 mL/min (A) (based on SCr of 3.34 mg/dL (H)).,     Dialysis Method (if applicable):  N/A    CBC (last 72 hours):  Recent Labs   Lab Result Units 12/19/23  1200   WBC x10(3)/mcL 18.22*   Hgb g/dL 11.9*   Hct % 33.4*   Platelet x10(3)/mcL 165   Mono % % 3.8   Eos % % 0.1   Basophil % % 0.4       Metabolic Panel (last 72 hours):  Recent Labs   Lab Result Units 12/19/23  1200   Sodium Level mmol/L 128*   Potassium Level mmol/L 3.7   Chloride mmol/L 93*   Carbon Dioxide mmol/L 18*   Glucose Level mg/dL 91   Blood Urea Nitrogen mg/dL 120.6*   Creatinine mg/dL 3.34*   Albumin Level g/dL 1.7*   Bilirubin Total mg/dL 35.6*   Alkaline Phosphatase unit/L 339*   Aspartate Aminotransferase unit/L 101*   Alanine Aminotransferase unit/L 95*   Magnesium Level mg/dL 2.30       Drug levels (last 3 results):  No results for input(s): "VANCOMYCINRA", "VANCORANDOM", "VANCOMYCINPE", "VANCOPEAK", "VANCOMYCINTR", "VANCOTROUGH" in the last 72 hours.    Microbiologic Results:  Microbiology Results (last 7 days)       Procedure Component Value Units Date/Time    Blood culture x two cultures. Draw prior to " antibiotics. [[id number]] Collected: 12/19/23 1206    Order Status: Sent Specimen: Blood Updated: 12/19/23 1228    Blood culture x two cultures. Draw prior to antibiotics. [[id number]] Collected: 12/19/23 1206    Order Status: Resulted Specimen: Blood Updated: 12/19/23 1228

## 2023-12-19 NOTE — PROGRESS NOTES
Pharmacist Renal Dose Adjustment Note    Niecy Joshua is a 88 y.o. female being treated with the medication zosyn    Patient Data:    Vital Signs (Most Recent):  Temp: 97.2 °F (36.2 °C) (12/19/23 1107)  Pulse: 87 (12/19/23 1442)  Resp: 19 (12/19/23 1442)  BP: (!) 101/54 (12/19/23 1442)  SpO2: (!) 92 % (12/19/23 1442) Vital Signs (72h Range):  Temp:  [97.2 °F (36.2 °C)]   Pulse:  []   Resp:  [17-28]   BP: ()/(43-60)   SpO2:  [85 %-95 %]      Recent Labs   Lab 12/19/23  1200   CREATININE 3.34*     Serum creatinine: 3.34 mg/dL (H) 12/19/23 1200  Estimated creatinine clearance: 11.5 mL/min (A)    Medication:zosyn dose: 4.5g frequency q8h will be changed to medication:zosyn dose:4.5g frequency:q12h    Pharmacist's Name: Desirae Wright  Pharmacist's Extension: [phone]

## 2023-12-19 NOTE — Clinical Note
Diagnosis: Generalized weakness [029476]   Future Attending Provider: THOMAS ORELLANA [[id number]]   Admitting Provider:: THOMAS ORELLANA [[id number]]   Admit to which facility:: [facility] [[id number]]   Reason for IP Medical Treatment  (Clinical interventions that can only be accomplished in the IP setting? ) :: Antibiotics, palliative care consult   I certify that Inpatient services for greater than or equal to 2 midnights are medically necessary:: Yes   Plans for Post-Acute care--if anticipated (pick the single best option):: [facility]

## 2023-12-19 NOTE — H&P
[facility] - Emergency Dept  Pulmonary Critical Care Note    Patient Name: Niecy Joshua  MRN: [id number]  Admission Date: 12/19/2023  Hospital Length of Stay: 0 days  Code Status: DNR  Attending Provider: Jorgito Field MD  Primary Care Provider: King Duvall MD     Subjective:     HPI:   Niecy Joshua is a 88 y.o. female with PMH of pancreatic cancer on hospice, who presented to the ED on 12/19/2023 with CC of fatigue, weakness, and right upper quadrant pain for the past 3 days.  Per patient's family she is on hospice care but is able to do her ADLs normally.  She denies any chest pain shortness on breath fever or chills.  On presentation to the ED, she was hypotensive to systolics of 74 and maps less than 65.  She was given 3 L of fluids without significant improvement of blood pressure.  Levophed was started and is currently at 0.15.  Labs were revealing of a white count of 18, Na of 128, BUN/creatinine of 120.6/3.34, , T bili 35.6, AST /95, BNP of 240.5, and a lactate of 10.1.  Blood cultures are pending and urine culture grew E coli that is pansensitive.  Patient was given vancomycin in the emergency room and also started on Zosyn.  CT chest abdomen and pelvis revealed right lower lobe consolidation and some lesser left lower lobe pleural effusions.  Findings were representative aspiration pneumonia on the right side.  Pre-existing pancreatic mass appears to have enlarged causing mass effect the duodenal leading to gastric outlet obstruction.  Also seen is biliary dilatation and pancreatic duct dilatation all secondary to the enlarging pancreatic head mass.  Patient has no other complaints but is diffusely jaundice.  Patient's daughter is at the bedside and end of life goals were discussed.  Patient does not wish to be intubated and declines wanting CPR.  She was made DNR.  Family wishes to continue supportive care at least until the rest of the family can come into  town and decide about any decisions moving forward about her care.    Hospital Course/Significant events:  12/19/2023 - Admitted to ICU     24 Hour Interval History:      Past Medical History:   Diagnosis Date    Acid reflux 9/7/2023    Cardiac arrhythmia     Low vitamin D level     Nontoxic single thyroid nodule     Osteoporosis     Other pulmonary embolism without acute cor pulmonale        Past Surgical History:   Procedure Laterality Date    BREAST MASS EXCISION      x3  benign    BREAST SURGERY  79&84&87    Benign tumors    EGD, WITH BALLOON DILATION  9/21/2023    Procedure: EGD, WITH BALLOON DILATION;  Surgeon: Pardeep Barnes MD;  Location: [facility] ENDOSCOPY;  Service: Gastroenterology;;    EYE SURGERY  2001    Cataracts    HERNIA REPAIR  1964&1979    Left/right    INTRAMEDULLARY RODDING OF FEMUR Left 11/01/2022    Procedure: INSERTION, INTRAMEDULLARY AMA, FEMUR;  Surgeon: Florentino Friend DO;  Location: [facility] OR;  Service: Orthopedics;  Laterality: Left;  LEFT    LAPAROSCOPIC REPAIR OF INGUINAL HERNIA Bilateral        Social History     Socioeconomic History    Marital status:    Tobacco Use    Smoking status: Never    Smokeless tobacco: Never   Substance and Sexual Activity    Alcohol use: Never    Drug use: Never    Sexual activity: Not Currently     Partners: Male     Birth control/protection: None     Social Determinants of Health     Financial Resource Strain: Low Risk  (8/29/2023)    Overall Financial Resource Strain (CARDIA)     Difficulty of Paying Living Expenses: Not hard at all   Food Insecurity: No Food Insecurity (8/29/2023)    Hunger Vital Sign     Worried About Running Out of Food in the Last Year: Never true     Ran Out of Food in the Last Year: Never true   Transportation Needs: No Transportation Needs (8/29/2023)    PRAPARE - Transportation     Lack of Transportation (Medical): No     Lack of Transportation (Non-Medical): No   Physical Activity: Sufficiently Active (8/29/2023)     Exercise Vital Sign     Days of Exercise per Week: 7 days     Minutes of Exercise per Session: 60 min   Stress: No Stress Concern Present (8/29/2023)    [employer] - Occupational Stress Questionnaire     Feeling of Stress : Not at all   Social Connections: Moderately Integrated (8/29/2023)    Social Connection and Isolation Panel [NHANES]     Frequency of Communication with Friends and Family: More than three times a week     Frequency of Social Gatherings with Friends and Family: More than three times a week     Attends [employer] Services: More than 4 times per year     Active Member of Clubs or Organizations: Yes     Attends Club or Organization Meetings: More than 4 times per year     Marital Status:    Housing Stability: Unknown (8/29/2023)    Housing Stability Vital Sign     Unable to Pay for Housing in the Last Year: No     Unstable Housing in the Last Year: No       Current Outpatient Medications   Medication Instructions    apixaban (ELIQUIS) 5 mg, Oral, 2 times daily    calcium carbonate (OS-LEE) 500 mg calcium (1,250 mg) tablet 1 tablet, Oral, 2 times daily    denosumab (PROLIA) 60 mg, Subcutaneous    Lactobacillus combination no.8 (ADULT PROBIOTIC ORAL) Oral, Daily    LIPASE-LACTASE-AMYLASE ORAL Oral, 3 times daily    pantoprazole (PROTONIX) 20 mg, Oral     Current Inpatient Medications   midodrine  5 mg Oral BID WM    [START ON 12/20/2023] piperacillin-tazobactam (Zosyn) IV (PEDS and ADULTS) (extended infusion is not appropriate)  4.5 g Intravenous Q12H     Current Intravenous Infusions   NORepinephrine bitartrate-D5W 0.15 mcg/kg/min (12/19/23 1641)    sodium chloride 0.9%       Review of Systems   All other systems reviewed and are negative.       Objective:     Intake/Output Summary (Last 24 hours) at 12/19/2023 1709  Last data filed at 12/19/2023 1641  Gross per 24 hour   Intake 2937.68 ml   Output --   Net 2937.68 ml     Vital Signs (Most Recent):  Temp: 97.2 °F  (36.2 °C) (12/19/23 1107)  Pulse: 76 (12/19/23 1506)  Resp: (!) 22 (12/19/23 1506)  BP: (!) 106/57 (12/19/23 1506)  SpO2: 95 % (12/19/23 1506)  Body mass index is 25.37 kg/m².  Weight: 73.5 kg (162 lb) Vital Signs (24h Range):  Temp:  [97.2 °F (36.2 °C)] 97.2 °F (36.2 °C)  Pulse:  [] 76  Resp:  [17-28] 22  SpO2:  [85 %-95 %] 95 %  BP: ()/(43-60) 106/57     Physical Exam  Constitutional:       Appearance: She is ill-appearing and toxic-appearing.      Comments: Patient is diffusely jaundice   HENT:      Head: Normocephalic and atraumatic.   Eyes:      General: Scleral icterus present.      Extraocular Movements: Extraocular movements intact.      Pupils: Pupils are equal, round, and reactive to light.   Cardiovascular:      Rate and Rhythm: Normal rate and regular rhythm.      Pulses: Normal pulses.      Heart sounds: Normal heart sounds.   Pulmonary:      Effort: Pulmonary effort is normal.      Breath sounds: Normal breath sounds.   Abdominal:      General: Abdomen is flat.      Palpations: Abdomen is soft.   Skin:     Coloration: Skin is jaundiced.   Neurological:      Mental Status: She is disoriented.      Comments: Patient is hard of hearing but does not appear to be neurologically at her baseline.  She will answer some questions appropriately but per her daughter is speaking nonsensically.         Lines/Drains/Airways       Peripheral Intravenous Line  Duration                  Peripheral IV - Single Lumen 12/19/23 1106 18 G Left Antecubital <1 day         Peripheral IV - Single Lumen 12/19/23 1140 20 G Left Hand <1 day                  Significant Labs:  Lab Results   Component Value Date    WBC 18.22 (H) 12/19/2023    HGB 11.9 (L) 12/19/2023    HCT 33.4 (L) 12/19/2023    MCV 90.5 12/19/2023     12/19/2023       BMP  Lab Results   Component Value Date     (L) 12/19/2023    K 3.7 12/19/2023    CHLORIDE 93 (L) 12/19/2023    CO2 18 (L) 12/19/2023    .6 (H) 12/19/2023    CREATININE  "3.34 (H) 12/19/2023    CALCIUM 9.3 12/19/2023    AGAP 6.0 11/11/2022    ESTGFRAFRICA 88 02/14/2020    EGFRNONAA >60 07/19/2019     ABG  No results for input(s): "PH", "PO2", "PCO2", "HCO3", "BE" in the last 168 hours.  Mechanical Ventilation Support:       Significant Imaging:  I have reviewed the pertinent imaging within the past 24 hours.    Assessment/Plan:     Assessment  Locally advanced pancreatic adenocarcinoma in the setting of pancreatic insufficiency with biliary obstruction  Hypovolemia and hypotension requiring pressor support  Sepsis  Lactic acidosis  Right lower lobe pneumonia  UTI with E coli  Hypo natremia  Significantly elevated BUN and creatinine  Hyperbilirubinemia  Significantly elevated LFTs    Plan  Admitted to ICU/Continue ICU level of care for ongoing monitoring and medical management  Patient was started on Levophed currently on 0.15 for hypotension, we will wean as tolerated  We will start midodrine 5 b.i.d. as well for hypotension  We will give an additional 3 L bolus of normal saline  Patient is started on Zosyn, we will follow up blood cultures  Ammonia level ordered we will follow up results.  Would not be surprised this was significantly elevated and causing the patient's disorientation given abnormal labs  We will order speech evaluation to determine if patient is at risk for aspiration.  If normal patient is okay for diet  Repeat lactate ordered  We will continue further discussions with patient's family about goals of care and further medical management    DVT Prophylaxis: heparin      32 minutes of critical care was time spent personally by me on the following activities: development of treatment plan with patient or surrogate and bedside caregivers, discussions with consultants, evaluation of patient's response to treatment, examination of patient, ordering and performing treatments and interventions, ordering and review of laboratory studies, ordering and review of radiographic " studies, pulse oximetry, re-evaluation of patient's condition.  This critical care time did not overlap with that of any other provider or involve time for any procedures.    Jose Cantrell MD PGY-1  Pulmonary Critical Care Medicine  [facility] - Emergency Dept

## 2023-12-20 NOTE — NURSING
Took over care at 10:15am. Agree with previous nursing assessment. Patietn on comfort care, planning for DC home with hospice

## 2023-12-20 NOTE — PLAN OF CARE
[facility] has accepted patient and will order hospital bed. Patient is in will call with [facility].

## 2023-12-20 NOTE — PLAN OF CARE
12/20/23 1317   Final Note   Assessment Type Final Discharge Note   Anticipated Discharge Disposition HospiceHome   Post-Acute Status   Post-Acute Authorization Hospice   Discharge Delays None known at this time     Patient will dc home today under hospice care. [facility] will take over care. Clincials, dc order, and AVS sent to [facility] via [facility]. [facility] ambulance will provide transportation.

## 2023-12-20 NOTE — DISCHARGE SUMMARY
[facility] - 7th Floor ICU  Critical Care Medicine  Discharge Summary      Patient Name: Niecy Joshua  MRN: [id number]  Admission Date: 12/19/2023  Hospital Length of Stay: 1 days  Discharge Date and Time:  12/20/2023 9:30 AM  Attending Physician: Ventura Hardy MD   Discharging Provider: LLUVIA Royal  Primary Care Provider: King Duvall MD  Reason for Admission: pancreatic cancer    HPI:   HPI:   Niecy Joshua is a 88 y.o. female with PMH of pancreatic cancer on hospice, who presented to the ED on 12/19/2023 with CC of fatigue, weakness, and right upper quadrant pain for the past 3 days.  Per patient's family she is on hospice care but is able to do her ADLs normally.  She denies any chest pain shortness on breath fever or chills.  On presentation to the ED, she was hypotensive to systolics of 74 and maps less than 65.  She was given 3 L of fluids without significant improvement of blood pressure.  Levophed was started and is currently at 0.15.  Labs were revealing of a white count of 18, Na of 128, BUN/creatinine of 120.6/3.34, , T bili 35.6, AST /95, BNP of 240.5, and a lactate of 10.1.  Blood cultures are pending and urine culture grew E coli that is pansensitive.  Patient was given vancomycin in the emergency room and also started on Zosyn.  CT chest abdomen and pelvis revealed right lower lobe consolidation and some lesser left lower lobe pleural effusions.  Findings were representative aspiration pneumonia on the right side.  Pre-existing pancreatic mass appears to have enlarged causing mass effect the duodenal leading to gastric outlet obstruction.  Also seen is biliary dilatation and pancreatic duct dilatation all secondary to the enlarging pancreatic head mass.  Patient has no other complaints but is diffusely jaundice.  Patient's daughter is at the bedside and end of life goals were discussed.  Patient does not wish to be intubated and declines wanting CPR.   She was made DNR.  Family wishes to continue supportive care at least until the rest of the family can come into town and decide about any decisions moving forward about her care.     Hospital Course/Significant events:  12/19/2023 - Admitted to ICU    Family has decided to go home with hospice    * No surgery found *    Indwelling Lines/Drains at Time of Discharge:   Lines/Drains/Airways       None                 Hospital Course:   No notes on file    Consults (From admission, onward)          Status Ordering Provider     Inpatient consult to Social Work/Case Management  Once        Provider:  (Not yet assigned)    Acknowledged THOMAS ORELLANA          Significant Labs:  All pertinent labs within the past 24 hours have been reviewed.    Significant Imaging:  I have reviewed all pertinent imaging results/findings within the past 24 hours.    Pending Diagnostic Studies:       None          Final Active Diagnoses:    Diagnosis Date Noted POA    PRINCIPAL PROBLEM:  Pancreatic adenocarcinoma [C25.9] 09/22/2023 Yes    Comfort measures only status [Z51.5] 12/19/2023 Not Applicable      Problems Resolved During this Admission:     Assessment  Locally advanced pancreatic adenocarcinoma in the setting of pancreatic insufficiency with biliary obstruction  Hypovolemia and hypotension requiring pressor support  Sepsis  Lactic acidosis  Right lower lobe pneumonia  UTI with E coli  Hypo natremia  Significantly elevated BUN and creatinine  Hyperbilirubinemia  Significantly elevated LFTs    Discharged Condition: stable    Disposition:   Home with hospice    Patient Instructions:   No discharge procedures on file.  Medications:  Reconciled Home Medications:      Medication List        CONTINUE taking these medications      ADULT PROBIOTIC ORAL  Take by mouth once daily.     apixaban 5 mg Tab  Commonly known as: ELIQUIS  Take 1 tablet (5 mg total) by mouth 2 (two) times daily.     calcium carbonate 500 mg calcium (1,250 mg)  tablet  Commonly known as: OS-LEE  Take 1 tablet by mouth 2 (two) times a day.     denosumab 60 mg/mL Syrg  Commonly known as: PROLIA  Inject 60 mg into the skin.     LIPASE-LACTASE-AMYLASE ORAL  Take by mouth 3 (three) times daily.     pantoprazole 20 MG tablet  Commonly known as: PROTONIX  TAKE 1 TABLET BY MOUTH EVERY DAY               LLUVIA Royal  Critical Care Medicine  [facility] - 7th Floor ICU

## 2023-12-20 NOTE — NURSING
All of pt's drips including vasopressors turned off at this time. Pt's family only wanting comfort care measures at this time. ICU res contacted and updated.  paged to pt's bedside. Multiple family members surrounding pt at this time. No distress noted in patient.

## 2023-12-20 NOTE — PLAN OF CARE
12/20/23 0934   Discharge Assessment   Assessment Type Discharge Planning Assessment   Confirmed/corrected address, phone number and insurance Yes   Confirmed Demographics Correct on Facesheet   Source of Information family   Communicated AARON with patient/caregiver Date not available/Unable to determine   Reason For Admission Pancreatic adenocarcinoma   People in Home child(tomy), adult   Do you expect to return to your current living situation? Yes   Do you have help at home or someone to help you manage your care at home? Yes   Who are your caregiver(s) and their phone number(s)? Daughter Marya [phone]   Prior to hospitilization cognitive status: Unable to Assess   Current cognitive status: Unable to Assess   Walking or Climbing Stairs Difficulty yes   Walking or Climbing Stairs transferring difficulty, dependent   Dressing/Bathing Difficulty yes   Dressing/Bathing dressing difficulty, dependent   Home Accessibility stairs to enter home   Number of Stairs, Main Entrance four   Home Layout Able to live on 1st floor   Equipment Currently Used at Home walker, rolling;oxygen;nebulizer  (Suction)   Do you currently have service(s) that help you manage your care at home? No   Do you take prescription medications? Yes   Do you have prescription coverage? Yes   Coverage Medicare   Who is going to help you get home at discharge? [facility]   How do you get to doctors appointments? family or friend will provide   Are you on dialysis? No   Do you take coumadin? No   Discharge Plan A Hospice/home   Discharge Plan B Hospice/home   DME Needed Upon Discharge  none   Discharge Plan discussed with: Adult children   Transition of Care Barriers None   OTHER   Name(s) of People in Home Marya Cisneros     Patient lives with daughter Marya. Patient at time of assessment is sleeping not easily aroused. Family at bedside able to complete assessment. Patient previously had home health services with [facility], was dc in order to be  admitted to hospital. Family would like to resume services. FOC obtained and placed in chart. Referral sent to [facility] via [facility]. Left message for [facility] liaison. Patient is expected to dc home today. Patient has oxygen at home, suction, and nebulizer. Patient does not have a hospital bed at home.

## 2023-12-20 NOTE — PLAN OF CARE
Problem: Adult Inpatient Plan of Care  Goal: Plan of Care Review  Outcome: Ongoing, Progressing  Flowsheets (Taken 12/19/2023 2241)  Plan of Care Reviewed With: patient  Goal: Patient-Specific Goal (Individualized)  Outcome: Ongoing, Progressing  Goal: Absence of Hospital-Acquired Illness or Injury  Outcome: Ongoing, Progressing  Goal: Optimal Comfort and Wellbeing  Outcome: Ongoing, Progressing  Goal: Readiness for Transition of Care  Outcome: Ongoing, Progressing     Problem: Skin Injury Risk Increased  Goal: Skin Health and Integrity  Outcome: Ongoing, Progressing     Problem: Palliative Care  Goal: Enhanced Quality of Life  Outcome: Ongoing, Progressing

## 2023-12-22 NOTE — PHYSICIAN QUERY
PT Name: Niecy Joshua  MR #: [id number]     DOCUMENTATION CLARIFICATION     CDS/: Altagracia Storey RN           Contact information: [email or web address]   This form is a permanent document in the medical record.    Query Date: December 22, 2023    By submitting this query, we are merely seeking further clarification of documentation.  Please utilize your independent clinical judgment when addressing the question(s) below.    The Medical Record contains the following:   Indicator Supporting Clinical Findings Location in Medical Record    Kidney (Renal) Insufficiency        Kidney (Renal) Failure/Injury        Nephrotoxic Agents       x BUN/Creatinine           GFR  12/19/23      .6 (H)   Creatinine 3.34 (H)   eGFR 13      Labs of 12/19    x Urine: Casts         Eosinophils  12/19/23    Color, UA Dark-Yellow   Appearance, UA Turbid !   Specific Gravity,UA 1.017   pH, UA 5.5   Protein, UA 1+ !   Glucose, UA Normal   Ketones, UA Negative   Occult Blood UA Trace !   NITRITE UA Negative   Bilirubin, UA 4+ !   Urobilinogen, UA Normal   Leukocytes,  !   RBC, UA 0-5   WBC, UA 11-20 !   Bacteria, UA Many !   Squamous Epithelial Cells, UA Trace   WBC Clumps, UA Occasional !   Hyaline Casts, UA 0-2 !   AMORPHOUS CRYSTAL, UA ([facility]) UL Few !   Mucous, UA Trace !   Ca Oxalate [place], UA Trace !      U/A of 12/19    x Urine Output 0  ml    I & O flow sheet of 12/19 0612    x Dehydration Patient's labs show profound dehydration BUN greater than 120, creatinine near the elevated to 3+.  Amount and/or Complexity of Data Reviewed :   ED Provider note of 12/19     Nausea/Vomiting        Dialysis/CRRT       x Treatment Lactated ringers bolus 1000 ml IV ED one time     Lactated ringers  bolus 30 ml/kg IV once     Lactated ringers 1000 ml IV ED one time @ 200 ml /hr     Sodium chloride 0.9 % 3000 ml IV continuous @ 1500 ml/hr over 2 hours        Mar 12/19 1206     Mar 12/19 1310     Mar 12/19 1317     Mar 12/19         x Other Significantly elevated BUN and creatinine         08/28/23 09:46 08/29/23 10:12 09/22/23 12:02   BUN 22.3 (H)  41.9 (H)   Creatinine 0.76 0.82 1.19 (H)   eGFR >60 >60 44      H & P of 12/19         Prior encounter labs of 8/8/23, 8/25/23 and 9/22/23        [facility] approved diagnostic criteria for acute kidney injury is based on KDIGO criteria:    An increase in serum creatinine > 0.3mg/dl within 48 hours  OR  Increase in serum creatinine to > 1.5x baseline, which is known or presumed to have occurred within the prior 7 days  OR  Urine volume <0.5 ml/kg/hr for 6 hours       The clinical guidelines noted above are only a system guideline. It does not replace the providers clinical judgment.       Provider, please specify the diagnosis or diagnoses associated with above clinical findings.     [  x  ] Unspecified Acute Kidney Failure/Injury       [    ] Other Acute Kidney Failure/Injury (please specify): ___________________       [    ] Other (please specify): _______________________________     [  ] Clinically Undetermined           Please document in your progress notes daily for the duration of treatment until resolved and include in your discharge summary.    References:   KDIGO Clinical Practice Guideline for Acute Kidney Injury. (2012, March). Retrieved October 21, 2020, from https://kdigo.org/wp-content/uploads/2016/10/PPQZH-1122-JXS-Guideline-English.pdf    IRA Denton MD, NIKHIL Gomez MD, & MONICA Vivar MD. (1960). Renal medullary necrosis [Abstract]. The American Journal of Medicine, 29(1), 132-156. Doi:https://www.sciencedirect.com/science/article/abs/pii/6897923302645529    MONICA Major MD, & LUIS ANGEL Nelson MD, MS. (2020, June 18). Definition and staging of chronic kidney disease in adults ([id number] NIKHIL Shaffer MD, ScD & [id number] LUIS ANTONIO Hamilton MD, MSc, Eds.). Retrieved October 21, 2020, from  https://www.IGIGI.Virgance/contents/definition-and-staging-of-chronic-kidney-disease-in-adults?search=ckd%20staging&source=search_result&selectedTitle=1~150&usage_type=default&display_rank=1     ELODIA Hoffman MD, FACP. (2015, Minal 15). Acute kidney injury revisited. Retrieved October 21, 2020, from https://acphospitalist.org/archives/2015/06/coding-acute-kidney-injury.htm    HARDY Charles MD. (2019, July). Renal Cortical Necrosis. Retrieved October 21, 2020, from https://[email or web address] [place].Virgance/professional/genitourinary-disorders/renovascular-disorders/renal-cortical-necrosis    Form No. [id number]

## (undated) DEVICE — STAPLER SKIN PROXIMATE WIDE

## (undated) DEVICE — GLOVE PROTEXIS HYDROGEL SZ6

## (undated) DEVICE — DRESSING ABSRBNT ISLAND 3.6X8

## (undated) DEVICE — WIRE GUIDE 3.2MM 400MM
Type: IMPLANTABLE DEVICE | Site: LEG | Status: NON-FUNCTIONAL
Removed: 2022-11-01

## (undated) DEVICE — TIP SUCTION YANKAUER

## (undated) DEVICE — SOL IRRI STRL WATER 1000ML

## (undated) DEVICE — BIT DRILL 4.2MM 3 FLUTD 145MM

## (undated) DEVICE — SUT VICRYL BR 1 GEN 27 CT-1

## (undated) DEVICE — APPLICATOR CHLORAPREP ORN 26ML

## (undated) DEVICE — DRAPE ORTH SPLIT 77X108IN

## (undated) DEVICE — KIT SURGICAL COLON .25 1.1OZ

## (undated) DEVICE — DEVICE LOCKING RX - OLYMPUS

## (undated) DEVICE — ELECTRODE REM POLYHESIVE II

## (undated) DEVICE — GLOVE PROTEXIS HYDROGEL SZ9

## (undated) DEVICE — SUPPORT ULNA NERVE PROTECTOR

## (undated) DEVICE — BLADE SURG CARBON STEEL #10

## (undated) DEVICE — TAPE SILK 3IN

## (undated) DEVICE — Device

## (undated) DEVICE — SPHINCTEROTOME AUTOTOME RX 39

## (undated) DEVICE — BLOCK BLOX BITE DENT RIM 54FR

## (undated) DEVICE — GAUZE SPONGE 4X4 12PLY

## (undated) DEVICE — DRESSING TELFA + BARR 4X6IN

## (undated) DEVICE — GLOVE PROTEXIS NEU-THERA SZ6

## (undated) DEVICE — COVER TABLE HVY DTY 60X90IN

## (undated) DEVICE — GOWN SMARTGOWN 3XL XLONG

## (undated) DEVICE — COLLECTION SPECIMEN NEPTUNE

## (undated) DEVICE — CATH BALLOON EXTRACT QUATTRO

## (undated) DEVICE — CATH BLLN CRE 5.5FR 15-18MM

## (undated) DEVICE — GLOVE PROTEXIS BLUE LATEX 9

## (undated) DEVICE — ELECTRODE REM PLYHSV RETURN 9

## (undated) DEVICE — COVER FULLGUARD SHOE HIGH-TOP

## (undated) DEVICE — GUIDEWIRE JAGWIRE .025IN 260CM

## (undated) DEVICE — DRAPE C-ARMOR EQUIPMENT COVER

## (undated) DEVICE — DRESSING XEROFORM 5X9IN

## (undated) DEVICE — CRE DILATION BLLN 12-15MM

## (undated) DEVICE — DRAPE STERI U-SHAPED 47X51IN